# Patient Record
Sex: MALE | Race: WHITE | NOT HISPANIC OR LATINO | Employment: OTHER | URBAN - NONMETROPOLITAN AREA
[De-identification: names, ages, dates, MRNs, and addresses within clinical notes are randomized per-mention and may not be internally consistent; named-entity substitution may affect disease eponyms.]

---

## 2019-10-30 ENCOUNTER — LAB (OUTPATIENT)
Dept: LAB | Facility: HOSPITAL | Age: 79
End: 2019-10-30

## 2019-10-30 ENCOUNTER — HOSPITAL ENCOUNTER (OUTPATIENT)
Dept: GENERAL RADIOLOGY | Facility: HOSPITAL | Age: 79
Discharge: HOME | End: 2019-10-30
Admitting: PEDIATRICS

## 2019-10-30 ENCOUNTER — TRANSCRIBE ORDERS (OUTPATIENT)
Dept: ADMINISTRATIVE | Facility: HOSPITAL | Age: 79
End: 2019-10-30

## 2019-10-30 DIAGNOSIS — Z12.5 ENCOUNTER FOR SPECIAL SCREENING EXAMINATION FOR NEOPLASM OF PROSTATE: ICD-10-CM

## 2019-10-30 DIAGNOSIS — R05.9 COUGH: ICD-10-CM

## 2019-10-30 DIAGNOSIS — E66.9 OBESITY, UNSPECIFIED CLASSIFICATION, UNSPECIFIED OBESITY TYPE, UNSPECIFIED WHETHER SERIOUS COMORBIDITY PRESENT: Primary | ICD-10-CM

## 2019-10-30 DIAGNOSIS — M54.2 CERVICALGIA: ICD-10-CM

## 2019-10-30 DIAGNOSIS — E66.9 OBESITY, UNSPECIFIED CLASSIFICATION, UNSPECIFIED OBESITY TYPE, UNSPECIFIED WHETHER SERIOUS COMORBIDITY PRESENT: ICD-10-CM

## 2019-10-30 LAB
ALBUMIN SERPL-MCNC: 3.7 G/DL (ref 3.5–5)
ALBUMIN/GLOB SERPL: 1 G/DL (ref 1.1–2.5)
ALP SERPL-CCNC: 116 U/L (ref 24–120)
ALT SERPL W P-5'-P-CCNC: 17 U/L (ref 0–54)
ANION GAP SERPL CALCULATED.3IONS-SCNC: 7 MMOL/L (ref 4–13)
AST SERPL-CCNC: 22 U/L (ref 7–45)
AUTO MIXED CELLS #: 1.1 10*3/MM3 (ref 0.1–2.6)
AUTO MIXED CELLS %: 14.8 % (ref 0.1–24)
BILIRUB SERPL-MCNC: 0.6 MG/DL (ref 0.1–1)
BILIRUB UR QL STRIP: NEGATIVE
BUN BLD-MCNC: 15 MG/DL (ref 5–21)
BUN/CREAT SERPL: 12.3
CALCIUM SPEC-SCNC: 8.5 MG/DL (ref 8.4–10.4)
CHLORIDE SERPL-SCNC: 108 MMOL/L (ref 98–110)
CLARITY UR: CLEAR
CO2 SERPL-SCNC: 25 MMOL/L (ref 24–31)
COLOR UR: YELLOW
CREAT BLD-MCNC: 1.22 MG/DL (ref 0.5–1.4)
ERYTHROCYTE [DISTWIDTH] IN BLOOD BY AUTOMATED COUNT: 19 % (ref 12.3–15.4)
GFR SERPL CREATININE-BSD FRML MDRD: 58 ML/MIN/1.73
GLOBULIN UR ELPH-MCNC: 3.8 GM/DL
GLUCOSE BLD-MCNC: 103 MG/DL (ref 70–100)
GLUCOSE UR STRIP-MCNC: NEGATIVE MG/DL
HBA1C MFR BLD: 6.4 % (ref 4.8–5.9)
HCT VFR BLD AUTO: 31.8 % (ref 37.5–51)
HGB BLD-MCNC: 8.8 G/DL (ref 13–17.7)
HGB UR QL STRIP.AUTO: NEGATIVE
KETONES UR QL STRIP: NEGATIVE
LEUKOCYTE ESTERASE UR QL STRIP.AUTO: NEGATIVE
LYMPHOCYTES # BLD AUTO: 1.4 10*3/MM3 (ref 0.7–3.1)
LYMPHOCYTES NFR BLD AUTO: 18.8 % (ref 19.6–45.3)
MCH RBC QN AUTO: 20.6 PG (ref 26.6–33)
MCHC RBC AUTO-ENTMCNC: 27.7 G/DL (ref 31.5–35.7)
MCV RBC AUTO: 74.3 FL (ref 79–97)
NEUTROPHILS # BLD AUTO: 5 10*3/MM3 (ref 1.7–7)
NEUTROPHILS NFR BLD AUTO: 66.4 % (ref 42.7–76)
NITRITE UR QL STRIP: NEGATIVE
PH UR STRIP.AUTO: 6 [PH] (ref 5–8)
PLATELET # BLD AUTO: 395 10*3/MM3 (ref 140–450)
PMV BLD AUTO: 8.8 FL (ref 6–12)
POTASSIUM BLD-SCNC: 4.7 MMOL/L (ref 3.5–5.3)
PROT SERPL-MCNC: 7.5 G/DL (ref 6.3–8.7)
PROT UR QL STRIP: NEGATIVE
RBC # BLD AUTO: 4.28 10*6/MM3 (ref 4.14–5.8)
SODIUM BLD-SCNC: 140 MMOL/L (ref 135–145)
SP GR UR STRIP: 1.01 (ref 1–1.03)
UROBILINOGEN UR QL STRIP: NORMAL
WBC NRBC COR # BLD: 7.5 10*3/MM3 (ref 3.4–10.8)

## 2019-10-30 PROCEDURE — 71046 X-RAY EXAM CHEST 2 VIEWS: CPT

## 2019-10-30 PROCEDURE — 72050 X-RAY EXAM NECK SPINE 4/5VWS: CPT

## 2019-10-30 PROCEDURE — 83036 HEMOGLOBIN GLYCOSYLATED A1C: CPT

## 2019-10-30 PROCEDURE — G0103 PSA SCREENING: HCPCS | Performed by: PEDIATRICS

## 2019-10-30 PROCEDURE — 36415 COLL VENOUS BLD VENIPUNCTURE: CPT

## 2019-10-30 PROCEDURE — 81003 URINALYSIS AUTO W/O SCOPE: CPT

## 2019-10-30 PROCEDURE — 85025 COMPLETE CBC W/AUTO DIFF WBC: CPT

## 2019-10-30 PROCEDURE — 80053 COMPREHEN METABOLIC PANEL: CPT

## 2019-10-31 LAB — PSA SERPL-MCNC: 3.94 NG/ML (ref 0–4)

## 2019-11-05 RX ORDER — MULTIVIT-MIN/IRON/FOLIC ACID/K 18-600-40
CAPSULE ORAL
Status: ON HOLD | COMMUNITY
End: 2019-11-19

## 2019-11-05 RX ORDER — ERGOCALCIFEROL (VITAMIN D2) 10 MCG
400 TABLET ORAL DAILY
Status: ON HOLD | COMMUNITY
End: 2019-11-19

## 2019-11-15 ENCOUNTER — APPOINTMENT (OUTPATIENT)
Dept: GENERAL RADIOLOGY | Facility: HOSPITAL | Age: 79
End: 2019-11-15

## 2019-11-15 ENCOUNTER — HOSPITAL ENCOUNTER (INPATIENT)
Facility: HOSPITAL | Age: 79
LOS: 7 days | Discharge: NURSING, REHAB OR LONG-TERM CARE | End: 2019-11-22
Attending: EMERGENCY MEDICINE | Admitting: INTERNAL MEDICINE

## 2019-11-15 DIAGNOSIS — J96.02 ACUTE RESPIRATORY FAILURE WITH HYPOXIA AND HYPERCAPNIA (HCC): Primary | ICD-10-CM

## 2019-11-15 DIAGNOSIS — J44.1 COPD WITH ACUTE EXACERBATION (HCC): ICD-10-CM

## 2019-11-15 DIAGNOSIS — R26.9 GAIT ABNORMALITY: ICD-10-CM

## 2019-11-15 DIAGNOSIS — J96.01 ACUTE RESPIRATORY FAILURE WITH HYPOXIA AND HYPERCAPNIA (HCC): Primary | ICD-10-CM

## 2019-11-15 PROBLEM — D64.9 ANEMIA: Status: ACTIVE | Noted: 2019-11-15

## 2019-11-15 PROBLEM — I10 HYPERTENSION: Status: ACTIVE | Noted: 2019-11-15

## 2019-11-15 LAB
ALBUMIN SERPL-MCNC: 3.7 G/DL (ref 3.5–5.2)
ALBUMIN/GLOB SERPL: 0.9 G/DL
ALP SERPL-CCNC: 145 U/L (ref 39–117)
ALT SERPL W P-5'-P-CCNC: 17 U/L (ref 1–41)
ANION GAP SERPL CALCULATED.3IONS-SCNC: 8 MMOL/L (ref 5–15)
ANISOCYTOSIS BLD QL: ABNORMAL
ARTERIAL PATENCY WRIST A: ABNORMAL
ARTERIAL PATENCY WRIST A: POSITIVE
AST SERPL-CCNC: 27 U/L (ref 1–40)
ATMOSPHERIC PRESS: 759 MMHG
BASE EXCESS BLDA CALC-SCNC: 4.7 MMOL/L (ref 0–2)
BASE EXCESS BLDA CALC-SCNC: 5.7 MMOL/L (ref 0–2)
BASE EXCESS BLDA CALC-SCNC: 5.9 MMOL/L (ref 0–2)
BASE EXCESS BLDA CALC-SCNC: 6.2 MMOL/L (ref 0–2)
BASOPHILS # BLD MANUAL: 0.13 10*3/MM3 (ref 0–0.2)
BASOPHILS NFR BLD AUTO: 2 % (ref 0–1.5)
BDY SITE: ABNORMAL
BILIRUB SERPL-MCNC: 0.6 MG/DL (ref 0.2–1.2)
BODY TEMPERATURE: 37 C
BUN BLD-MCNC: 14 MG/DL (ref 8–23)
BUN/CREAT SERPL: 13.2 (ref 7–25)
CALCIUM SPEC-SCNC: 9 MG/DL (ref 8.6–10.5)
CHLORIDE SERPL-SCNC: 103 MMOL/L (ref 98–107)
CO2 SERPL-SCNC: 33 MMOL/L (ref 22–29)
CREAT BLD-MCNC: 1.06 MG/DL (ref 0.76–1.27)
D DIMER PPP FEU-MCNC: 1.81 MG/L (FEU) (ref 0–0.5)
D-LACTATE SERPL-SCNC: 1.1 MMOL/L (ref 0.5–2)
DEPRECATED RDW RBC AUTO: 54.5 FL (ref 37–54)
EOSINOPHIL # BLD MANUAL: 0.79 10*3/MM3 (ref 0–0.4)
EOSINOPHIL NFR BLD MANUAL: 12.1 % (ref 0.3–6.2)
EPAP: 6
ERYTHROCYTE [DISTWIDTH] IN BLOOD BY AUTOMATED COUNT: 20.5 % (ref 12.3–15.4)
GAS FLOW AIRWAY: 40 LPM
GFR SERPL CREATININE-BSD FRML MDRD: 68 ML/MIN/1.73
GIANT PLATELETS: ABNORMAL
GLOBULIN UR ELPH-MCNC: 3.9 GM/DL
GLUCOSE BLD-MCNC: 128 MG/DL (ref 65–99)
HCO3 BLDA-SCNC: 32.1 MMOL/L (ref 20–26)
HCO3 BLDA-SCNC: 32.2 MMOL/L (ref 20–26)
HCO3 BLDA-SCNC: 33.2 MMOL/L (ref 20–26)
HCO3 BLDA-SCNC: 33.3 MMOL/L (ref 20–26)
HCT VFR BLD AUTO: 32.4 % (ref 37.5–51)
HGB BLD-MCNC: 8.5 G/DL (ref 13–17.7)
HOLD SPECIMEN: NORMAL
HOROWITZ INDEX BLD+IHG-RTO: 30 %
HOROWITZ INDEX BLD+IHG-RTO: 35 %
HOROWITZ INDEX BLD+IHG-RTO: 60 %
HYPOCHROMIA BLD QL: ABNORMAL
IPAP: 20
LYMPHOCYTES # BLD MANUAL: 0.53 10*3/MM3 (ref 0.7–3.1)
LYMPHOCYTES NFR BLD MANUAL: 4 % (ref 5–12)
LYMPHOCYTES NFR BLD MANUAL: 8.1 % (ref 19.6–45.3)
Lab: ABNORMAL
MAGNESIUM SERPL-MCNC: 2 MG/DL (ref 1.6–2.4)
MCH RBC QN AUTO: 19.6 PG (ref 26.6–33)
MCHC RBC AUTO-ENTMCNC: 26.2 G/DL (ref 31.5–35.7)
MCV RBC AUTO: 74.7 FL (ref 79–97)
MICROCYTES BLD QL: ABNORMAL
MODALITY: ABNORMAL
MONOCYTES # BLD AUTO: 0.26 10*3/MM3 (ref 0.1–0.9)
NEUTROPHILS # BLD AUTO: 4.78 10*3/MM3 (ref 1.7–7)
NEUTROPHILS NFR BLD MANUAL: 72.7 % (ref 42.7–76)
NOTIFIED BY: ABNORMAL
NOTIFIED WHO: ABNORMAL
NRBC SPEC MANUAL: 4 /100 WBC (ref 0–0.2)
NT-PROBNP SERPL-MCNC: 1550 PG/ML (ref 5–1800)
PCO2 BLDA: 57.5 MM HG (ref 35–45)
PCO2 BLDA: 64.5 MM HG (ref 35–45)
PCO2 BLDA: 66.1 MM HG (ref 35–45)
PCO2 BLDA: 66.8 MM HG (ref 35–45)
PEEP RESPIRATORY: 5 CM[H2O]
PH BLDA: 7.29 PH UNITS (ref 7.35–7.45)
PH BLDA: 7.31 PH UNITS (ref 7.35–7.45)
PH BLDA: 7.32 PH UNITS (ref 7.35–7.45)
PH BLDA: 7.36 PH UNITS (ref 7.35–7.45)
PLATELET # BLD AUTO: 372 10*3/MM3 (ref 140–450)
PMV BLD AUTO: 9.5 FL (ref 6–12)
PO2 BLDA: 121 MM HG (ref 83–108)
PO2 BLDA: 45.9 MM HG (ref 83–108)
PO2 BLDA: 74 MM HG (ref 83–108)
PO2 BLDA: 76.8 MM HG (ref 83–108)
POIKILOCYTOSIS BLD QL SMEAR: ABNORMAL
POLYCHROMASIA BLD QL SMEAR: ABNORMAL
POTASSIUM BLD-SCNC: 3.8 MMOL/L (ref 3.5–5.2)
PROT SERPL-MCNC: 7.6 G/DL (ref 6–8.5)
RBC # BLD AUTO: 4.34 10*6/MM3 (ref 4.14–5.8)
SAO2 % BLDCOA: 80 % (ref 94–99)
SAO2 % BLDCOA: 93.9 % (ref 94–99)
SAO2 % BLDCOA: 94.4 % (ref 94–99)
SAO2 % BLDCOA: 98.5 % (ref 94–99)
SET MECH RESP RATE: 14
SET MECH RESP RATE: 16
SODIUM BLD-SCNC: 144 MMOL/L (ref 136–145)
TROPONIN T SERPL-MCNC: 0.01 NG/ML (ref 0–0.03)
VARIANT LYMPHS NFR BLD MANUAL: 1 % (ref 0–5)
VENTILATOR MODE: ABNORMAL
VENTILATOR MODE: AC
VT ON VENT VENT: 600 ML
WBC MORPH BLD: NORMAL
WBC NRBC COR # BLD: 6.57 10*3/MM3 (ref 3.4–10.8)

## 2019-11-15 PROCEDURE — 83735 ASSAY OF MAGNESIUM: CPT | Performed by: EMERGENCY MEDICINE

## 2019-11-15 PROCEDURE — 80053 COMPREHEN METABOLIC PANEL: CPT | Performed by: EMERGENCY MEDICINE

## 2019-11-15 PROCEDURE — 93005 ELECTROCARDIOGRAM TRACING: CPT | Performed by: EMERGENCY MEDICINE

## 2019-11-15 PROCEDURE — 94799 UNLISTED PULMONARY SVC/PX: CPT

## 2019-11-15 PROCEDURE — 87040 BLOOD CULTURE FOR BACTERIA: CPT | Performed by: EMERGENCY MEDICINE

## 2019-11-15 PROCEDURE — 85007 BL SMEAR W/DIFF WBC COUNT: CPT | Performed by: EMERGENCY MEDICINE

## 2019-11-15 PROCEDURE — 25010000002 METHYLPREDNISOLONE PER 125 MG: Performed by: EMERGENCY MEDICINE

## 2019-11-15 PROCEDURE — 5A1945Z RESPIRATORY VENTILATION, 24-96 CONSECUTIVE HOURS: ICD-10-PCS | Performed by: INTERNAL MEDICINE

## 2019-11-15 PROCEDURE — 93010 ELECTROCARDIOGRAM REPORT: CPT | Performed by: INTERNAL MEDICINE

## 2019-11-15 PROCEDURE — 25010000002 METHYLPREDNISOLONE PER 125 MG: Performed by: NURSE PRACTITIONER

## 2019-11-15 PROCEDURE — 83880 ASSAY OF NATRIURETIC PEPTIDE: CPT | Performed by: EMERGENCY MEDICINE

## 2019-11-15 PROCEDURE — 31500 INSERT EMERGENCY AIRWAY: CPT

## 2019-11-15 PROCEDURE — 0BH17EZ INSERTION OF ENDOTRACHEAL AIRWAY INTO TRACHEA, VIA NATURAL OR ARTIFICIAL OPENING: ICD-10-PCS | Performed by: FAMILY MEDICINE

## 2019-11-15 PROCEDURE — 71045 X-RAY EXAM CHEST 1 VIEW: CPT

## 2019-11-15 PROCEDURE — 25010000002 ENOXAPARIN PER 10 MG: Performed by: NURSE PRACTITIONER

## 2019-11-15 PROCEDURE — 94002 VENT MGMT INPAT INIT DAY: CPT

## 2019-11-15 PROCEDURE — 82803 BLOOD GASES ANY COMBINATION: CPT

## 2019-11-15 PROCEDURE — 85379 FIBRIN DEGRADATION QUANT: CPT | Performed by: EMERGENCY MEDICINE

## 2019-11-15 PROCEDURE — 94660 CPAP INITIATION&MGMT: CPT

## 2019-11-15 PROCEDURE — 94770: CPT

## 2019-11-15 PROCEDURE — 36600 WITHDRAWAL OF ARTERIAL BLOOD: CPT

## 2019-11-15 PROCEDURE — 94760 N-INVAS EAR/PLS OXIMETRY 1: CPT

## 2019-11-15 PROCEDURE — 93005 ELECTROCARDIOGRAM TRACING: CPT

## 2019-11-15 PROCEDURE — 85025 COMPLETE CBC W/AUTO DIFF WBC: CPT | Performed by: EMERGENCY MEDICINE

## 2019-11-15 PROCEDURE — 25010000002 CEFTRIAXONE PER 250 MG: Performed by: NURSE PRACTITIONER

## 2019-11-15 PROCEDURE — 25010000002 PROPOFOL 1000 MG/ML EMULSION

## 2019-11-15 PROCEDURE — 94640 AIRWAY INHALATION TREATMENT: CPT

## 2019-11-15 PROCEDURE — 83605 ASSAY OF LACTIC ACID: CPT | Performed by: EMERGENCY MEDICINE

## 2019-11-15 PROCEDURE — 99285 EMERGENCY DEPT VISIT HI MDM: CPT

## 2019-11-15 PROCEDURE — 84484 ASSAY OF TROPONIN QUANT: CPT | Performed by: EMERGENCY MEDICINE

## 2019-11-15 PROCEDURE — 25010000002 SUCCINYLCHOLINE PER 20 MG: Performed by: EMERGENCY MEDICINE

## 2019-11-15 PROCEDURE — 51702 INSERT TEMP BLADDER CATH: CPT

## 2019-11-15 RX ORDER — ETOMIDATE 2 MG/ML
10 INJECTION INTRAVENOUS ONCE
Status: COMPLETED | OUTPATIENT
Start: 2019-11-15 | End: 2019-11-15

## 2019-11-15 RX ORDER — METHYLPREDNISOLONE SODIUM SUCCINATE 125 MG/2ML
60 INJECTION, POWDER, LYOPHILIZED, FOR SOLUTION INTRAMUSCULAR; INTRAVENOUS EVERY 6 HOURS
Status: DISCONTINUED | OUTPATIENT
Start: 2019-11-15 | End: 2019-11-17

## 2019-11-15 RX ORDER — SODIUM CHLORIDE 0.9 % (FLUSH) 0.9 %
10 SYRINGE (ML) INJECTION EVERY 12 HOURS SCHEDULED
Status: DISCONTINUED | OUTPATIENT
Start: 2019-11-15 | End: 2019-11-22 | Stop reason: HOSPADM

## 2019-11-15 RX ORDER — SODIUM CHLORIDE 0.9 % (FLUSH) 0.9 %
10 SYRINGE (ML) INJECTION AS NEEDED
Status: DISCONTINUED | OUTPATIENT
Start: 2019-11-15 | End: 2019-11-22 | Stop reason: HOSPADM

## 2019-11-15 RX ORDER — LORAZEPAM 2 MG/ML
1 INJECTION INTRAMUSCULAR ONCE
Status: DISCONTINUED | OUTPATIENT
Start: 2019-11-15 | End: 2019-11-18

## 2019-11-15 RX ORDER — ONDANSETRON 2 MG/ML
4 INJECTION INTRAMUSCULAR; INTRAVENOUS EVERY 6 HOURS PRN
Status: DISCONTINUED | OUTPATIENT
Start: 2019-11-15 | End: 2019-11-22 | Stop reason: HOSPADM

## 2019-11-15 RX ORDER — IPRATROPIUM BROMIDE AND ALBUTEROL SULFATE 2.5; .5 MG/3ML; MG/3ML
3 SOLUTION RESPIRATORY (INHALATION)
Status: DISCONTINUED | OUTPATIENT
Start: 2019-11-15 | End: 2019-11-22 | Stop reason: HOSPADM

## 2019-11-15 RX ORDER — METHYLPREDNISOLONE SODIUM SUCCINATE 125 MG/2ML
125 INJECTION, POWDER, LYOPHILIZED, FOR SOLUTION INTRAMUSCULAR; INTRAVENOUS ONCE
Status: COMPLETED | OUTPATIENT
Start: 2019-11-15 | End: 2019-11-15

## 2019-11-15 RX ORDER — ROCURONIUM BROMIDE 10 MG/ML
50 INJECTION, SOLUTION INTRAVENOUS ONCE
Status: COMPLETED | OUTPATIENT
Start: 2019-11-15 | End: 2019-11-15

## 2019-11-15 RX ORDER — IPRATROPIUM BROMIDE AND ALBUTEROL SULFATE 2.5; .5 MG/3ML; MG/3ML
3 SOLUTION RESPIRATORY (INHALATION) ONCE
Status: COMPLETED | OUTPATIENT
Start: 2019-11-15 | End: 2019-11-15

## 2019-11-15 RX ORDER — ONDANSETRON 4 MG/1
4 TABLET, FILM COATED ORAL EVERY 6 HOURS PRN
Status: DISCONTINUED | OUTPATIENT
Start: 2019-11-15 | End: 2019-11-22 | Stop reason: HOSPADM

## 2019-11-15 RX ORDER — SUCCINYLCHOLINE CHLORIDE 20 MG/ML
100 INJECTION INTRAMUSCULAR; INTRAVENOUS ONCE
Status: COMPLETED | OUTPATIENT
Start: 2019-11-15 | End: 2019-11-15

## 2019-11-15 RX ADMIN — SUCCINYLCHOLINE CHLORIDE 100 MG: 20 INJECTION, SOLUTION INTRAMUSCULAR; INTRAVENOUS at 18:02

## 2019-11-15 RX ADMIN — ROCURONIUM BROMIDE 50 MG: 10 SOLUTION INTRAVENOUS at 18:26

## 2019-11-15 RX ADMIN — SODIUM CHLORIDE, PRESERVATIVE FREE 10 ML: 5 INJECTION INTRAVENOUS at 23:22

## 2019-11-15 RX ADMIN — ETOMIDATE 10 MG: 2 INJECTION INTRAVENOUS at 18:01

## 2019-11-15 RX ADMIN — IPRATROPIUM BROMIDE AND ALBUTEROL SULFATE 3 ML: 2.5; .5 SOLUTION RESPIRATORY (INHALATION) at 15:48

## 2019-11-15 RX ADMIN — METHYLPREDNISOLONE SODIUM SUCCINATE 60 MG: 125 INJECTION, POWDER, FOR SOLUTION INTRAMUSCULAR; INTRAVENOUS at 23:21

## 2019-11-15 RX ADMIN — CEFTRIAXONE SODIUM 1 G: 1 INJECTION, POWDER, FOR SOLUTION INTRAMUSCULAR; INTRAVENOUS at 18:16

## 2019-11-15 RX ADMIN — ENOXAPARIN SODIUM 40 MG: 40 INJECTION SUBCUTANEOUS at 18:34

## 2019-11-15 RX ADMIN — METHYLPREDNISOLONE SODIUM SUCCINATE 125 MG: 125 INJECTION, POWDER, FOR SOLUTION INTRAMUSCULAR; INTRAVENOUS at 15:40

## 2019-11-15 RX ADMIN — PROPOFOL 5 MCG/KG/MIN: 10 INJECTION, EMULSION INTRAVENOUS at 18:05

## 2019-11-15 RX ADMIN — IPRATROPIUM BROMIDE AND ALBUTEROL SULFATE 3 ML: 2.5; .5 SOLUTION RESPIRATORY (INHALATION) at 20:46

## 2019-11-15 NOTE — H&P
"    [facility] Medicine Services  HISTORY AND PHYSICAL    Date of Admission: 11/15/2019  Primary Care Physician: Delvin Caldera MD    Subjective     Chief Complaint: Shortness of breathing with declining condition    History of Present Illness  HPI obtained from medical record/emergency department note and discussion with Dr. Barr as patient currently is obtunded.  Apparently patient was seen at \"clinic\" 2 days ago after a fall, x-rays were completed and he was stated there was no fractures.  He complained of jock itch and they did provide medication.  He has subsequently developed scrotal and abdominal swelling that he feels is secondary to medication used to treat issue.  He returned to clinic today and was informed he needed to come to the emergency department to \"get fluid drained off\".  It is surmised they felt he was having a heart failure exacerbation however laboratory studies and x-rays do not indicate this.  Patient stated his legs are no larger from edema than normal.  Patient was placed on Vapotherm, repeat ABGs worse than initial, subsequently changed to BiPAP and again they continue to decline.  There is discussion regarding possible need to intubate however we will continue with conservative measures for now.  Patient will nod or shake head to questions however I am unable to determine answers from that behavior.  Lung sounds are extremely coarse throughout patient is noted to have anemia with hemoglobin of 8.5, elevated d-dimer 1.81 will determine if we will proceed with CTA when respiratory status more stable.  Currently patient is unable to answer any questions.  He is going to be housed in the emergency department for now until critical bed opens.  Condition is critical.    Review of Systems   Unable to determine due to patient's altered mental status    Past Medical History:   Past Medical History:   Diagnosis Date   • Cataract    • Hypertension        Past " "Surgical History:   Past Surgical History:   Procedure Laterality Date   • COLONOSCOPY     • HEMORRHOIDECTOMY         Family History: family history is not on file.  Unable to determine due to patient's altered mental status    Social History:  reports that he has been smoking.  He has a 82.50 pack-year smoking history. He has never used smokeless tobacco. He reports that he drinks about 8.4 oz of alcohol per week. He reports that he does not use drugs.    Code Status: Full      Allergies:  Allergies   Allergen Reactions   • Tetracycline Other (See Comments)       Medications:  Prior to Admission medications    Medication Sig Start Date End Date Taking? Authorizing Provider   Ascorbic Acid (VITAMIN C) 500 MG capsule     Amira Huizar MD   ipratropium-albuterol (COMBIVENT RESPIMAT)  MCG/ACT inhaler Inhale 1 puff 4 times a day by inhalation route. 10/30/19   Amira Huizar MD   vitamin A 54643 UNIT capsule Take 10,000 Units by mouth Daily.    Amira Huizar MD   Vitamin D, Cholecalciferol, (CHOLECALCIFEROL) 10 MCG (400 UNIT) tablet Take 400 Units by mouth Daily.    Amira Huizar MD       Objective     /78   Pulse 77   Temp 98.1 °F (36.7 °C) (Oral)   Resp 18   Ht 165.1 cm (65\")   Wt 88.9 kg (196 lb)   SpO2 96%   BMI 32.62 kg/m²   Physical Exam   Constitutional: He appears well-developed and well-nourished.   HENT:   Head: Normocephalic and atraumatic.   Eyes: EOM are normal. Pupils are equal, round, and reactive to light.   Neck: Neck supple. No tracheal deviation present.   Cardiovascular: Normal rate, regular rhythm and normal heart sounds.   No murmur heard.  Pulmonary/Chest: He is in respiratory distress.   Coarseness throughout   Abdominal: Soft. Bowel sounds are normal. He exhibits distension.   Musculoskeletal: He exhibits edema (trace bilateral lower extremities).   Neurological:   Obtunded   Skin: Skin is warm and dry. There is pallor.   Psychiatric:   Flat "       Pertinent Data:   Lab Results (last 72 hours)     Procedure Component Value Units Date/Time    Blood Gas, Arterial [[id number]]  (Abnormal) Collected:  11/15/19 1630    Specimen:  Arterial Blood Updated:  11/15/19 1640     Site Right Radial     Dov's Test Positive     pH, Arterial 7.309 pH units      Comment: 84 Value below reference range        pCO2, Arterial 66.1 mm Hg      Comment: 83 Value above reference range        pO2, Arterial 76.8 mm Hg      Comment: 84 Value below reference range        HCO3, Arterial 33.2 mmol/L      Comment: 83 Value above reference range        Base Excess, Arterial 5.9 mmol/L      Comment: 83 Value above reference range        O2 Saturation, Arterial 94.4 %      Temperature 37.0 C      Barometric Pressure for Blood Gas 759 mmHg      Modality BiPap     FIO2 30 %      Ventilator Mode NA     Set Mech Resp Rate 16.0     IPAP 20     Comment: Meter: [id number]     :  [id number]        EPAP 6     Collected by [id number]    Blood Gas, Arterial [[id number]]  (Abnormal) Collected:  11/15/19 1514    Specimen:  Arterial Blood Updated:  11/15/19 1518     Site Left Radial     Dov's Test Positive     pH, Arterial 7.321 pH units      Comment: 84 Value below reference range        pCO2, Arterial 64.5 mm Hg      Comment: 83 Value above reference range        pO2, Arterial 74.0 mm Hg      Comment: 84 Value below reference range        HCO3, Arterial 33.3 mmol/L      Comment: 83 Value above reference range        Base Excess, Arterial 6.2 mmol/L      Comment: 83 Value above reference range        O2 Saturation, Arterial 93.9 %      Comment: 84 Value below reference range        Temperature 37.0 C      Barometric Pressure for Blood Gas 759 mmHg      Modality Heated HFNC     FIO2 35 %      Flow Rate 40.0 lpm      Ventilator Mode NA     Collected by [id number]     Comment: Meter: [id number]     :  [id number]       D-dimer, Quantitative [[id number]]  (Abnormal) Collected:  11/15/19  1355    Specimen:  Blood Updated:  11/15/19 1501     D-Dimer, Quantitative 1.81 mg/L (FEU)     Narrative:       Reference Range is 0-0.50 mg/L FEU. However, results <0.50 mg/L FEU tends to rule out DVT or PE. Results >0.50 mg/L FEU are not useful in predicting absence or presence of DVT or PE.    Manual Differential [[id number]]  (Abnormal) Collected:  11/15/19 1355    Specimen:  Blood Updated:  11/15/19 1443     Neutrophil % 72.7 %      Lymphocyte % 8.1 %      Monocyte % 4.0 %      Eosinophil % 12.1 %      Basophil % 2.0 %      Atypical Lymphocyte % 1.0 %      Neutrophils Absolute 4.78 10*3/mm3      Lymphocytes Absolute 0.53 10*3/mm3      Monocytes Absolute 0.26 10*3/mm3      Eosinophils Absolute 0.79 10*3/mm3      Basophils Absolute 0.13 10*3/mm3      nRBC 4.0 /100 WBC      Anisocytosis Slight/1+     Hypochromia Mod/2+     Microcytes Slight/1+     Poikilocytes Slight/1+     Polychromasia Slight/1+     WBC Morphology Normal     Giant Platelets Large/3+    CBC Auto Differential [[id number]]  (Abnormal) Collected:  11/15/19 1355    Specimen:  Blood Updated:  11/15/19 1443     WBC 6.57 10*3/mm3      RBC 4.34 10*6/mm3      Hemoglobin 8.5 g/dL      Hematocrit 32.4 %      MCV 74.7 fL      MCH 19.6 pg      MCHC 26.2 g/dL      RDW 20.5 %      RDW-SD 54.5 fl      MPV 9.5 fL      Platelets 372 10*3/mm3     Comprehensive Metabolic Panel [[id number]]  (Abnormal) Collected:  11/15/19 1355    Specimen:  Blood Updated:  11/15/19 1439     Glucose 128 mg/dL      BUN 14 mg/dL      Creatinine 1.06 mg/dL      Sodium 144 mmol/L      Potassium 3.8 mmol/L      Chloride 103 mmol/L      CO2 33.0 mmol/L      Calcium 9.0 mg/dL      Total Protein 7.6 g/dL      Albumin 3.70 g/dL      ALT (SGPT) 17 U/L      AST (SGOT) 27 U/L      Alkaline Phosphatase 145 U/L      Total Bilirubin 0.6 mg/dL      eGFR Non African Amer 68 mL/min/1.73      Globulin 3.9 gm/dL      A/G Ratio 0.9 g/dL      BUN/Creatinine Ratio 13.2     Anion Gap 8.0 mmol/L     Narrative:        GFR Normal >60  Chronic Kidney Disease <60  Kidney Failure <15    BNP [[id number]]  (Normal) Collected:  11/15/19 1355    Specimen:  Blood Updated:  11/15/19 1435     proBNP 1,550.0 pg/mL     Troponin [[id number]]  (Normal) Collected:  11/15/19 1355    Specimen:  Blood Updated:  11/15/19 1435     Troponin T 0.012 ng/mL     Magnesium [[id number]]  (Normal) Collected:  11/15/19 1355    Specimen:  Blood Updated:  11/15/19 1435     Magnesium 2.0 mg/dL     Lactic Acid, Plasma [[id number]]  (Normal) Collected:  11/15/19 1355    Specimen:  Blood Updated:  11/15/19 1431     Lactate 1.1 mmol/L     Blood Culture - Blood, Arm, Left [[id number]] Collected:  11/15/19 1355    Specimen:  Blood from Arm, Left Updated:  11/15/19 1424    Blood Culture - Blood, Hand, Right [[id number]] Collected:  11/15/19 1359    Specimen:  Blood from Hand, Right Updated:  11/15/19 1424    Blood Gas, Arterial [[id number]]  (Abnormal) Collected:  11/15/19 1356    Specimen:  Arterial Blood Updated:  11/15/19 1401     Site Left Radial     Dov's Test Positive     pH, Arterial 7.355 pH units      pCO2, Arterial 57.5 mm Hg      Comment: 83 Value above reference range        pO2, Arterial 45.9 mm Hg      Comment: 85 Value below critical limit        HCO3, Arterial 32.1 mmol/L      Comment: 83 Value above reference range        Base Excess, Arterial 5.7 mmol/L      Comment: 83 Value above reference range        O2 Saturation, Arterial 80.0 %      Comment: 84 Value below reference range        Temperature 37.0 C      Barometric Pressure for Blood Gas 759 mmHg      Modality Room Air     Ventilator Mode NA     Notified Who DR YOU     Notified By [id number]     Notified Time 11/15/2019 14:01     Collected by [id number]     Comment: Meter: [id number]     :  [id number]           Imaging Results (Last 24 Hours)     Procedure Component Value Units Date/Time    XR Chest 1 View [[id number]] Collected:  11/15/19 1413     Updated:  11/15/19 1417    Narrative:        Frontal upright radiograph of the chest 11/15/2019 2:09 PM CST     COMPARISON: 10/30/2019.     HISTORY: Short of breath.     FINDINGS:   Hyperinflation flattening diaphragms noted consistent with COPD.. The  cardiac silhouettes mildly enlarged but unchanged..      The osseous structures and surrounding soft tissues demonstrate no acute  abnormality.       Impression:       1. COPD no acute cardiopulmonary process.        This report was finalized on 11/15/2019 14:14 by Dr. Kailash Crouch MD.            I have personally reviewed and interpreted the radiology studies and ECG obtained at time of admission.     Assessment / Plan     Assessment:   Active Hospital Problems    Diagnosis   • **Acute respiratory failure with hypoxia and hypercapnia (CMS/HCC)   • Hypertension   • Anemia       Plan:   1.  Admit as inpatient critical care-we will have to house overnight in the emergency department due to bed availability  2.  Per discussion Dr. Lamb, will proceed with intubation, consult pulmonary  3.  Duo merlyn, RT to initiate breathing treatment protocol,  4.  N.p.o. for now  5.  Labs in a.m.  6.  DVT prophylaxis with Lovenox    I discussed the patient's findings and my recommendations with: Maximino Lamb DO  Time spent: 45 minutes      Charmaine Yao, APRN  11/15/19   5:22 PM

## 2019-11-15 NOTE — ED PROVIDER NOTES
"Subjective   Patient c/o increasing SOB for past 2 days with feeling of swelling in abdomen and was told at clinic to \"come get fluid drained off\".  Was seen there 2 days ago after a fall where he had x-rays from the fall and they were okay but also given medicine for jock itch.  He thinks he is having a reaction to the jock itch medicine.  He says he looked on the Internet and it told him that swelling could be a result of that.  He was seen again at the clinic today because of increasing shortness of breath and swelling and they told him he did not think it was the medication but sent him here to get the fluid drained off.  He has never had anything like this before.  He does smoke but has no other significant health problems.        History provided by:  Patient   used: No    Shortness of Breath   Severity:  Moderate  Onset quality:  Gradual  Duration:  2 days  Timing:  Constant  Progression:  Worsening  Chronicity:  New  Context: not activity, not animal exposure, not emotional upset, not fumes, not known allergens, not occupational exposure, not pollens, not smoke exposure, not strong odors, not URI and not weather changes    Relieved by:  Nothing  Worsened by:  Nothing  Ineffective treatments:  None tried  Associated symptoms: cough    Associated symptoms: no abdominal pain, no chest pain, no claudication, no diaphoresis, no ear pain, no fever, no headaches, no hemoptysis, no neck pain, no PND, no rash, no sore throat, no sputum production, no syncope, no swollen glands, no vomiting and no wheezing    Risk factors: no recent alcohol use, no family hx of DVT, no hx of cancer, no hx of PE/DVT, no obesity, no oral contraceptive use, no prolonged immobilization, no recent surgery and no tobacco use        Review of Systems   Constitutional: Negative.  Negative for diaphoresis and fever.   HENT: Negative.  Negative for ear pain and sore throat.    Respiratory: Positive for cough and shortness of " breath. Negative for hemoptysis, sputum production and wheezing.    Cardiovascular: Negative.  Negative for chest pain, claudication, syncope and PND.   Gastrointestinal: Negative.  Negative for abdominal pain and vomiting.   Genitourinary: Negative.    Musculoskeletal: Negative.  Negative for neck pain.   Skin: Negative for rash.   Neurological: Negative.  Negative for headaches.   Psychiatric/Behavioral: Negative.    All other systems reviewed and are negative.      Past Medical History:   Diagnosis Date   • Cataract    • Hypertension        Allergies   Allergen Reactions   • Tetracycline Other (See Comments)       Past Surgical History:   Procedure Laterality Date   • COLONOSCOPY     • HEMORRHOIDECTOMY         History reviewed. No pertinent family history.    Social History     Socioeconomic History   • Marital status: Single     Spouse name: Not on file   • Number of children: Not on file   • Years of education: Not on file   • Highest education level: Not on file   Tobacco Use   • Smoking status: Current Every Day Smoker     Packs/day: 1.50     Years: 55.00     Pack years: 82.50   • Smokeless tobacco: Never Used   Substance and Sexual Activity   • Alcohol use: Yes     Alcohol/week: 8.4 oz     Types: 14 Cans of beer per week     Frequency: Never   • Drug use: No   • Sexual activity: No       Prior to Admission medications    Medication Sig Start Date End Date Taking? Authorizing Provider   Ascorbic Acid (VITAMIN C) 500 MG capsule     Amira Huizar MD   ipratropium-albuterol (COMBIVENT RESPIMAT)  MCG/ACT inhaler Inhale 1 puff 4 times a day by inhalation route. 10/30/19   Amira Huizar MD   vitamin A 65148 UNIT capsule Take 10,000 Units by mouth Daily.    Amira Huizar MD   Vitamin D, Cholecalciferol, (CHOLECALCIFEROL) 10 MCG (400 UNIT) tablet Take 400 Units by mouth Daily.    Amira Huizar MD       Medications   sodium chloride 0.9 % flush 10 mL (not administered)   sodium  chloride 0.9 % flush 10 mL (not administered)   sodium chloride 0.9 % flush 10 mL (not administered)   enoxaparin (LOVENOX) syringe 40 mg (not administered)   cefTRIAXone (ROCEPHIN) 1 g/100 mL 0.9% NS (MBP) (not administered)   methylPREDNISolone sodium succinate (SOLU-Medrol) injection 60 mg (not administered)   ipratropium-albuterol (DUO-NEB) nebulizer solution 3 mL (not administered)   ondansetron (ZOFRAN) tablet 4 mg (not administered)     Or   ondansetron (ZOFRAN) injection 4 mg (not administered)   propofol (DIPRIVAN) infusion 10 mg/mL 100 mL (5 mcg/kg/min × 88.9 kg Intravenous New Bag 11/15/19 1805)   methylPREDNISolone sodium succinate (SOLU-Medrol) injection 125 mg (125 mg Intravenous Given 11/15/19 1540)   ipratropium-albuterol (DUO-NEB) nebulizer solution 3 mL (3 mL Nebulization Given 11/15/19 1548)   etomidate (AMIDATE) injection 10 mg (10 mg Intravenous Given 11/15/19 1801)   succinylcholine (ANECTINE) injection 100 mg (100 mg Intravenous Given 11/15/19 1802)       Vitals:    11/15/19 1745   BP: 151/75   Pulse:    Resp:    Temp:    SpO2:          Objective   Physical Exam   Constitutional: He is oriented to person, place, and time. He appears well-developed and well-nourished.   HENT:   Head: Normocephalic and atraumatic.   Neck: Normal range of motion. Neck supple.   Cardiovascular: Normal rate and regular rhythm.   Pulmonary/Chest: Tachypnea noted. He has rales in the right middle field and the left middle field.   Abdominal: Soft. Bowel sounds are normal.   Musculoskeletal: Normal range of motion.        Right lower leg: He exhibits edema.        Left lower leg: He exhibits edema.   Neurological: He is alert and oriented to person, place, and time.   Skin: Skin is warm and dry. Capillary refill takes less than 2 seconds.   Psychiatric: He has a normal mood and affect. His behavior is normal.   Nursing note and vitals reviewed.      Critical Care  Performed by: Ike Barr Jr., MD  Authorized  by: Ike Barr Jr., MD     Critical care provider statement:     Critical care time (minutes):  30    Critical care start time:  11/15/2019 5:00 PM    Critical care end time:  11/15/2019 5:30 PM    Critical care time was exclusive of:  Separately billable procedures and treating other patients    Critical care was necessary to treat or prevent imminent or life-threatening deterioration of the following conditions:  Respiratory failure    Critical care was time spent personally by me on the following activities:  Blood draw for specimens, development of treatment plan with patient or surrogate, discussions with primary provider, evaluation of patient's response to treatment, examination of patient, interpretation of cardiac output measurements, obtaining history from patient or surrogate, ordering and performing treatments and interventions, ordering and review of laboratory studies, ordering and review of radiographic studies, pulse oximetry and re-evaluation of patient's condition    I assumed direction of critical care for this patient from another provider in my specialty: no               Lab Results (last 24 hours)     Procedure Component Value Units Date/Time    CBC & Differential [[id number]] Collected:  11/15/19 1355    Specimen:  Blood Updated:  11/15/19 1443    Narrative:       The following orders were created for panel order CBC & Differential.  Procedure                               Abnormality         Status                     ---------                               -----------         ------                     CBC Auto Differential[[id number]]        Abnormal            Final result                 Please view results for these tests on the individual orders.    Comprehensive Metabolic Panel [[id number]]  (Abnormal) Collected:  11/15/19 1355    Specimen:  Blood Updated:  11/15/19 1439     Glucose 128 mg/dL      BUN 14 mg/dL      Creatinine 1.06 mg/dL      Sodium 144 mmol/L      Potassium 3.8  mmol/L      Chloride 103 mmol/L      CO2 33.0 mmol/L      Calcium 9.0 mg/dL      Total Protein 7.6 g/dL      Albumin 3.70 g/dL      ALT (SGPT) 17 U/L      AST (SGOT) 27 U/L      Alkaline Phosphatase 145 U/L      Total Bilirubin 0.6 mg/dL      eGFR Non African Amer 68 mL/min/1.73      Globulin 3.9 gm/dL      A/G Ratio 0.9 g/dL      BUN/Creatinine Ratio 13.2     Anion Gap 8.0 mmol/L     Narrative:       GFR Normal >60  Chronic Kidney Disease <60  Kidney Failure <15    BNP [[id number]]  (Normal) Collected:  11/15/19 1355    Specimen:  Blood Updated:  11/15/19 1435     proBNP 1,550.0 pg/mL     Narrative:       Among patients with dyspnea, NT-proBNP is highly sensitive for the detection of acute congestive heart failure. In addition NT-proBNP of <300 pg/ml effectively rules out acute congestive heart failure with 99% negative predictive value.    D-dimer, Quantitative [[id number]]  (Abnormal) Collected:  11/15/19 1355    Specimen:  Blood Updated:  11/15/19 1501     D-Dimer, Quantitative 1.81 mg/L (FEU)     Narrative:       Reference Range is 0-0.50 mg/L FEU. However, results <0.50 mg/L FEU tends to rule out DVT or PE. Results >0.50 mg/L FEU are not useful in predicting absence or presence of DVT or PE.    Troponin [[id number]]  (Normal) Collected:  11/15/19 1355    Specimen:  Blood Updated:  11/15/19 1435     Troponin T 0.012 ng/mL     Narrative:       Troponin T Reference Range:  <= 0.03 ng/mL-   Negative for AMI  >0.03 ng/mL-     Abnormal for myocardial necrosis.  Clinicians would have to utilize clinical acumen, EKG, Troponin and serial changes to determine if it is an Acute Myocardial Infarction or myocardial injury due to an underlying chronic condition.     Blood Culture - Blood, Arm, Left [[id number]] Collected:  11/15/19 1355    Specimen:  Blood from Arm, Left Updated:  11/15/19 1424    Lactic Acid, Plasma [[id number]]  (Normal) Collected:  11/15/19 1355    Specimen:  Blood Updated:  11/15/19 1431     Lactate 1.1  mmol/L     Magnesium [[id number]]  (Normal) Collected:  11/15/19 1355    Specimen:  Blood Updated:  11/15/19 1435     Magnesium 2.0 mg/dL     CBC Auto Differential [[id number]]  (Abnormal) Collected:  11/15/19 1355    Specimen:  Blood Updated:  11/15/19 1443     WBC 6.57 10*3/mm3      RBC 4.34 10*6/mm3      Hemoglobin 8.5 g/dL      Hematocrit 32.4 %      MCV 74.7 fL      MCH 19.6 pg      MCHC 26.2 g/dL      RDW 20.5 %      RDW-SD 54.5 fl      MPV 9.5 fL      Platelets 372 10*3/mm3     Manual Differential [[id number]]  (Abnormal) Collected:  11/15/19 1355    Specimen:  Blood Updated:  11/15/19 1443     Neutrophil % 72.7 %      Lymphocyte % 8.1 %      Monocyte % 4.0 %      Eosinophil % 12.1 %      Basophil % 2.0 %      Atypical Lymphocyte % 1.0 %      Neutrophils Absolute 4.78 10*3/mm3      Lymphocytes Absolute 0.53 10*3/mm3      Monocytes Absolute 0.26 10*3/mm3      Eosinophils Absolute 0.79 10*3/mm3      Basophils Absolute 0.13 10*3/mm3      nRBC 4.0 /100 WBC      Anisocytosis Slight/1+     Hypochromia Mod/2+     Microcytes Slight/1+     Poikilocytes Slight/1+     Polychromasia Slight/1+     WBC Morphology Normal     Giant Platelets Large/3+    Blood Gas, Arterial [[id number]]  (Abnormal) Collected:  11/15/19 1356    Specimen:  Arterial Blood Updated:  11/15/19 1401     Site Left Radial     Dov's Test Positive     pH, Arterial 7.355 pH units      pCO2, Arterial 57.5 mm Hg      Comment: 83 Value above reference range        pO2, Arterial 45.9 mm Hg      Comment: 85 Value below critical limit        HCO3, Arterial 32.1 mmol/L      Comment: 83 Value above reference range        Base Excess, Arterial 5.7 mmol/L      Comment: 83 Value above reference range        O2 Saturation, Arterial 80.0 %      Comment: 84 Value below reference range        Temperature 37.0 C      Barometric Pressure for Blood Gas 759 mmHg      Modality Room Air     Ventilator Mode NA     Notified Who DR YOU     Notified By [id number]     Notified Time  11/15/2019 14:01     Collected by [id number]     Comment: Meter: [id number]     :  [id number]       Blood Culture - Blood, Hand, Right [[id number]] Collected:  11/15/19 1359    Specimen:  Blood from Hand, Right Updated:  11/15/19 1424    Blood Gas, Arterial [[id number]]  (Abnormal) Collected:  11/15/19 1514    Specimen:  Arterial Blood Updated:  11/15/19 1518     Site Left Radial     Dov's Test Positive     pH, Arterial 7.321 pH units      Comment: 84 Value below reference range        pCO2, Arterial 64.5 mm Hg      Comment: 83 Value above reference range        pO2, Arterial 74.0 mm Hg      Comment: 84 Value below reference range        HCO3, Arterial 33.3 mmol/L      Comment: 83 Value above reference range        Base Excess, Arterial 6.2 mmol/L      Comment: 83 Value above reference range        O2 Saturation, Arterial 93.9 %      Comment: 84 Value below reference range        Temperature 37.0 C      Barometric Pressure for Blood Gas 759 mmHg      Modality Heated HFNC     FIO2 35 %      Flow Rate 40.0 lpm      Ventilator Mode NA     Collected by [id number]     Comment: Meter: [id number]     :  [id number]       Blood Gas, Arterial [[id number]]  (Abnormal) Collected:  11/15/19 1630    Specimen:  Arterial Blood Updated:  11/15/19 1640     Site Right Radial     Dov's Test Positive     pH, Arterial 7.309 pH units      Comment: 84 Value below reference range        pCO2, Arterial 66.1 mm Hg      Comment: 83 Value above reference range        pO2, Arterial 76.8 mm Hg      Comment: 84 Value below reference range        HCO3, Arterial 33.2 mmol/L      Comment: 83 Value above reference range        Base Excess, Arterial 5.9 mmol/L      Comment: 83 Value above reference range        O2 Saturation, Arterial 94.4 %      Temperature 37.0 C      Barometric Pressure for Blood Gas 759 mmHg      Modality BiPap     FIO2 30 %      Ventilator Mode NA     Set Mech Resp Rate 16.0     IPAP 20     Comment: Meter:  [id number]     :  [id number]        EPAP 6     Collected by [id number]          XR Chest 1 View   Final Result   1. COPD no acute cardiopulmonary process.           This report was finalized on 11/15/2019 14:14 by Dr. Kailash Crouch MD.      XR Chest 1 View    (Results Pending)       ED Course  ED Course as of Nov 15 1811   Fri Nov 15, 2019   1720 Patient has continued to worsen in terms of his mental status here in the emergency room.  Repeat gases have shown an increasing CO2 level despite switching to BiPAP.  I spoke with Dr. Nelson and we will admit for further care.  We will watch the patient very closely to make sure he does not indeed be intubated.  We are adjusting BiPAP levels in order to try to get him better response to treatment.  [TR]   1809 Patient continues to fatigue and get worse with his mental status.  At the request of Dr. Lamb we did go ahead and intubate without any difficulty.  Respiratory did intubate the patient without any difficulty with my supervision and observation.  [TR]      ED Course User Index  [TR] Ike Barr Jr., MD          [facility]    Final diagnoses:   Acute respiratory failure with hypoxia and hypercapnia (CMS/HCC)          Ike Barr Jr., MD  11/15/19 1811

## 2019-11-16 ENCOUNTER — APPOINTMENT (OUTPATIENT)
Dept: CT IMAGING | Facility: HOSPITAL | Age: 79
End: 2019-11-16

## 2019-11-16 ENCOUNTER — APPOINTMENT (OUTPATIENT)
Dept: GENERAL RADIOLOGY | Facility: HOSPITAL | Age: 79
End: 2019-11-16

## 2019-11-16 ENCOUNTER — APPOINTMENT (OUTPATIENT)
Dept: CARDIOLOGY | Facility: HOSPITAL | Age: 79
End: 2019-11-16

## 2019-11-16 LAB
ALBUMIN SERPL-MCNC: 3.1 G/DL (ref 3.5–5.2)
ALBUMIN/GLOB SERPL: 0.8 G/DL
ALP SERPL-CCNC: 119 U/L (ref 39–117)
ALT SERPL W P-5'-P-CCNC: 13 U/L (ref 1–41)
AMPHET+METHAMPHET UR QL: NEGATIVE
AMPHETAMINES UR QL: NEGATIVE
ANION GAP SERPL CALCULATED.3IONS-SCNC: 8 MMOL/L (ref 5–15)
ARTERIAL PATENCY WRIST A: POSITIVE
AST SERPL-CCNC: 23 U/L (ref 1–40)
ATMOSPHERIC PRESS: 758 MMHG
BARBITURATES UR QL SCN: NEGATIVE
BASE EXCESS BLDA CALC-SCNC: 7.2 MMOL/L (ref 0–2)
BASOPHILS # BLD AUTO: 0.01 10*3/MM3 (ref 0–0.2)
BASOPHILS NFR BLD AUTO: 0.2 % (ref 0–1.5)
BDY SITE: ABNORMAL
BENZODIAZ UR QL SCN: NEGATIVE
BH CV ECHO MEAS - AO MAX PG (FULL): 8 MMHG
BH CV ECHO MEAS - AO MAX PG: 11.7 MMHG
BH CV ECHO MEAS - AO MEAN PG (FULL): 6 MMHG
BH CV ECHO MEAS - AO MEAN PG: 8 MMHG
BH CV ECHO MEAS - AO ROOT AREA (BSA CORRECTED): 1.8
BH CV ECHO MEAS - AO ROOT AREA: 10.2 CM^2
BH CV ECHO MEAS - AO ROOT DIAM: 3.6 CM
BH CV ECHO MEAS - AO V2 MAX: 171 CM/SEC
BH CV ECHO MEAS - AO V2 MEAN: 131 CM/SEC
BH CV ECHO MEAS - AO V2 VTI: 38.2 CM
BH CV ECHO MEAS - AVA(I,A): 1.7 CM^2
BH CV ECHO MEAS - AVA(I,D): 1.7 CM^2
BH CV ECHO MEAS - AVA(V,A): 1.8 CM^2
BH CV ECHO MEAS - AVA(V,D): 1.8 CM^2
BH CV ECHO MEAS - BSA(HAYCOCK): 2.1 M^2
BH CV ECHO MEAS - BSA: 2 M^2
BH CV ECHO MEAS - BZI_BMI: 34.2 KILOGRAMS/M^2
BH CV ECHO MEAS - BZI_METRIC_HEIGHT: 162.6 CM
BH CV ECHO MEAS - BZI_METRIC_WEIGHT: 90.3 KG
BH CV ECHO MEAS - EDV(CUBED): 120.6 ML
BH CV ECHO MEAS - EDV(MOD-SP4): 176 ML
BH CV ECHO MEAS - EDV(TEICH): 115 ML
BH CV ECHO MEAS - EF(CUBED): 68.5 %
BH CV ECHO MEAS - EF(MOD-SP4): 66.6 %
BH CV ECHO MEAS - EF(TEICH): 59.9 %
BH CV ECHO MEAS - ESV(CUBED): 37.9 ML
BH CV ECHO MEAS - ESV(MOD-SP4): 58.7 ML
BH CV ECHO MEAS - ESV(TEICH): 46.1 ML
BH CV ECHO MEAS - FS: 32 %
BH CV ECHO MEAS - IVS/LVPW: 1.1
BH CV ECHO MEAS - IVSD: 1.3 CM
BH CV ECHO MEAS - LA DIMENSION: 4.4 CM
BH CV ECHO MEAS - LA/AO: 1.2
BH CV ECHO MEAS - LAT PEAK E' VEL: 9.8 CM/SEC
BH CV ECHO MEAS - LV DIASTOLIC VOL/BSA (35-75): 90.2 ML/M^2
BH CV ECHO MEAS - LV MASS(C)D: 258.4 GRAMS
BH CV ECHO MEAS - LV MASS(C)DI: 132.4 GRAMS/M^2
BH CV ECHO MEAS - LV MAX PG: 3.7 MMHG
BH CV ECHO MEAS - LV MEAN PG: 2 MMHG
BH CV ECHO MEAS - LV SYSTOLIC VOL/BSA (12-30): 30.1 ML/M^2
BH CV ECHO MEAS - LV V1 MAX: 96.1 CM/SEC
BH CV ECHO MEAS - LV V1 MEAN: 64.7 CM/SEC
BH CV ECHO MEAS - LV V1 VTI: 21.1 CM
BH CV ECHO MEAS - LVIDD: 4.9 CM
BH CV ECHO MEAS - LVIDS: 3.4 CM
BH CV ECHO MEAS - LVLD AP4: 9.7 CM
BH CV ECHO MEAS - LVLS AP4: 7.4 CM
BH CV ECHO MEAS - LVOT AREA (M): 3.1 CM^2
BH CV ECHO MEAS - LVOT AREA: 3.1 CM^2
BH CV ECHO MEAS - LVOT DIAM: 2 CM
BH CV ECHO MEAS - LVPWD: 1.3 CM
BH CV ECHO MEAS - MED PEAK E' VEL: 6.74 CM/SEC
BH CV ECHO MEAS - MV A MAX VEL: 114 CM/SEC
BH CV ECHO MEAS - MV DEC SLOPE: 482 CM/SEC^2
BH CV ECHO MEAS - MV DEC TIME: 0.19 SEC
BH CV ECHO MEAS - MV E MAX VEL: 91.7 CM/SEC
BH CV ECHO MEAS - MV E/A: 0.8
BH CV ECHO MEAS - SI(AO): 199.2 ML/M^2
BH CV ECHO MEAS - SI(CUBED): 42.3 ML/M^2
BH CV ECHO MEAS - SI(LVOT): 34 ML/M^2
BH CV ECHO MEAS - SI(MOD-SP4): 60.1 ML/M^2
BH CV ECHO MEAS - SI(TEICH): 35.3 ML/M^2
BH CV ECHO MEAS - SV(AO): 388.8 ML
BH CV ECHO MEAS - SV(CUBED): 82.6 ML
BH CV ECHO MEAS - SV(LVOT): 66.3 ML
BH CV ECHO MEAS - SV(MOD-SP4): 117.3 ML
BH CV ECHO MEAS - SV(TEICH): 68.9 ML
BH CV ECHO MEASUREMENTS AVERAGE E/E' RATIO: 11.09
BILIRUB SERPL-MCNC: 0.6 MG/DL (ref 0.2–1.2)
BODY TEMPERATURE: 37 C
BUN BLD-MCNC: 16 MG/DL (ref 8–23)
BUN/CREAT SERPL: 15.5 (ref 7–25)
BUPRENORPHINE SERPL-MCNC: NEGATIVE NG/ML
CALCIUM SPEC-SCNC: 8.5 MG/DL (ref 8.6–10.5)
CANNABINOIDS SERPL QL: NEGATIVE
CHLORIDE SERPL-SCNC: 104 MMOL/L (ref 98–107)
CHOLEST SERPL-MCNC: 103 MG/DL (ref 0–200)
CO2 SERPL-SCNC: 30 MMOL/L (ref 22–29)
COCAINE UR QL: NEGATIVE
CREAT BLD-MCNC: 1.03 MG/DL (ref 0.76–1.27)
DEPRECATED RDW RBC AUTO: 51.7 FL (ref 37–54)
EOSINOPHIL # BLD AUTO: 0 10*3/MM3 (ref 0–0.4)
EOSINOPHIL NFR BLD AUTO: 0 % (ref 0.3–6.2)
ERYTHROCYTE [DISTWIDTH] IN BLOOD BY AUTOMATED COUNT: 20.2 % (ref 12.3–15.4)
GFR SERPL CREATININE-BSD FRML MDRD: 70 ML/MIN/1.73
GLOBULIN UR ELPH-MCNC: 3.7 GM/DL
GLUCOSE BLD-MCNC: 171 MG/DL (ref 65–99)
HBA1C MFR BLD: 6 % (ref 4.8–5.6)
HCO3 BLDA-SCNC: 31.2 MMOL/L (ref 20–26)
HCT VFR BLD AUTO: 29.9 % (ref 37.5–51)
HDLC SERPL-MCNC: 25 MG/DL (ref 40–60)
HGB BLD-MCNC: 8.1 G/DL (ref 13–17.7)
HOROWITZ INDEX BLD+IHG-RTO: 40 %
LDLC SERPL CALC-MCNC: 49 MG/DL (ref 0–100)
LDLC/HDLC SERPL: 1.96 {RATIO}
LYMPHOCYTES # BLD AUTO: 0.86 10*3/MM3 (ref 0.7–3.1)
LYMPHOCYTES NFR BLD AUTO: 17.3 % (ref 19.6–45.3)
Lab: ABNORMAL
MAXIMAL PREDICTED HEART RATE: 143 BPM
MCH RBC QN AUTO: 19.4 PG (ref 26.6–33)
MCHC RBC AUTO-ENTMCNC: 27.1 G/DL (ref 31.5–35.7)
MCV RBC AUTO: 71.7 FL (ref 79–97)
METHADONE UR QL SCN: NEGATIVE
MODALITY: ABNORMAL
MONOCYTES # BLD AUTO: 0.12 10*3/MM3 (ref 0.1–0.9)
MONOCYTES NFR BLD AUTO: 2.4 % (ref 5–12)
NEUTROPHILS # BLD AUTO: 3.94 10*3/MM3 (ref 1.7–7)
NEUTROPHILS NFR BLD AUTO: 79.3 % (ref 42.7–76)
OPIATES UR QL: NEGATIVE
OXYCODONE UR QL SCN: NEGATIVE
PCO2 BLDA: 41.1 MM HG (ref 35–45)
PCP UR QL SCN: NEGATIVE
PEEP RESPIRATORY: 5 CM[H2O]
PH BLDA: 7.49 PH UNITS (ref 7.35–7.45)
PLATELET # BLD AUTO: 376 10*3/MM3 (ref 140–450)
PMV BLD AUTO: 9.9 FL (ref 6–12)
PO2 BLDA: 56.8 MM HG (ref 83–108)
POTASSIUM BLD-SCNC: 4.3 MMOL/L (ref 3.5–5.2)
PROPOXYPH UR QL: NEGATIVE
PROT SERPL-MCNC: 6.8 G/DL (ref 6–8.5)
RBC # BLD AUTO: 4.17 10*6/MM3 (ref 4.14–5.8)
SAO2 % BLDCOA: 90.9 % (ref 94–99)
SET MECH RESP RATE: 18
SODIUM BLD-SCNC: 142 MMOL/L (ref 136–145)
STRESS TARGET HR: 122 BPM
TRICYCLICS UR QL SCN: NEGATIVE
TRIGL SERPL-MCNC: 145 MG/DL (ref 0–150)
TSH SERPL DL<=0.05 MIU/L-ACNC: 0.41 UIU/ML (ref 0.27–4.2)
VENTILATOR MODE: AC
VLDLC SERPL-MCNC: 29 MG/DL
VT ON VENT VENT: 600 ML
WBC NRBC COR # BLD: 4.97 10*3/MM3 (ref 3.4–10.8)

## 2019-11-16 PROCEDURE — 25010000002 METHYLPREDNISOLONE PER 125 MG: Performed by: NURSE PRACTITIONER

## 2019-11-16 PROCEDURE — 87186 SC STD MICRODIL/AGAR DIL: CPT | Performed by: NURSE PRACTITIONER

## 2019-11-16 PROCEDURE — 87205 SMEAR GRAM STAIN: CPT | Performed by: NURSE PRACTITIONER

## 2019-11-16 PROCEDURE — 25010000002 ENOXAPARIN PER 10 MG: Performed by: NURSE PRACTITIONER

## 2019-11-16 PROCEDURE — 36600 WITHDRAWAL OF ARTERIAL BLOOD: CPT

## 2019-11-16 PROCEDURE — 93306 TTE W/DOPPLER COMPLETE: CPT

## 2019-11-16 PROCEDURE — 80061 LIPID PANEL: CPT | Performed by: NURSE PRACTITIONER

## 2019-11-16 PROCEDURE — 94799 UNLISTED PULMONARY SVC/PX: CPT

## 2019-11-16 PROCEDURE — 25010000002 PROPOFOL 10 MG/ML EMULSION: Performed by: FAMILY MEDICINE

## 2019-11-16 PROCEDURE — 99223 1ST HOSP IP/OBS HIGH 75: CPT | Performed by: INTERNAL MEDICINE

## 2019-11-16 PROCEDURE — 71275 CT ANGIOGRAPHY CHEST: CPT

## 2019-11-16 PROCEDURE — 87070 CULTURE OTHR SPECIMN AEROBIC: CPT | Performed by: NURSE PRACTITIONER

## 2019-11-16 PROCEDURE — 80053 COMPREHEN METABOLIC PANEL: CPT | Performed by: NURSE PRACTITIONER

## 2019-11-16 PROCEDURE — 94770: CPT

## 2019-11-16 PROCEDURE — 83036 HEMOGLOBIN GLYCOSYLATED A1C: CPT | Performed by: NURSE PRACTITIONER

## 2019-11-16 PROCEDURE — 80307 DRUG TEST PRSMV CHEM ANLYZR: CPT | Performed by: FAMILY MEDICINE

## 2019-11-16 PROCEDURE — 25010000002 MORPHINE PER 10 MG: Performed by: FAMILY MEDICINE

## 2019-11-16 PROCEDURE — 82803 BLOOD GASES ANY COMBINATION: CPT

## 2019-11-16 PROCEDURE — 0 IOPAMIDOL PER 1 ML: Performed by: FAMILY MEDICINE

## 2019-11-16 PROCEDURE — 84443 ASSAY THYROID STIM HORMONE: CPT | Performed by: NURSE PRACTITIONER

## 2019-11-16 PROCEDURE — 93306 TTE W/DOPPLER COMPLETE: CPT | Performed by: INTERNAL MEDICINE

## 2019-11-16 PROCEDURE — 74018 RADEX ABDOMEN 1 VIEW: CPT

## 2019-11-16 PROCEDURE — 94003 VENT MGMT INPAT SUBQ DAY: CPT

## 2019-11-16 PROCEDURE — 25010000002 PERFLUTREN 6.52 MG/ML SUSPENSION: Performed by: FAMILY MEDICINE

## 2019-11-16 PROCEDURE — 85025 COMPLETE CBC W/AUTO DIFF WBC: CPT | Performed by: NURSE PRACTITIONER

## 2019-11-16 PROCEDURE — 25010000002 CEFTRIAXONE PER 250 MG: Performed by: NURSE PRACTITIONER

## 2019-11-16 RX ORDER — FAMOTIDINE 10 MG/ML
20 INJECTION, SOLUTION INTRAVENOUS EVERY 12 HOURS SCHEDULED
Status: DISCONTINUED | OUTPATIENT
Start: 2019-11-16 | End: 2019-11-18

## 2019-11-16 RX ORDER — SODIUM CHLORIDE 9 MG/ML
100 INJECTION, SOLUTION INTRAVENOUS CONTINUOUS
Status: DISCONTINUED | OUTPATIENT
Start: 2019-11-16 | End: 2019-11-18

## 2019-11-16 RX ORDER — MORPHINE SULFATE 2 MG/ML
1 INJECTION, SOLUTION INTRAMUSCULAR; INTRAVENOUS
Status: DISCONTINUED | OUTPATIENT
Start: 2019-11-16 | End: 2019-11-19

## 2019-11-16 RX ADMIN — PROPOFOL 30 MCG/KG/MIN: 10 INJECTION, EMULSION INTRAVENOUS at 16:55

## 2019-11-16 RX ADMIN — SODIUM CHLORIDE, PRESERVATIVE FREE 10 ML: 5 INJECTION INTRAVENOUS at 20:26

## 2019-11-16 RX ADMIN — IPRATROPIUM BROMIDE AND ALBUTEROL SULFATE 3 ML: 2.5; .5 SOLUTION RESPIRATORY (INHALATION) at 13:57

## 2019-11-16 RX ADMIN — METHYLPREDNISOLONE SODIUM SUCCINATE 60 MG: 125 INJECTION, POWDER, FOR SOLUTION INTRAMUSCULAR; INTRAVENOUS at 16:00

## 2019-11-16 RX ADMIN — PROPOFOL 35 MCG/KG/MIN: 10 INJECTION, EMULSION INTRAVENOUS at 22:33

## 2019-11-16 RX ADMIN — IPRATROPIUM BROMIDE AND ALBUTEROL SULFATE 3 ML: 2.5; .5 SOLUTION RESPIRATORY (INHALATION) at 19:27

## 2019-11-16 RX ADMIN — PROPOFOL 25 MCG/KG/MIN: 10 INJECTION, EMULSION INTRAVENOUS at 10:41

## 2019-11-16 RX ADMIN — METHYLPREDNISOLONE SODIUM SUCCINATE 60 MG: 125 INJECTION, POWDER, FOR SOLUTION INTRAMUSCULAR; INTRAVENOUS at 08:30

## 2019-11-16 RX ADMIN — IOPAMIDOL 100 ML: 755 INJECTION, SOLUTION INTRAVENOUS at 18:15

## 2019-11-16 RX ADMIN — PROPOFOL 50 MCG/KG/MIN: 10 INJECTION, EMULSION INTRAVENOUS at 05:25

## 2019-11-16 RX ADMIN — FAMOTIDINE 20 MG: 10 INJECTION, SOLUTION INTRAVENOUS at 20:25

## 2019-11-16 RX ADMIN — SODIUM CHLORIDE 100 ML/HR: 9 INJECTION, SOLUTION INTRAVENOUS at 07:50

## 2019-11-16 RX ADMIN — MORPHINE SULFATE 1 MG: 2 INJECTION, SOLUTION INTRAMUSCULAR; INTRAVENOUS at 16:55

## 2019-11-16 RX ADMIN — SODIUM CHLORIDE 100 ML/HR: 9 INJECTION, SOLUTION INTRAVENOUS at 18:19

## 2019-11-16 RX ADMIN — FAMOTIDINE 20 MG: 10 INJECTION, SOLUTION INTRAVENOUS at 10:59

## 2019-11-16 RX ADMIN — IPRATROPIUM BROMIDE AND ALBUTEROL SULFATE 3 ML: 2.5; .5 SOLUTION RESPIRATORY (INHALATION) at 08:09

## 2019-11-16 RX ADMIN — CEFTRIAXONE SODIUM 1 G: 1 INJECTION, POWDER, FOR SOLUTION INTRAMUSCULAR; INTRAVENOUS at 08:23

## 2019-11-16 RX ADMIN — ENOXAPARIN SODIUM 40 MG: 40 INJECTION SUBCUTANEOUS at 17:40

## 2019-11-16 RX ADMIN — METHYLPREDNISOLONE SODIUM SUCCINATE 60 MG: 125 INJECTION, POWDER, FOR SOLUTION INTRAMUSCULAR; INTRAVENOUS at 20:31

## 2019-11-16 RX ADMIN — METHYLPREDNISOLONE SODIUM SUCCINATE 60 MG: 125 INJECTION, POWDER, FOR SOLUTION INTRAMUSCULAR; INTRAVENOUS at 05:05

## 2019-11-16 RX ADMIN — PROPOFOL 50 MCG/KG/MIN: 10 INJECTION, EMULSION INTRAVENOUS at 01:45

## 2019-11-16 RX ADMIN — PERFLUTREN 8.48 MG: 6.52 INJECTION, SUSPENSION INTRAVENOUS at 16:52

## 2019-11-16 NOTE — PLAN OF CARE
Problem: Patient Care Overview  Goal: Plan of Care Review  Outcome: Ongoing (interventions implemented as appropriate)   11/16/19 1152   Coping/Psychosocial   Plan of Care Reviewed With patient   Plan of Care Review   Progress no change   OTHER   Outcome Summary Received consult to initiate enteral nutrition via OG tube. Pt is mechanically ventilated, sedated with propofol, and restrained at present. Propofol is currently providing 352 kcal. Recommend to initiate Impact Peptide 1.5 at 20mL/hour x 8 hours, then advance rate by 10mL every 4 hours to goal rate of 43mL/hour. With propofol kcal, TF will provide 1771 kcal and 89g protein. This will meet 98% of est'd kcal needs and 101% of protein needs. If propofol is adjusted, TF may require adjustment. Will follow for nutrition needs.       Problem: Nutrition, Enteral (Adult)  Goal: Signs and Symptoms of Listed Potential Problems Will be Absent, Minimized or Managed (Nutrition, Enteral)  Outcome: Ongoing (interventions implemented as appropriate)

## 2019-11-16 NOTE — NURSING NOTE
Call placed to Jose Alejandro, [phone] pt daughter, to obtain consent to CT. No answer and no voicemail available.  Will continue to attempt to reach daughter.

## 2019-11-16 NOTE — CONSULTS
PULMONARY AND CRITICAL CARE CONSULT - [facility]    Rodrigo Javed   MR# [id number]  Acct# [id number]  11/16/2019   10:18 AM    Referring Provider: Maximino Lamb DO    Chief Complaint: Mechanically ventilated    HPI: We are consulted by Maximino Lamb DO to see this 79 y.o. male born on 1940.  Patient was admitted through the emergency room last night with respiratory failure.  He was initially placed on Vapotherm and then tried on BiPAP but continued to decline so the decision was made to intubate him.  He has a reported history of smoking but little else is known about the patient and there is no family here currently.  The patient is obviously unable to provide any information himself.  We have been consulted for ventilator management.  This case has been discussed with Dr. Lamb this morning.    Past Medical History   has a past medical history of Cataract and Hypertension.   has a past surgical history that includes Hemorrhoid surgery and Colonoscopy.  Allergies   Allergen Reactions   • Tetracycline Other (See Comments)     Medications    cefTRIAXone 1 g Intravenous Daily   enoxaparin 40 mg Subcutaneous Q24H   ipratropium-albuterol 3 mL Nebulization Q6H - RT   LORazepam 1 mg Intravenous Once   methylPREDNISolone sodium succinate 60 mg Intravenous Q6H   sodium chloride 10 mL Intravenous Q12H       propofol 5-50 mcg/kg/min Last Rate: 25 mcg/kg/min (11/16/19 0813)   sodium chloride 100 mL/hr Last Rate: 100 mL/hr (11/16/19 0750)     Social History   reports that he has been smoking.  He has a 82.50 pack-year smoking history. He has never used smokeless tobacco. He reports that he drinks about 8.4 oz of alcohol per week. He reports that he does not use drugs.    Family History  family history is not on file.    Review of Systems:  Cannot obtain due to mechanical ventilation.  The patient notably is critically ill and connected to a ventilator.  As such patient cannot communicate  and provide any history whatsoever, including any history of present illness or interval history since arrival or review of systems. The interested reviewer may note this fact, as an attempt has been made at collecting and documenting these portions of the patient history, but this information is unobtainable despite attempted review and therefore cannot be documented at this time.     Physical Exam:  Temp:  [97.9 °F (36.6 °C)-98.1 °F (36.7 °C)] 97.9 °F (36.6 °C)  Heart Rate:  [] 78  Resp:  [10-20] 10  BP: ()/() 129/76  FiO2 (%):  [30 %-60 %] 40 %    Intake/Output Summary (Last 24 hours) at 11/16/2019 1018  Last data filed at 11/16/2019 0759  Gross per 24 hour   Intake 442 ml   Output 825 ml   Net -383 ml         11/15/19  1347 11/16/19  0708   Weight: 88.9 kg (196 lb) 90.3 kg (199 lb 2 oz)     SpO2 Percentage    11/16/19 0809 11/16/19 0830 11/16/19 0900   SpO2: 99% 91% 91%     Physical Exam   Constitutional: He appears well-developed and well-nourished. He appears ill (Chronically ill-appearing). He is sedated, intubated and restrained.   He is obese   HENT:   Head: Normocephalic and atraumatic.   ET tube in place   Eyes: Pupils are equal, round, and reactive to light.   Cardiovascular: Normal rate and regular rhythm.   Pulmonary/Chest: He is intubated.   No vent dyssynchrony noted   Abdominal: Soft.   Genitourinary:   Genitourinary Comments: Walters catheter in place   Musculoskeletal: He exhibits no edema.   Neurological: He is unresponsive.   Sedated and intubated   Skin: Skin is warm and dry. sedated.   Nursing note and vitals reviewed.    Ventilator Settings:     Vt (Set, L): 0.6 L  Resp Rate (Set): 10     FiO2 (%): 40 %  PEEP/CPAP (cm H2O): 5 cm H20  Minute Ventilation (L/min) (Obs): 10.7 L/min  Resp Rate (Observed) Vent: 17  I:E Ratio (Set): 1:2.00  I:E Ratio (Obs): 1:2.3  PIP Observed (cm H2O): 24 cm H2O  Plateau Pressure (cm H2O): 18 cm H2O     Results from last 7 days   Lab Units  11/16/19  0704 11/15/19  1355   WBC 10*3/mm3 4.97 6.57   HEMOGLOBIN g/dL 8.1* 8.5*   PLATELETS 10*3/mm3 376 372     Results from last 7 days   Lab Units 11/16/19  0704 11/15/19  1355   SODIUM mmol/L 142 144   POTASSIUM mmol/L 4.3 3.8   CO2 mmol/L 30.0* 33.0*   BUN mg/dL 16 14   CREATININE mg/dL 1.03 1.06   MAGNESIUM mg/dL  --  2.0   GLUCOSE mg/dL 171* 128*     Results from last 7 days   Lab Units 11/16/19  0349 11/15/19  2043 11/15/19  1630   PH, ARTERIAL pH units 7.489* 7.291* 7.309*   PCO2, ARTERIAL mm Hg 41.1 66.8* 66.1*   PO2 ART mm Hg 56.8* 121.0* 76.8*   FIO2 % 40 60 30        Lab Results   Component Value Date    PROBNP 1,550.0 11/15/2019     Recent radiology:   Imaging Results (Last 72 Hours)     Procedure Component Value Units Date/Time    XR Chest 1 View [[id number]] Collected:  11/15/19 2005     Updated:  11/15/19 2008    Narrative:       Frontal upright radiograph of the chest 11/15/2019 6:15 PM CST     COMPARISON: 11/15/2019 at 1:58 PM     HISTORY postintubation.     FINDINGS:   The lungs are clear. Cardiac silhouettes mildly enlarged. Endotracheal  tube is present satisfactorily position.      The osseous structures and surrounding soft tissues demonstrate no acute  abnormality.       Impression:       1. Mild cardiomegaly no evidence of pulmonary vascular congestion.        This report was finalized on 11/15/2019 20:05 by Dr. Kailash Crouch MD.    XR Chest 1 View [[id number]] Collected:  11/15/19 1413     Updated:  11/15/19 1417    Narrative:       Frontal upright radiograph of the chest 11/15/2019 2:09 PM CST     COMPARISON: 10/30/2019.     HISTORY: Short of breath.     FINDINGS:   Hyperinflation flattening diaphragms noted consistent with COPD.. The  cardiac silhouettes mildly enlarged but unchanged..      The osseous structures and surrounding soft tissues demonstrate no acute  abnormality.       Impression:       1. COPD no acute cardiopulmonary process.        This report was finalized on  11/15/2019 14:14 by Dr. Kailash Crouch MD.        My radiograph interpretation/independent review of imaging: ET tube in place, no acute infiltrates  Other test results (not lab or imaging):   None  Independent review of ekg: Normal sinus rhythm 88, QTc 435    Problem List as identified by Epic (may contain historical, inactive problems)  Patient Active Problem List   Diagnosis   • Acute respiratory failure with hypoxia and hypercapnia (CMS/HCC)   • Hypertension   • Anemia     Pulmonary Assessment:  SEVERE ACUTE RESPIRATORY FAILURE REQUIRING MECHANICAL VENTILATION  This is a threat to life or pulmonary function, high risk, due to acute hypercapnic and hypoxemic respiratory failure that failed to improve with Vapotherm or BiPAP and ultimately required mechanical ventilation.    New problem (to me), with additional workup planned: Long smoking history, probable COPD.  No PFTs to confirm.    New problem (to me), no additional workup planned: Anemia    Other problems either stable, failing to improve or worsenin. Obesity    Recommend/plan:   · Vent rate decreased to 10 FiO2 decreased to 40%.  Continue on mechanical ventilation today.  · Will assess patient for spontaneous breathing trial readiness tomorrow.  · Bronchodilator treatments  · Obtain sputum culture  · Daily chest x-ray and ABG while on vent  · DVT and stress ulcer prophylaxis  · Echocardiogram is pending  · Further recommendations per Dr. Ramey.    Thank you for this consult.  We will follow along.  Electronically signed by ADAN Barrientos on 2019 at 10:18 AM     Physician substantive portion:  Patient has presented with hypoxic respiratory failure and has required intubation and mechanical ventilation.  We have made some ventilation setting changes as discussed above today.  He is sedated mechanically ventilated and still not stabilized enough from his presenting problem to begin spontaneous breathing trials.  Exam shows no  distress.  Breath sounds are diminished.  There are no wheezes.  There is no ventilator dyssynchrony.  Await echocardiogram.  Follow-up ABG.  Review of his series of blood gases would suggest that there is some degree of chronic respiratory acidosis perhaps underlying obstructive lung disease.  Review of old records shows no history of spirometry.  This could be performed once he is recovered from his current illness    I have seen and examined patient personally, performing a face-to-face diagnostic evaluation with plan of care reviewed and developed with APRN and nursing staff. I have addended and/or modified the above history of present illness, physical examination, and assessment and plan to reflect my findings and impressions. Essential elements of the care plan were discussed with APRN above.  Agree with findings and assessment/plan as documented above.    Electronically signed by Waylon Ramey MD, on 11/16/2019, 1:07 PM

## 2019-11-16 NOTE — PROGRESS NOTES
Continued Stay Note  [facility]     Patient Name: Rodrigo Javed  MRN: [id number]  Today's Date: 11/16/2019    Admit Date: 11/15/2019    Discharge Plan     Row Name 11/16/19 1232       Plan    Plan Comments  SW received phone call in regards to finding a next of kin for pt to consent with testing. RN has called emergency contacts this AM and they stated that they had no information on family. SW spoke with emergency contacts again this afternoon explaining the importance of getting a phone number from family members and Luz Maria states that she didnt want to be in the middle of a family fight because pt did not want his children in his life due to bad decisions. SW explained that the only way to do proper testing was to get information on next of kin. Luz Maria finally then stated that she does know somewhat about his daughter. BURAK provided phone number to ICU for Luz Maria to walk nurses through finding pts daughter. Next step is to call the law to see if they will be able to find next of kin. BURAK will follow.       1240- BURAK spoke with dispatch of [facility] who states that they have no information on pt.         Discharge Codes    No documentation.             Tegan Henley

## 2019-11-16 NOTE — PROGRESS NOTES
[facility] Medicine Services  INPATIENT PROGRESS NOTE    Length of Stay: 1  Date of Admission: 11/15/2019  Primary Care Physician: Delvin Caldera MD    Subjective     Chief Complaint:     Hypoxic respiratory failure    HPI     The patient remains intubated, restrained with soft restraints and sedated.  Per the nursing staff, when sedation is lightened he attempts to pull out IV lines and endotracheal tube.  He is able to move all 4 extremities when off sedation.  The patient continues to require an FiO2 of 40 for proper oxygenation.  CT angiogram of the chest has been ordered in order to assess for possible PE.  Hopefully we can find a family member to authorize that study today.  Echocardiogram has been ordered and is pending.  BNP at the time of admission was within normal limits.  CMP this morning is unremarkable except for a glucose of 171 and albumin of 3.1.  CBC is unremarkable except for hemoglobin of 8.1.    Review of Systems     All pertinent negatives and positives are as above. All other systems have been reviewed and are negative unless otherwise stated.     Objective    Temp:  [97.9 °F (36.6 °C)-98.1 °F (36.7 °C)] 97.9 °F (36.6 °C)  Heart Rate:  [] 78  Resp:  [10-20] 10  BP: ()/() 129/76  FiO2 (%):  [30 %-60 %] 40 %    Lab Results (last 24 hours)     Procedure Component Value Units Date/Time    Comprehensive Metabolic Panel [[id number]]  (Abnormal) Collected:  11/16/19 0704    Specimen:  Blood Updated:  11/16/19 0737     Glucose 171 mg/dL      BUN 16 mg/dL      Creatinine 1.03 mg/dL      Sodium 142 mmol/L      Potassium 4.3 mmol/L      Chloride 104 mmol/L      CO2 30.0 mmol/L      Calcium 8.5 mg/dL      Total Protein 6.8 g/dL      Albumin 3.10 g/dL      ALT (SGPT) 13 U/L      AST (SGOT) 23 U/L      Alkaline Phosphatase 119 U/L      Total Bilirubin 0.6 mg/dL      eGFR Non African Amer 70 mL/min/1.73      Globulin 3.7 gm/dL      A/G Ratio 0.8 g/dL       BUN/Creatinine Ratio 15.5     Anion Gap 8.0 mmol/L     Narrative:       GFR Normal >60  Chronic Kidney Disease <60  Kidney Failure <15    Lipid Panel [[id number]]  (Abnormal) Collected:  11/16/19 0704    Specimen:  Blood Updated:  11/16/19 0737     Total Cholesterol 103 mg/dL      Triglycerides 145 mg/dL      HDL Cholesterol 25 mg/dL      LDL Cholesterol  49 mg/dL      VLDL Cholesterol 29 mg/dL      LDL/HDL Ratio 1.96    Narrative:       Cholesterol Reference Ranges  (U.S. Department of Health and Human Services ATP III Classifications)    Desirable          <200 mg/dL  Borderline High    200-239 mg/dL  High Risk          >240 mg/dL      Triglyceride Reference Ranges  (U.S. Department of Health and Human Services ATP III Classifications)    Normal           <150 mg/dL  Borderline High  150-199 mg/dL  High             200-499 mg/dL  Very High        >500 mg/dL    HDL Reference Ranges  (U.S. Department of Health and Human Services ATP III Classifcations)    Low     <40 mg/dl (major risk factor for CHD)  High    >60 mg/dl ('negative' risk factor for CHD)        LDL Reference Ranges  (U.S. Department of Health and Human Services ATP III Classifcations)    Optimal          <100 mg/dL  Near Optimal     100-129 mg/dL  Borderline High  130-159 mg/dL  High             160-189 mg/dL  Very High        >189 mg/dL    TSH [[id number]]  (Normal) Collected:  11/16/19 0704    Specimen:  Blood Updated:  11/16/19 0737     TSH 0.407 uIU/mL     CBC Auto Differential [[id number]]  (Abnormal) Collected:  11/16/19 0704    Specimen:  Blood Updated:  11/16/19 0729     WBC 4.97 10*3/mm3      RBC 4.17 10*6/mm3      Hemoglobin 8.1 g/dL      Hematocrit 29.9 %      MCV 71.7 fL      MCH 19.4 pg      MCHC 27.1 g/dL      RDW 20.2 %      RDW-SD 51.7 fl      MPV 9.9 fL      Platelets 376 10*3/mm3      Neutrophil % 79.3 %      Lymphocyte % 17.3 %      Monocyte % 2.4 %      Eosinophil % 0.0 %      Basophil % 0.2 %      Neutrophils, Absolute 3.94 10*3/mm3       Lymphocytes, Absolute 0.86 10*3/mm3      Monocytes, Absolute 0.12 10*3/mm3      Eosinophils, Absolute 0.00 10*3/mm3      Basophils, Absolute 0.01 10*3/mm3     Hemoglobin A1c [[id number]]  (Abnormal) Collected:  11/16/19 0704    Specimen:  Blood Updated:  11/16/19 0726     Hemoglobin A1C 6.00 %     Narrative:       Hemoglobin A1C Ranges:    Increased Risk for Diabetes  5.7% to 6.4%  Diabetes                     >= 6.5%  Diabetic Goal                < 7.0%    Blood Gas, Arterial [[id number]]  (Abnormal) Collected:  11/16/19 0349    Specimen:  Arterial Blood Updated:  11/16/19 0358     Site Right Radial     Dov's Test Positive     pH, Arterial 7.489 pH units      Comment: 83 Value above reference range        pCO2, Arterial 41.1 mm Hg      pO2, Arterial 56.8 mm Hg      Comment: 84 Value below reference range        HCO3, Arterial 31.2 mmol/L      Comment: 83 Value above reference range        Base Excess, Arterial 7.2 mmol/L      Comment: 83 Value above reference range        O2 Saturation, Arterial 90.9 %      Comment: 84 Value below reference range        Temperature 37.0 C      Barometric Pressure for Blood Gas 758 mmHg      Modality Ventilator     FIO2 40 %      Ventilator Mode AC     Set Tidal Volume 600     Set Mech Resp Rate 18.0     PEEP 5.0     Collected by [id number]     Comment: Meter: [id number]     :  [id number]       Blood Gas, Arterial [[id number]]  (Abnormal) Collected:  11/15/19 2043    Specimen:  Arterial Blood Updated:  11/15/19 2053     Site Right Brachial     Dov's Test N/A     pH, Arterial 7.291 pH units      Comment: 84 Value below reference range        pCO2, Arterial 66.8 mm Hg      Comment: 83 Value above reference range        pO2, Arterial 121.0 mm Hg      Comment: 83 Value above reference range        HCO3, Arterial 32.2 mmol/L      Comment: 83 Value above reference range        Base Excess, Arterial 4.7 mmol/L      Comment: 83 Value above reference range        O2 Saturation,  Arterial 98.5 %      Temperature 37.0 C      Barometric Pressure for Blood Gas 759 mmHg      Modality Ventilator     FIO2 60 %      Ventilator Mode AC     Set Tidal Volume 600     Set Mech Resp Rate 14.0     PEEP 5.0     Collected by [id number]     Comment: Meter: [id number]     :  [id number]       Blood Gas, Arterial [[id number]]  (Abnormal) Collected:  11/15/19 1630    Specimen:  Arterial Blood Updated:  11/15/19 1640     Site Right Radial     Dov's Test Positive     pH, Arterial 7.309 pH units      Comment: 84 Value below reference range        pCO2, Arterial 66.1 mm Hg      Comment: 83 Value above reference range        pO2, Arterial 76.8 mm Hg      Comment: 84 Value below reference range        HCO3, Arterial 33.2 mmol/L      Comment: 83 Value above reference range        Base Excess, Arterial 5.9 mmol/L      Comment: 83 Value above reference range        O2 Saturation, Arterial 94.4 %      Temperature 37.0 C      Barometric Pressure for Blood Gas 759 mmHg      Modality BiPap     FIO2 30 %      Ventilator Mode NA     Set [facility] Resp Rate 16.0     IPAP 20     Comment: Meter: [id number]     :  [id number]        EPAP 6     Collected by [id number]    Blood Gas, Arterial [[id number]]  (Abnormal) Collected:  11/15/19 1514    Specimen:  Arterial Blood Updated:  11/15/19 1518     Site Left Radial     Dov's Test Positive     pH, Arterial 7.321 pH units      Comment: 84 Value below reference range        pCO2, Arterial 64.5 mm Hg      Comment: 83 Value above reference range        pO2, Arterial 74.0 mm Hg      Comment: 84 Value below reference range        HCO3, Arterial 33.3 mmol/L      Comment: 83 Value above reference range        Base Excess, Arterial 6.2 mmol/L      Comment: 83 Value above reference range        O2 Saturation, Arterial 93.9 %      Comment: 84 Value below reference range        Temperature 37.0 C      Barometric Pressure for Blood Gas 759 mmHg      Modality Heated HFNC     FIO2 35 %       Flow Rate 40.0 lpm      Ventilator Mode NA     Collected by [id number]     Comment: Meter: [id number]     :  [id number]       [place] Draw [[id number]] Collected:  11/15/19 1355    Specimen:  Blood Updated:  11/15/19 1501    Narrative:       The following orders were created for panel order [place] Draw.  Procedure                               Abnormality         Status                     ---------                               -----------         ------                     Red Top[[id number]]                                          Final result                 Please view results for these tests on the individual orders.    Red Top [[id number]] Collected:  11/15/19 1355    Specimen:  Blood Updated:  11/15/19 1501     Extra Tube Hold for add-ons.     Comment: Auto resulted.       D-dimer, Quantitative [[id number]]  (Abnormal) Collected:  11/15/19 1355    Specimen:  Blood Updated:  11/15/19 1501     D-Dimer, Quantitative 1.81 mg/L (FEU)     Narrative:       Reference Range is 0-0.50 mg/L FEU. However, results <0.50 mg/L FEU tends to rule out DVT or PE. Results >0.50 mg/L FEU are not useful in predicting absence or presence of DVT or PE.    Manual Differential [[id number]]  (Abnormal) Collected:  11/15/19 1355    Specimen:  Blood Updated:  11/15/19 1443     Neutrophil % 72.7 %      Lymphocyte % 8.1 %      Monocyte % 4.0 %      Eosinophil % 12.1 %      Basophil % 2.0 %      Atypical Lymphocyte % 1.0 %      Neutrophils Absolute 4.78 10*3/mm3      Lymphocytes Absolute 0.53 10*3/mm3      Monocytes Absolute 0.26 10*3/mm3      Eosinophils Absolute 0.79 10*3/mm3      Basophils Absolute 0.13 10*3/mm3      nRBC 4.0 /100 WBC      Anisocytosis Slight/1+     Hypochromia Mod/2+     Microcytes Slight/1+     Poikilocytes Slight/1+     Polychromasia Slight/1+     WBC Morphology Normal     Giant Platelets Large/3+    CBC & Differential [[id number]] Collected:  11/15/19 1355    Specimen:  Blood Updated:  11/15/19 1443     Narrative:       The following orders were created for panel order CBC & Differential.  Procedure                               Abnormality         Status                     ---------                               -----------         ------                     CBC Auto Differential[[id number]]        Abnormal            Final result                 Please view results for these tests on the individual orders.    CBC Auto Differential [[id number]]  (Abnormal) Collected:  11/15/19 1355    Specimen:  Blood Updated:  11/15/19 1443     WBC 6.57 10*3/mm3      RBC 4.34 10*6/mm3      Hemoglobin 8.5 g/dL      Hematocrit 32.4 %      MCV 74.7 fL      MCH 19.6 pg      MCHC 26.2 g/dL      RDW 20.5 %      RDW-SD 54.5 fl      MPV 9.5 fL      Platelets 372 10*3/mm3     Comprehensive Metabolic Panel [[id number]]  (Abnormal) Collected:  11/15/19 1355    Specimen:  Blood Updated:  11/15/19 1439     Glucose 128 mg/dL      BUN 14 mg/dL      Creatinine 1.06 mg/dL      Sodium 144 mmol/L      Potassium 3.8 mmol/L      Chloride 103 mmol/L      CO2 33.0 mmol/L      Calcium 9.0 mg/dL      Total Protein 7.6 g/dL      Albumin 3.70 g/dL      ALT (SGPT) 17 U/L      AST (SGOT) 27 U/L      Alkaline Phosphatase 145 U/L      Total Bilirubin 0.6 mg/dL      eGFR Non African Amer 68 mL/min/1.73      Globulin 3.9 gm/dL      A/G Ratio 0.9 g/dL      BUN/Creatinine Ratio 13.2     Anion Gap 8.0 mmol/L     Narrative:       GFR Normal >60  Chronic Kidney Disease <60  Kidney Failure <15    BNP [[id number]]  (Normal) Collected:  11/15/19 1355    Specimen:  Blood Updated:  11/15/19 1435     proBNP 1,550.0 pg/mL     Narrative:       Among patients with dyspnea, NT-proBNP is highly sensitive for the detection of acute congestive heart failure. In addition NT-proBNP of <300 pg/ml effectively rules out acute congestive heart failure with 99% negative predictive value.    Troponin [[id number]]  (Normal) Collected:  11/15/19 1355    Specimen:  Blood Updated:  11/15/19 1435      Troponin T 0.012 ng/mL     Narrative:       Troponin T Reference Range:  <= 0.03 ng/mL-   Negative for AMI  >0.03 ng/mL-     Abnormal for myocardial necrosis.  Clinicians would have to utilize clinical acumen, EKG, Troponin and serial changes to determine if it is an Acute Myocardial Infarction or myocardial injury due to an underlying chronic condition.     Magnesium [[id number]]  (Normal) Collected:  11/15/19 1355    Specimen:  Blood Updated:  11/15/19 1435     Magnesium 2.0 mg/dL     Lactic Acid, Plasma [[id number]]  (Normal) Collected:  11/15/19 1355    Specimen:  Blood Updated:  11/15/19 1431     Lactate 1.1 mmol/L     Blood Culture - Blood, Arm, Left [[id number]] Collected:  11/15/19 1355    Specimen:  Blood from Arm, Left Updated:  11/15/19 1424    Blood Culture - Blood, Hand, Right [[id number]] Collected:  11/15/19 1359    Specimen:  Blood from Hand, Right Updated:  11/15/19 1424    Blood Gas, Arterial [[id number]]  (Abnormal) Collected:  11/15/19 1356    Specimen:  Arterial Blood Updated:  11/15/19 1401     Site Left Radial     Dov's Test Positive     pH, Arterial 7.355 pH units      pCO2, Arterial 57.5 mm Hg      Comment: 83 Value above reference range        pO2, Arterial 45.9 mm Hg      Comment: 85 Value below critical limit        HCO3, Arterial 32.1 mmol/L      Comment: 83 Value above reference range        Base Excess, Arterial 5.7 mmol/L      Comment: 83 Value above reference range        O2 Saturation, Arterial 80.0 %      Comment: 84 Value below reference range        Temperature 37.0 C      Barometric Pressure for Blood Gas 759 mmHg      Modality Room Air     Ventilator Mode NA     Notified Who DR YOU     Notified By [id number]     Notified Time 11/15/2019 14:01     Collected by [id number]     Comment: Meter: [id number]     :  [id number]             Imaging Results (Last 24 Hours)     Procedure Component Value Units Date/Time    XR Chest 1 View [[id number]] Collected:  11/15/19 2005      Updated:  11/15/19 2008    Narrative:       Frontal upright radiograph of the chest 11/15/2019 6:15 PM CST     COMPARISON: 11/15/2019 at 1:58 PM     HISTORY postintubation.     FINDINGS:   The lungs are clear. Cardiac silhouettes mildly enlarged. Endotracheal  tube is present satisfactorily position.      The osseous structures and surrounding soft tissues demonstrate no acute  abnormality.       Impression:       1. Mild cardiomegaly no evidence of pulmonary vascular congestion.        This report was finalized on 11/15/2019 20:05 by Dr. Kailash Crouch MD.    XR Chest 1 View [[id number]] Collected:  11/15/19 1413     Updated:  11/15/19 1417    Narrative:       Frontal upright radiograph of the chest 11/15/2019 2:09 PM CST     COMPARISON: 10/30/2019.     HISTORY: Short of breath.     FINDINGS:   Hyperinflation flattening diaphragms noted consistent with COPD.. The  cardiac silhouettes mildly enlarged but unchanged..      The osseous structures and surrounding soft tissues demonstrate no acute  abnormality.       Impression:       1. COPD no acute cardiopulmonary process.        This report was finalized on 11/15/2019 14:14 by Dr. Kailash Crouch MD.             Intake/Output Summary (Last 24 hours) at 11/16/2019 1006  Last data filed at 11/16/2019 0759  Gross per 24 hour   Intake 442 ml   Output 825 ml   Net -383 ml       Physical Exam   Constitutional: He appears well-developed and well-nourished. He has a sickly appearance. He is sedated and intubated.   HENT:   Head: Normocephalic and atraumatic.   Nose: Nose normal.   Mouth/Throat: Oropharynx is clear and moist.   Eyes: Conjunctivae are normal. No scleral icterus.   Neck: Neck supple. No JVD present.   Cardiovascular: Normal rate, regular rhythm, normal heart sounds and intact distal pulses.   Pulmonary/Chest: Breath sounds normal. He is intubated. He has no wheezes.   Abdominal: Soft. Bowel sounds are normal. He exhibits no mass.   Musculoskeletal: Normal range  of motion. He exhibits no edema or deformity.   Skin: Skin is warm and dry. No rash noted.       Results Review:  I have reviewed the labs, radiology results, and diagnostic studies since my last progress note and made treatment changes reflective of the results.   I have reviewed the current medications.    Assessment/Plan     Active Hospital Problems    Diagnosis   • **Acute respiratory failure with hypoxia and hypercapnia (CMS/HCC)   • Hypertension   • Anemia       PLAN:  CT angiogram of the chest today  Insert orogastric tube  Nutrition consultation for tube feeding recommendations  Daily chest x-ray starting tomorrow morning  Urine drug screen    Maximino Lamb DO   11/16/19   10:06 AM

## 2019-11-16 NOTE — ED NOTES
PT ASSISTED TO HOSPITAL BED VIA SHEET LIFT AND MULTIPLE STAFF MEMBERS.  PT PLACED IN LOW FOWLERS.  ORAL SECRETIONS SUCTIONED WITH YANKAUR AND INLINE ORAL SUCTIONING, CLEAR SECRETIONS ONLY.   THE BREATH SOUNDS CLEAR AND EQUAL BILATERAL WITH NO MOVEMENT IN ETT MEASUREMENT AT THE LIP.  PT REMAINS IN RESTRAINTS 2 POINT UPPER EXTREMITY WITH DISTAL PULSE MOTOR AND SENSORY PRESENT.  PT HAD NEW INCONTINENT PADS PLACED, AND PATIENTS PERINEUM WAS CLEANED.  PT COVERED WITH WARM BLANKET.      Atilio Rushing, RN  11/16/19 0571

## 2019-11-16 NOTE — ED NOTES
PT ATTEMPTING TO PULL ETT WITH BILAT UPPER EXTREMITIES.  PT DOES NOT FOLLOW COMMANDS AND DOES NOT MOVE LOWER EXTREMITIES.   PT LOW FOWLERS.  CLEAR SECRETIONS SUCTIONED WITH YANKAUER AND IN LINE ETT SUCTION     Atilio Rushing, RN  11/15/19 1937

## 2019-11-16 NOTE — NURSING NOTE
Called placed to Fadumo Price, friend, to gain information regarding next of kin for CT consent. Message left.

## 2019-11-17 ENCOUNTER — APPOINTMENT (OUTPATIENT)
Dept: GENERAL RADIOLOGY | Facility: HOSPITAL | Age: 79
End: 2019-11-17

## 2019-11-17 LAB
A-A DO2: 153.4 MMHG
ABO GROUP BLD: NORMAL
ANION GAP SERPL CALCULATED.3IONS-SCNC: 10 MMOL/L (ref 5–15)
ARTERIAL PATENCY WRIST A: ABNORMAL
ATMOSPHERIC PRESS: 754 MMHG
BASE EXCESS BLDA CALC-SCNC: 3.5 MMOL/L (ref 0–2)
BASOPHILS # BLD AUTO: 0 10*3/MM3 (ref 0–0.2)
BASOPHILS NFR BLD AUTO: 0 % (ref 0–1.5)
BDY SITE: ABNORMAL
BLD GP AB SCN SERPL QL: NEGATIVE
BODY TEMPERATURE: 37 C
BUN BLD-MCNC: 19 MG/DL (ref 8–23)
BUN/CREAT SERPL: 19.2 (ref 7–25)
CALCIUM SPEC-SCNC: 8.4 MG/DL (ref 8.6–10.5)
CHLORIDE SERPL-SCNC: 105 MMOL/L (ref 98–107)
CO2 SERPL-SCNC: 26 MMOL/L (ref 22–29)
COHGB MFR BLD: 1 % (ref 0–5)
CREAT BLD-MCNC: 0.99 MG/DL (ref 0.76–1.27)
DEPRECATED RDW RBC AUTO: 51.8 FL (ref 37–54)
EOSINOPHIL # BLD AUTO: 0 10*3/MM3 (ref 0–0.4)
EOSINOPHIL NFR BLD AUTO: 0 % (ref 0.3–6.2)
ERYTHROCYTE [DISTWIDTH] IN BLOOD BY AUTOMATED COUNT: 20.3 % (ref 12.3–15.4)
GFR SERPL CREATININE-BSD FRML MDRD: 73 ML/MIN/1.73
GLUCOSE BLD-MCNC: 199 MG/DL (ref 65–99)
HCO3 BLDA-SCNC: 29 MMOL/L (ref 20–26)
HCT VFR BLD AUTO: 27.6 % (ref 37.5–51)
HCT VFR BLD CALC: 23 % (ref 38–51)
HGB BLD-MCNC: 7.5 G/DL (ref 13–17.7)
HGB BLDA-MCNC: 7.5 G/DL (ref 14–18)
HOROWITZ INDEX BLD+IHG-RTO: 40 %
IMM GRANULOCYTES # BLD AUTO: 0.05 10*3/MM3 (ref 0–0.05)
IMM GRANULOCYTES NFR BLD AUTO: 1 % (ref 0–0.5)
LYMPHOCYTES # BLD AUTO: 0.45 10*3/MM3 (ref 0.7–3.1)
LYMPHOCYTES NFR BLD AUTO: 8.6 % (ref 19.6–45.3)
Lab: ABNORMAL
MCH RBC QN AUTO: 19.6 PG (ref 26.6–33)
MCHC RBC AUTO-ENTMCNC: 27.2 G/DL (ref 31.5–35.7)
MCV RBC AUTO: 72.1 FL (ref 79–97)
METHGB BLD QL: 1.3 % (ref 0–3)
MODALITY: ABNORMAL
MONOCYTES # BLD AUTO: 0.19 10*3/MM3 (ref 0.1–0.9)
MONOCYTES NFR BLD AUTO: 3.6 % (ref 5–12)
NEUTROPHILS # BLD AUTO: 4.53 10*3/MM3 (ref 1.7–7)
NEUTROPHILS NFR BLD AUTO: 86.8 % (ref 42.7–76)
NOTE: ABNORMAL
NRBC BLD AUTO-RTO: 2.7 /100 WBC (ref 0–0.2)
OXYHGB MFR BLDV: 91.7 % (ref 94–99)
PCO2 BLDA: 49 MM HG (ref 35–45)
PCO2 TEMP ADJ BLD: ABNORMAL MM[HG]
PEEP RESPIRATORY: 5 CM[H2O]
PH BLDA: 7.38 PH UNITS (ref 7.35–7.45)
PH, TEMP CORRECTED: ABNORMAL
PLATELET # BLD AUTO: 305 10*3/MM3 (ref 140–450)
PMV BLD AUTO: 10.1 FL (ref 6–12)
PO2 BLDA: 75.4 MM HG (ref 83–108)
PO2 TEMP ADJ BLD: ABNORMAL MM[HG]
POTASSIUM BLD-SCNC: 4.4 MMOL/L (ref 3.5–5.2)
POTASSIUM BLDA-SCNC: 4.2 MMOL/L (ref 3.5–5.2)
RBC # BLD AUTO: 3.83 10*6/MM3 (ref 4.14–5.8)
RH BLD: POSITIVE
SAO2 % BLDCOA: 93.9 % (ref 94–99)
SET MECH RESP RATE: 10
SODIUM BLD-SCNC: 141 MMOL/L (ref 136–145)
SODIUM BLDA-SCNC: 143 MMOL/L (ref 136–145)
T&S EXPIRATION DATE: NORMAL
VENTILATOR MODE: AC
VT ON VENT VENT: 600 ML
WBC NRBC COR # BLD: 5.22 10*3/MM3 (ref 3.4–10.8)

## 2019-11-17 PROCEDURE — 94770: CPT

## 2019-11-17 PROCEDURE — 94799 UNLISTED PULMONARY SVC/PX: CPT

## 2019-11-17 PROCEDURE — 25010000002 METHYLPREDNISOLONE PER 125 MG: Performed by: NURSE PRACTITIONER

## 2019-11-17 PROCEDURE — 86901 BLOOD TYPING SEROLOGIC RH(D): CPT | Performed by: FAMILY MEDICINE

## 2019-11-17 PROCEDURE — 25010000002 METHYLPREDNISOLONE PER 125 MG: Performed by: INTERNAL MEDICINE

## 2019-11-17 PROCEDURE — 82728 ASSAY OF FERRITIN: CPT | Performed by: INTERNAL MEDICINE

## 2019-11-17 PROCEDURE — 86850 RBC ANTIBODY SCREEN: CPT | Performed by: FAMILY MEDICINE

## 2019-11-17 PROCEDURE — 36600 WITHDRAWAL OF ARTERIAL BLOOD: CPT

## 2019-11-17 PROCEDURE — 25010000002 PROPOFOL 10 MG/ML EMULSION: Performed by: FAMILY MEDICINE

## 2019-11-17 PROCEDURE — 71045 X-RAY EXAM CHEST 1 VIEW: CPT

## 2019-11-17 PROCEDURE — 83050 HGB METHEMOGLOBIN QUAN: CPT

## 2019-11-17 PROCEDURE — 86900 BLOOD TYPING SEROLOGIC ABO: CPT | Performed by: FAMILY MEDICINE

## 2019-11-17 PROCEDURE — 84466 ASSAY OF TRANSFERRIN: CPT | Performed by: INTERNAL MEDICINE

## 2019-11-17 PROCEDURE — 36430 TRANSFUSION BLD/BLD COMPNT: CPT

## 2019-11-17 PROCEDURE — P9016 RBC LEUKOCYTES REDUCED: HCPCS

## 2019-11-17 PROCEDURE — 83540 ASSAY OF IRON: CPT | Performed by: INTERNAL MEDICINE

## 2019-11-17 PROCEDURE — 82805 BLOOD GASES W/O2 SATURATION: CPT

## 2019-11-17 PROCEDURE — 99233 SBSQ HOSP IP/OBS HIGH 50: CPT | Performed by: INTERNAL MEDICINE

## 2019-11-17 PROCEDURE — 85025 COMPLETE CBC W/AUTO DIFF WBC: CPT | Performed by: INTERNAL MEDICINE

## 2019-11-17 PROCEDURE — 94003 VENT MGMT INPAT SUBQ DAY: CPT

## 2019-11-17 PROCEDURE — 80048 BASIC METABOLIC PNL TOTAL CA: CPT | Performed by: INTERNAL MEDICINE

## 2019-11-17 PROCEDURE — 82375 ASSAY CARBOXYHB QUANT: CPT

## 2019-11-17 PROCEDURE — 86920 COMPATIBILITY TEST SPIN: CPT

## 2019-11-17 PROCEDURE — 86900 BLOOD TYPING SEROLOGIC ABO: CPT

## 2019-11-17 PROCEDURE — 25010000002 CEFTRIAXONE PER 250 MG: Performed by: NURSE PRACTITIONER

## 2019-11-17 PROCEDURE — 25010000002 ENOXAPARIN PER 10 MG: Performed by: NURSE PRACTITIONER

## 2019-11-17 PROCEDURE — 25010000002 MORPHINE PER 10 MG: Performed by: FAMILY MEDICINE

## 2019-11-17 RX ORDER — NICOTINE 21 MG/24HR
1 PATCH, TRANSDERMAL 24 HOURS TRANSDERMAL NIGHTLY
Status: DISCONTINUED | OUTPATIENT
Start: 2019-11-17 | End: 2019-11-22 | Stop reason: HOSPADM

## 2019-11-17 RX ORDER — NICOTINE 21 MG/24HR
1 PATCH, TRANSDERMAL 24 HOURS TRANSDERMAL
Status: DISCONTINUED | OUTPATIENT
Start: 2019-11-18 | End: 2019-11-17

## 2019-11-17 RX ORDER — METHYLPREDNISOLONE SODIUM SUCCINATE 40 MG/ML
40 INJECTION, POWDER, LYOPHILIZED, FOR SOLUTION INTRAMUSCULAR; INTRAVENOUS EVERY 8 HOURS
Status: DISCONTINUED | OUTPATIENT
Start: 2019-11-17 | End: 2019-11-18

## 2019-11-17 RX ADMIN — NICOTINE 1 PATCH: 21 PATCH, EXTENDED RELEASE TRANSDERMAL at 22:25

## 2019-11-17 RX ADMIN — FAMOTIDINE 20 MG: 10 INJECTION, SOLUTION INTRAVENOUS at 08:25

## 2019-11-17 RX ADMIN — METHYLPREDNISOLONE SODIUM SUCCINATE 60 MG: 125 INJECTION, POWDER, FOR SOLUTION INTRAMUSCULAR; INTRAVENOUS at 08:31

## 2019-11-17 RX ADMIN — SODIUM CHLORIDE 100 ML/HR: 9 INJECTION, SOLUTION INTRAVENOUS at 04:28

## 2019-11-17 RX ADMIN — IPRATROPIUM BROMIDE AND ALBUTEROL SULFATE 3 ML: 2.5; .5 SOLUTION RESPIRATORY (INHALATION) at 12:20

## 2019-11-17 RX ADMIN — IPRATROPIUM BROMIDE AND ALBUTEROL SULFATE 3 ML: 2.5; .5 SOLUTION RESPIRATORY (INHALATION) at 00:26

## 2019-11-17 RX ADMIN — ENOXAPARIN SODIUM 40 MG: 40 INJECTION SUBCUTANEOUS at 17:22

## 2019-11-17 RX ADMIN — IPRATROPIUM BROMIDE AND ALBUTEROL SULFATE 3 ML: 2.5; .5 SOLUTION RESPIRATORY (INHALATION) at 19:42

## 2019-11-17 RX ADMIN — FAMOTIDINE 20 MG: 10 INJECTION, SOLUTION INTRAVENOUS at 21:17

## 2019-11-17 RX ADMIN — IPRATROPIUM BROMIDE AND ALBUTEROL SULFATE 3 ML: 2.5; .5 SOLUTION RESPIRATORY (INHALATION) at 06:54

## 2019-11-17 RX ADMIN — SODIUM CHLORIDE 100 ML/HR: 9 INJECTION, SOLUTION INTRAVENOUS at 14:54

## 2019-11-17 RX ADMIN — METHYLPREDNISOLONE SODIUM SUCCINATE 60 MG: 125 INJECTION, POWDER, FOR SOLUTION INTRAMUSCULAR; INTRAVENOUS at 02:43

## 2019-11-17 RX ADMIN — PROPOFOL 35 MCG/KG/MIN: 10 INJECTION, EMULSION INTRAVENOUS at 07:59

## 2019-11-17 RX ADMIN — CEFTRIAXONE SODIUM 1 G: 1 INJECTION, POWDER, FOR SOLUTION INTRAMUSCULAR; INTRAVENOUS at 08:24

## 2019-11-17 RX ADMIN — PROPOFOL 40 MCG/KG/MIN: 10 INJECTION, EMULSION INTRAVENOUS at 03:51

## 2019-11-17 RX ADMIN — MORPHINE SULFATE 1 MG: 2 INJECTION, SOLUTION INTRAMUSCULAR; INTRAVENOUS at 00:31

## 2019-11-17 RX ADMIN — SODIUM CHLORIDE, PRESERVATIVE FREE 10 ML: 5 INJECTION INTRAVENOUS at 08:32

## 2019-11-17 RX ADMIN — METHYLPREDNISOLONE SODIUM SUCCINATE 40 MG: 125 INJECTION, POWDER, FOR SOLUTION INTRAMUSCULAR; INTRAVENOUS at 17:22

## 2019-11-17 RX ADMIN — SODIUM CHLORIDE, PRESERVATIVE FREE 10 ML: 5 INJECTION INTRAVENOUS at 21:18

## 2019-11-17 NOTE — PROGRESS NOTES
[facility] Medicine Services  INPATIENT PROGRESS NOTE    Length of Stay: 2  Date of Admission: 11/15/2019  Primary Care Physician: Delvin Caldera MD    Subjective     Chief Complaint:     Intubated and sedated on the ventilator    HPI     Patient remains intubated and sedated on ventilator.  The patient's sister and daughter are present are able to provide some history.  Apparently the patient has lived in estrangement from his family for many years.  His previous occupation was that of a  for 35 years.  He smoked heavily throughout his career as a  and his sister relates that he has smoked as much as 5 to 6 packs of cigarettes daily the patient remains a heavy smoker.  She reveals that he lives a hermit-like existence and is a hoarder.  The patient rarely has contact with family members and the patient's daughter states that it has been at least 2 years since they have had any contact.  ABGs this a.m. show a pH of 7.38, PCO2 49, PO2 75.4 on 40% FiO2.  Glucose was 199 but BMP was otherwise unremarkable.  CBC shows a normal white blood cell count, hemoglobin 7.5.  Spontaneous breathing trials are planned for today.     Review of Systems     All pertinent negatives and positives are as above. All other systems have been reviewed and are negative unless otherwise stated.     Objective    Temp:  [97.7 °F (36.5 °C)-98.3 °F (36.8 °C)] 97.8 °F (36.6 °C)  Heart Rate:  [66-88] 68  Resp:  [10-20] 10  BP: (102-141)/() 109/66  FiO2 (%):  [40 %] 40 %    Lab Results (last 24 hours)     Procedure Component Value Units Date/Time    Respiratory Culture - Sputum, Bronchus [[id number]] Collected:  11/16/19 1604    Specimen:  Sputum from Bronchus Updated:  11/17/19 0610     Gram Stain Greater than 25 WBCs per low power field      Moderate (3+) Epithelial cells per low power field      Moderate (3+) Mixed gram positive richardson      Few (2+) Gram negative bacilli    Basic  Metabolic Panel [[id number]]  (Abnormal) Collected:  11/17/19 0241    Specimen:  Blood Updated:  11/17/19 0307     Glucose 199 mg/dL      BUN 19 mg/dL      Creatinine 0.99 mg/dL      Sodium 141 mmol/L      Potassium 4.4 mmol/L      Chloride 105 mmol/L      CO2 26.0 mmol/L      Calcium 8.4 mg/dL      eGFR Non African Amer 73 mL/min/1.73      BUN/Creatinine Ratio 19.2     Anion Gap 10.0 mmol/L     Narrative:       GFR Normal >60  Chronic Kidney Disease <60  Kidney Failure <15    CBC & Differential [[id number]] Collected:  11/17/19 0241    Specimen:  Blood Updated:  11/17/19 0303    Narrative:       The following orders were created for panel order CBC & Differential.  Procedure                               Abnormality         Status                     ---------                               -----------         ------                     CBC Auto Differential[[id number]]        Abnormal            Final result                 Please view results for these tests on the individual orders.    CBC Auto Differential [[id number]]  (Abnormal) Collected:  11/17/19 0241    Specimen:  Blood Updated:  11/17/19 0303     WBC 5.22 10*3/mm3      RBC 3.83 10*6/mm3      Hemoglobin 7.5 g/dL      Hematocrit 27.6 %      MCV 72.1 fL      MCH 19.6 pg      MCHC 27.2 g/dL      RDW 20.3 %      RDW-SD 51.8 fl      MPV 10.1 fL      Platelets 305 10*3/mm3      Neutrophil % 86.8 %      Lymphocyte % 8.6 %      Monocyte % 3.6 %      Eosinophil % 0.0 %      Basophil % 0.0 %      Immature Grans % 1.0 %      Neutrophils, Absolute 4.53 10*3/mm3      Lymphocytes, Absolute 0.45 10*3/mm3      Monocytes, Absolute 0.19 10*3/mm3      Eosinophils, Absolute 0.00 10*3/mm3      Basophils, Absolute 0.00 10*3/mm3      Immature Grans, Absolute 0.05 10*3/mm3      nRBC 2.7 /100 WBC     Blood Gas, Arterial With Co-Ox [[id number]]  (Abnormal) Collected:  11/17/19 0257    Specimen:  Arterial Blood Updated:  11/17/19 0302     Site Right Brachial     Dov's Test N/A     pH,  Arterial 7.381 pH units      pCO2, Arterial 49.0 mm Hg      Comment: 83 Value above reference range        pO2, Arterial 75.4 mm Hg      Comment: 84 Value below reference range        HCO3, Arterial 29.0 mmol/L      Comment: 83 Value above reference range        Base Excess, Arterial 3.5 mmol/L      Comment: 83 Value above reference range        O2 Saturation, Arterial 93.9 %      Comment: 84 Value below reference range        Hemoglobin, Blood Gas 7.5 g/dL      Comment: 84 Value below reference range        Hematocrit, Blood Gas 23.0 %      Comment: 84 Value below reference range        Oxyhemoglobin 91.7 %      Comment: 84 Value below reference range        Methemoglobin 1.30 %      Carboxyhemoglobin 1.0 %      A-a Gradiant 153.4 mmHg      Temperature 37.0 C      Sodium, Arterial 143 mmol/L      Potassium, Arterial 4.2 mmol/L      Barometric Pressure for Blood Gas 754 mmHg      Modality Ventilator     FIO2 40 %      Ventilator Mode AC     Set Tidal Volume 600     Set Mech Resp Rate 10.0     PEEP 5.0     Note --     Collected by [id number]     Comment: Meter: [id number]     :  [id number]        pH, Temp Corrected --     pCO2, Temperature Corrected --     pO2, Temperature Corrected --    Blood Culture - Blood, Arm, Left [[id number]] Collected:  11/15/19 1355    Specimen:  Blood from Arm, Left Updated:  11/16/19 1431     Blood Culture No growth at 24 hours    Blood Culture - Blood, Hand, Right [[id number]] Collected:  11/15/19 1359    Specimen:  Blood from Hand, Right Updated:  11/16/19 1431     Blood Culture No growth at 24 hours          Imaging Results (Last 24 Hours)     Procedure Component Value Units Date/Time    XR Chest 1 View [[id number]] Collected:  11/17/19 0932     Updated:  11/17/19 0936    Narrative:       EXAMINATION:  XR CHEST 1 VW-  11/17/2019 3:20 AM CST     HISTORY: on ventilator; J96.01-Acute respiratory failure with hypoxia;  J96.02-Acute respiratory failure with hypercapnia     COMPARISON:  11/15/2019.     FINDINGS:  There is a new NG tube extending to the stomach. The  endotracheal tube tip is at the T4-5 level. There is no dense  infiltrate. There is mild bronchial wall thickening. Heart size is upper  limits of normal.       Impression:       1. No focal infiltrate.  2. Mild bronchial wall thickening, stable.        This report was finalized on 11/17/2019 09:33 by Dr. Elias Pennington MD.    CT Angiogram Chest With & Without Contrast [[id number]] Collected:  11/16/19 1735     Updated:  11/16/19 1742    Narrative:       EXAMINATION:   CT ANGIOGRAM CHEST W WO CONTRAST-  11/16/2019 5:35 PM CST     HISTORY: CT chest angiogram dated 11/16/2019 5:09 PM CST      HISTORY: PE suspected     COMPARISON: None.      DLP: 560 mGy cm     TECHNIQUE: Helical tomographic images of the chest were obtained after  the administration of intravenous contrast following angiogram protocol.  Additionally, 3D MIP reconstructions in the coronal and sagittal planes  were provided.        FINDINGS:    The imaged portion of the base of the neck appears unremarkable.      There is adequate enhancement of the pulmonary arteries to evaluate for  central and segmental pulmonary emboli. There are no filling defects  within the main, lobar, segmental or visualized subsegmental pulmonary  arteries. The pulmonary vessels are within normal limits.      Small bilateral posterior pleural effusions are present. A 7 mm nodule  is present right upper lung on image 44. Left basilar atelectasis is  noted.. The trachea and bronchial tree are patent.      The heart and great vessels appear unremarkable. There is no pericardial  effusion.      No enlarged axillary or mediastinal lymph nodes are present.      The osseous structures of the thorax and surrounding soft tissues  demonstrate no acute process.      Nasogastric tube is present as a feeding tube and is satisfactorily  positioned.        Impression:       1. No evidence of embolic disease.         2. Infiltrate or atelectasis left lower lobe.        3. Small bilateral pleural effusions are noted.  4. 7 mm nodule left upper lobe. 6 month follow-up is suggested.        This report was finalized on 11/16/2019 17:39 by Dr. Kailash Crouch MD.    XR Abdomen KUB [[id number]] Collected:  11/16/19 1137     Updated:  11/16/19 1142    Narrative:       EXAMINATION:  XR ABDOMEN KUB-  11/16/2019 11:06 AM CST     HISTORY: OG tube placement.     COMPARISON: No comparison study.     TECHNIQUE: Supine image of the chest and upper abdomen.      FINDINGS:   A gastric tube extends to the stomach. The side-port and tip  are in the stomach. The side-port is just below the gastroesophageal  junction. The tube could be advanced distally about 5 cm.       Impression:       OG tube placement, as described.        This report was finalized on 11/16/2019 11:39 by Dr. Elias Pennington MD.             Intake/Output Summary (Last 24 hours) at 11/17/2019 1126  Last data filed at 11/17/2019 0800  Gross per 24 hour   Intake 3279.66 ml   Output 1000 ml   Net 2279.66 ml       Physical Exam    Constitutional: He appears well-developed and well-nourished. He has a sickly appearance. He is sedated and intubated.   HENT:   Head: Normocephalic and atraumatic.   Nose: Nose normal.   Mouth/Throat: Oropharynx is clear and moist.   Eyes: Conjunctivae are normal. No scleral icterus.   Neck: Neck supple. No JVD present.   Cardiovascular: Normal rate, regular rhythm, normal heart sounds and intact distal pulses.   Pulmonary/Chest: Breath sounds normal. He is intubated. He has no wheezes.  Occasional rhonchi noted.  Abdominal: Soft. Bowel sounds are normal. He exhibits no mass.   Musculoskeletal: Normal range of motion. He exhibits no edema or deformity.   Skin: Skin is warm and dry. No rash noted.     Results Review:  I have reviewed the labs, radiology results, and diagnostic studies since my last progress note and made treatment changes reflective of the  results.   I have reviewed the current medications.    Assessment/Plan     Active Hospital Problems    Diagnosis   • **Acute respiratory failure with hypoxia and hypercapnia (CMS/HCC)   • Hypertension   • Anemia       PLAN:  Wean steroids  Agree with spontaneous breathing trials today  Continue empiric broad-spectrum antibiotics  Continue DuoNeb treatments    Maximino Lamb DO   11/17/19   11:26 AM

## 2019-11-17 NOTE — PLAN OF CARE
Problem: Restraint, Nonbehavioral (Nonviolent)  Goal: Nonbehavioral (Nonviolent) Restraint: Absence of Injury/Harm  Outcome: Ongoing (interventions implemented as appropriate)    Goal: Nonbehavioral (Nonviolent) Restraint: Achievement of Discontinuation Criteria  Outcome: Ongoing (interventions implemented as appropriate)    Goal: Nonbehavioral (Nonviolent) Restraint: Preservation of Dignity and Wellbeing  Outcome: Ongoing (interventions implemented as appropriate)      Problem: Skin Injury Risk (Adult)  Goal: Identify Related Risk Factors and Signs and Symptoms  Outcome: Ongoing (interventions implemented as appropriate)    Goal: Skin Health and Integrity  Outcome: Ongoing (interventions implemented as appropriate)      Problem: Fall Risk (Adult)  Goal: Absence of Fall  Outcome: Ongoing (interventions implemented as appropriate)

## 2019-11-17 NOTE — PROGRESS NOTES
PULMONARY AND CRITICAL CARE PROGRESS NOTE - [facility]    Patient: Rodrigo Javed    1940    MR# [id number]    Acct# [id number]  11/17/19   9:43 AM  Referring Provider: Maximino Lamb DO    Chief Complaint: Mechanically ventilated    Interval history: He remains on the ventilator.  We have gotten additional history that he has smoked up to 5 to perhaps 6 packs of cigarettes per day.  He is sedated and unable to provide any history today.  Nursing reports no additional problems overnight.  He remains on steroids at a high dose and sedation.  Saturations have trended in the 91-96 range.  Has a peripheral IV, endotracheal tube Waltres catheter in place.    Meds:    cefTRIAXone 1 g Intravenous Daily   enoxaparin 40 mg Subcutaneous Q24H   famotidine 20 mg Intravenous Q12H   ipratropium-albuterol 3 mL Nebulization Q6H - RT   LORazepam 1 mg Intravenous Once   methylPREDNISolone sodium succinate 60 mg Intravenous Q6H   sodium chloride 10 mL Intravenous Q12H       propofol 5-50 mcg/kg/min Last Rate: 35 mcg/kg/min (11/17/19 0759)   sodium chloride 100 mL/hr Last Rate: 100 mL/hr (11/17/19 0428)     Review of Systems:   Cannot obtain due to mechanical ventilation.  The patient notably is critically ill and connected to a ventilator.  As such patient cannot communicate and provide any history whatsoever, including any history of present illness or interval history since arrival or review of systems. The interested reviewer may note this fact, as an attempt has been made at collecting and documenting these portions of the patient history, but this information is unobtainable despite attempted review and therefore cannot be documented at this time.   Ventilator Settings:     Vt (Set, L): 0.6 L  Resp Rate (Set): 10     FiO2 (%): 40 %  PEEP/CPAP (cm H2O): 5 cm H20  Minute Ventilation (L/min) (Obs): 8.6 L/min  Resp Rate (Observed) Vent: 14  I:E Ratio (Set): 1:3.20  I:E Ratio (Obs): 1:4  PIP Observed (cm  H2O): 23 cm H2O     Physical Exam:  Temp:  [97.7 °F (36.5 °C)-98.3 °F (36.8 °C)] 97.8 °F (36.6 °C)  Heart Rate:  [66-89] 71  Resp:  [10-20] 10  BP: (103-156)/() 106/66  FiO2 (%):  [40 %] 40 %    Intake/Output Summary (Last 24 hours) at 11/17/2019 0943  Last data filed at 11/17/2019 0800  Gross per 24 hour   Intake 3279.66 ml   Output 1000 ml   Net 2279.66 ml     SpO2 Percentage    11/17/19 0700 11/17/19 0730 11/17/19 0800   SpO2: 100% 95% 95%      Physical Exam   Constitutional: He appears well-developed and well-nourished. He does not have a sickly appearance. He does not appear ill. No distress. He is sedated and intubated.   Eyes: No scleral icterus.   Cardiovascular: Normal rate and regular rhythm.   No murmur heard.  Pulmonary/Chest: Effort normal. No accessory muscle usage. He is intubated. No respiratory distress. He has decreased breath sounds. He has wheezes. He has no rhonchi.   Neurological: He is unresponsive.   Skin: He is not diaphoretic.   Psychiatric: His mood appears not anxious. He is not agitated.     Results from last 7 days   Lab Units 11/17/19  0241 11/16/19  0704 11/15/19  1355   WBC 10*3/mm3 5.22 4.97 6.57   HEMOGLOBIN g/dL 7.5* 8.1* 8.5*   PLATELETS 10*3/mm3 305 376 372     Results from last 7 days   Lab Units 11/17/19  0257 11/17/19  0241 11/16/19  0704 11/15/19  1355   SODIUM mmol/L  --  141 142 144   SODIUM, ARTERIAL mmol/L 143  --   --   --    POTASSIUM mmol/L  --  4.4 4.3 3.8   BUN mg/dL  --  19 16 14   CREATININE mg/dL  --  0.99 1.03 1.06     Results from last 7 days   Lab Units 11/17/19  0257 11/16/19  0349 11/15/19  2043   PH, ARTERIAL pH units 7.381 7.489* 7.291*   PCO2, ARTERIAL mm Hg 49.0* 41.1 66.8*   PO2 ART mm Hg 75.4* 56.8* 121.0*   FIO2 % 40 40 60     Blood Culture   Date Value Ref Range Status   11/15/2019 No growth at 24 hours  Preliminary   11/15/2019 No growth at 24 hours  Preliminary     Sputum Gram stain gram-positive and gram-negative organisms    Recent  films:  Ct Angiogram Chest With & Without Contrast    Result Date: 11/16/2019  1. No evidence of embolic disease.   2. Infiltrate or atelectasis left lower lobe.   3. Small bilateral pleural effusions are noted. 4. 7 mm nodule left upper lobe. 6 month follow-up is suggested.   This report was finalized on 11/16/2019 17:39 by Dr. Kailash Crouch MD.    Xr Chest 1 View    Result Date: 11/17/2019  1. No focal infiltrate. 2. Mild bronchial wall thickening, stable.   This report was finalized on 11/17/2019 09:33 by Dr. Elias Pennington MD.    Xr Chest 1 View    Result Date: 11/15/2019  1. Mild cardiomegaly no evidence of pulmonary vascular congestion.   This report was finalized on 11/15/2019 20:05 by Dr. Kailash Crouch MD.    Xr Chest 1 View    Result Date: 11/15/2019  1. COPD no acute cardiopulmonary process.   This report was finalized on 11/15/2019 14:14 by Dr. Kailash Crouch MD.    Xr Abdomen Kub    Result Date: 11/16/2019  OG tube placement, as described.   This report was finalized on 11/16/2019 11:39 by Dr. Elias Pennington MD.    Films reviewed personally by me.  My interpretation: No acute infiltrates endotracheal tube in good position.  Pulmonary Assessment:  1. Severe acute respiratory failure with hypercapnia, improved, hypoxia compensated  2. Personal history of tobacco abuse  3. Possible substantial COPD  4. Probable chronic respiratory acidosis    Recommend:   · Begin spontaneous breathing trials today  · Reduce IV steroids   · Continue bronchodilators and antibiotics, empiric    Electronically signed by Waylon Ramey MD on 11/17/2019 at 9:43 AM

## 2019-11-18 ENCOUNTER — APPOINTMENT (OUTPATIENT)
Dept: GENERAL RADIOLOGY | Facility: HOSPITAL | Age: 79
End: 2019-11-18

## 2019-11-18 PROBLEM — I51.89 DIASTOLIC DYSFUNCTION: Status: ACTIVE | Noted: 2019-11-18

## 2019-11-18 PROBLEM — D50.9 MICROCYTIC ANEMIA: Status: ACTIVE | Noted: 2019-11-15

## 2019-11-18 PROBLEM — K59.09 CHRONIC CONSTIPATION: Status: ACTIVE | Noted: 2019-11-18

## 2019-11-18 PROBLEM — J44.1 COPD WITH ACUTE EXACERBATION (HCC): Status: ACTIVE | Noted: 2019-11-18

## 2019-11-18 PROBLEM — IMO0001 ALCOHOLISM /ALCOHOL ABUSE: Status: ACTIVE | Noted: 2019-11-18

## 2019-11-18 PROBLEM — R91.1 PULMONARY NODULE: Status: ACTIVE | Noted: 2019-11-18

## 2019-11-18 PROBLEM — R73.9 HYPERGLYCEMIA: Status: ACTIVE | Noted: 2019-11-18

## 2019-11-18 LAB
ABO + RH BLD: NORMAL
BASOPHILS # BLD AUTO: 0.01 10*3/MM3 (ref 0–0.2)
BASOPHILS NFR BLD AUTO: 0.1 % (ref 0–1.5)
BH BB BLOOD EXPIRATION DATE: NORMAL
BH BB BLOOD TYPE BARCODE: 5100
BH BB DISPENSE STATUS: NORMAL
BH BB PRODUCT CODE: NORMAL
BH BB UNIT NUMBER: NORMAL
CROSSMATCH INTERPRETATION: NORMAL
DEPRECATED RDW RBC AUTO: 53.1 FL (ref 37–54)
EOSINOPHIL # BLD AUTO: 0 10*3/MM3 (ref 0–0.4)
EOSINOPHIL NFR BLD AUTO: 0 % (ref 0.3–6.2)
ERYTHROCYTE [DISTWIDTH] IN BLOOD BY AUTOMATED COUNT: 20 % (ref 12.3–15.4)
FERRITIN SERPL-MCNC: 5.87 NG/ML (ref 30–400)
HCT VFR BLD AUTO: 31 % (ref 37.5–51)
HGB BLD-MCNC: 8.4 G/DL (ref 13–17.7)
IRON 24H UR-MRATE: 11 MCG/DL (ref 59–158)
IRON SATN MFR SERPL: 3 % (ref 20–50)
LYMPHOCYTES # BLD AUTO: 0.33 10*3/MM3 (ref 0.7–3.1)
LYMPHOCYTES NFR BLD AUTO: 4 % (ref 19.6–45.3)
MCH RBC QN AUTO: 20 PG (ref 26.6–33)
MCHC RBC AUTO-ENTMCNC: 27.1 G/DL (ref 31.5–35.7)
MCV RBC AUTO: 74 FL (ref 79–97)
MONOCYTES # BLD AUTO: 0.25 10*3/MM3 (ref 0.1–0.9)
MONOCYTES NFR BLD AUTO: 3 % (ref 5–12)
NEUTROPHILS # BLD AUTO: 7.63 10*3/MM3 (ref 1.7–7)
NEUTROPHILS NFR BLD AUTO: 91.3 % (ref 42.7–76)
PLATELET # BLD AUTO: 333 10*3/MM3 (ref 140–450)
PMV BLD AUTO: 9.4 FL (ref 6–12)
RBC # BLD AUTO: 4.19 10*6/MM3 (ref 4.14–5.8)
TIBC SERPL-MCNC: 437 MCG/DL (ref 298–536)
TRANSFERRIN SERPL-MCNC: 293 MG/DL (ref 200–360)
UNIT  ABO: NORMAL
UNIT  RH: NORMAL
WBC NRBC COR # BLD: 8.35 10*3/MM3 (ref 3.4–10.8)

## 2019-11-18 PROCEDURE — 99232 SBSQ HOSP IP/OBS MODERATE 35: CPT | Performed by: INTERNAL MEDICINE

## 2019-11-18 PROCEDURE — 25010000002 METHYLPREDNISOLONE PER 40 MG: Performed by: INTERNAL MEDICINE

## 2019-11-18 PROCEDURE — 25010000002 MORPHINE PER 10 MG: Performed by: FAMILY MEDICINE

## 2019-11-18 PROCEDURE — 25010000002 ENOXAPARIN PER 10 MG: Performed by: NURSE PRACTITIONER

## 2019-11-18 PROCEDURE — 94799 UNLISTED PULMONARY SVC/PX: CPT

## 2019-11-18 PROCEDURE — 85025 COMPLETE CBC W/AUTO DIFF WBC: CPT | Performed by: INTERNAL MEDICINE

## 2019-11-18 PROCEDURE — 25010000002 CEFTRIAXONE PER 250 MG: Performed by: NURSE PRACTITIONER

## 2019-11-18 PROCEDURE — 71045 X-RAY EXAM CHEST 1 VIEW: CPT

## 2019-11-18 PROCEDURE — 25010000002 METHYLPREDNISOLONE PER 40 MG: Performed by: NURSE PRACTITIONER

## 2019-11-18 PROCEDURE — 99406 BEHAV CHNG SMOKING 3-10 MIN: CPT

## 2019-11-18 PROCEDURE — 25010000002 ONDANSETRON PER 1 MG: Performed by: NURSE PRACTITIONER

## 2019-11-18 RX ORDER — METHYLPREDNISOLONE SODIUM SUCCINATE 40 MG/ML
40 INJECTION, POWDER, LYOPHILIZED, FOR SOLUTION INTRAMUSCULAR; INTRAVENOUS EVERY 12 HOURS
Status: DISCONTINUED | OUTPATIENT
Start: 2019-11-18 | End: 2019-11-19

## 2019-11-18 RX ORDER — BUDESONIDE AND FORMOTEROL FUMARATE DIHYDRATE 160; 4.5 UG/1; UG/1
2 AEROSOL RESPIRATORY (INHALATION)
Status: DISCONTINUED | OUTPATIENT
Start: 2019-11-18 | End: 2019-11-22 | Stop reason: HOSPADM

## 2019-11-18 RX ORDER — FAMOTIDINE 20 MG/1
20 TABLET, FILM COATED ORAL
Status: DISCONTINUED | OUTPATIENT
Start: 2019-11-18 | End: 2019-11-18

## 2019-11-18 RX ORDER — BISACODYL 5 MG/1
10 TABLET, DELAYED RELEASE ORAL DAILY PRN
Status: DISCONTINUED | OUTPATIENT
Start: 2019-11-18 | End: 2019-11-22 | Stop reason: HOSPADM

## 2019-11-18 RX ORDER — LISINOPRIL 5 MG/1
5 TABLET ORAL
Status: DISCONTINUED | OUTPATIENT
Start: 2019-11-18 | End: 2019-11-19

## 2019-11-18 RX ORDER — ACETAMINOPHEN 325 MG/1
650 TABLET ORAL EVERY 6 HOURS PRN
Status: DISCONTINUED | OUTPATIENT
Start: 2019-11-18 | End: 2019-11-22 | Stop reason: HOSPADM

## 2019-11-18 RX ORDER — GUAIFENESIN 600 MG/1
1200 TABLET, EXTENDED RELEASE ORAL EVERY 12 HOURS SCHEDULED
Status: DISCONTINUED | OUTPATIENT
Start: 2019-11-18 | End: 2019-11-22 | Stop reason: HOSPADM

## 2019-11-18 RX ADMIN — SODIUM CHLORIDE 100 ML/HR: 9 INJECTION, SOLUTION INTRAVENOUS at 00:06

## 2019-11-18 RX ADMIN — IPRATROPIUM BROMIDE AND ALBUTEROL SULFATE 3 ML: 2.5; .5 SOLUTION RESPIRATORY (INHALATION) at 06:24

## 2019-11-18 RX ADMIN — ENOXAPARIN SODIUM 40 MG: 40 INJECTION SUBCUTANEOUS at 20:48

## 2019-11-18 RX ADMIN — LISINOPRIL 5 MG: 5 TABLET ORAL at 09:39

## 2019-11-18 RX ADMIN — GUAIFENESIN 1200 MG: 600 TABLET, EXTENDED RELEASE ORAL at 09:09

## 2019-11-18 RX ADMIN — IPRATROPIUM BROMIDE AND ALBUTEROL SULFATE 3 ML: 2.5; .5 SOLUTION RESPIRATORY (INHALATION) at 00:43

## 2019-11-18 RX ADMIN — SODIUM CHLORIDE, PRESERVATIVE FREE 10 ML: 5 INJECTION INTRAVENOUS at 09:14

## 2019-11-18 RX ADMIN — MORPHINE SULFATE 1 MG: 2 INJECTION, SOLUTION INTRAMUSCULAR; INTRAVENOUS at 03:43

## 2019-11-18 RX ADMIN — IPRATROPIUM BROMIDE AND ALBUTEROL SULFATE 3 ML: 2.5; .5 SOLUTION RESPIRATORY (INHALATION) at 20:12

## 2019-11-18 RX ADMIN — POLYETHYLENE GLYCOL 3350 17 G: 17 POWDER, FOR SOLUTION ORAL at 09:39

## 2019-11-18 RX ADMIN — METHYLPREDNISOLONE SODIUM SUCCINATE 40 MG: 40 INJECTION, POWDER, FOR SOLUTION INTRAMUSCULAR; INTRAVENOUS at 20:48

## 2019-11-18 RX ADMIN — ONDANSETRON 4 MG: 2 INJECTION INTRAMUSCULAR; INTRAVENOUS at 03:44

## 2019-11-18 RX ADMIN — NICOTINE 1 PATCH: 21 PATCH, EXTENDED RELEASE TRANSDERMAL at 20:48

## 2019-11-18 RX ADMIN — FAMOTIDINE 20 MG: 20 TABLET, FILM COATED ORAL at 09:09

## 2019-11-18 RX ADMIN — IPRATROPIUM BROMIDE AND ALBUTEROL SULFATE 3 ML: 2.5; .5 SOLUTION RESPIRATORY (INHALATION) at 14:13

## 2019-11-18 RX ADMIN — GUAIFENESIN 1200 MG: 600 TABLET, EXTENDED RELEASE ORAL at 20:48

## 2019-11-18 RX ADMIN — METHYLPREDNISOLONE SODIUM SUCCINATE 40 MG: 125 INJECTION, POWDER, FOR SOLUTION INTRAMUSCULAR; INTRAVENOUS at 00:06

## 2019-11-18 RX ADMIN — SODIUM CHLORIDE, PRESERVATIVE FREE 10 ML: 5 INJECTION INTRAVENOUS at 20:49

## 2019-11-18 RX ADMIN — CEFTRIAXONE SODIUM 1 G: 1 INJECTION, POWDER, FOR SOLUTION INTRAMUSCULAR; INTRAVENOUS at 09:09

## 2019-11-18 NOTE — PROGRESS NOTES
PULMONARY AND CRITICAL CARE PROGRESS NOTE - [facility]    Patient: Rodrigo Javed    1940    MR# [id number]    Acct# [id number]  11/18/19   8:55 AM  Referring Provider: Delvin Lopez DO    Chief Complaint: Mechanically ventilated    Interval history: He has done well off of the vent. He is on 3L NC with a saturation of 93%. He states when he smokes more, he gets more short of breath. He states he feels weak.     Meds:    budesonide-formoterol 2 puff Inhalation BID - RT   cefTRIAXone 1 g Intravenous Daily   enoxaparin 40 mg Subcutaneous Q24H   famotidine 20 mg Oral BID AC   guaiFENesin 1,200 mg Oral Q12H   ipratropium-albuterol 3 mL Nebulization Q6H - RT   lisinopril 5 mg Oral Q24H   methylPREDNISolone sodium succinate 40 mg Intravenous Q8H   nicotine 1 patch Transdermal Nightly   polyethylene glycol 17 g Oral Daily   sodium chloride 10 mL Intravenous Q12H        Review of Systems:   Review of Systems - General ROS: negative for - chills or fever  Respiratory ROS: positive for - shortness of breath and wheezing  Cardiovascular ROS: negative for - chest pain or edema  Gastrointestinal ROS: no abdominal pain, change in bowel habits, or black or bloody stools  Musculoskeletal ROS: negative for - gait disturbance or joint pain  Dermatological ROS: negative for pruritus and rash  Neurological: ROS: positive for weakness  Ventilator Settings:     Vt (Set, L): 0.6 L  Resp Rate (Set): 10  Pressure Support (cm H2O): 5 cm H20  FiO2 (%): 35 %  PEEP/CPAP (cm H2O): 5 cm H20  Minute Ventilation (L/min) (Obs): 11.5 L/min  Resp Rate (Observed) Vent: 16  I:E Ratio (Set): 1:1.40  I:E Ratio (Obs): 1:1.8  PIP Observed (cm H2O): 11 cm H2O  RSBI: 42  Physical Exam:  Temp:  [97.2 °F (36.2 °C)-98.3 °F (36.8 °C)] 98.3 °F (36.8 °C)  Heart Rate:  [66-96] 88  Resp:  [14-20] 14  BP: (102-163)/() 135/74  FiO2 (%):  [35 %] 35 %    Intake/Output Summary (Last 24 hours) at 11/18/2019 0855  Last data filed at 11/18/2019  0353  Gross per 24 hour   Intake 3171.51 ml   Output 1750 ml   Net 1421.51 ml     SpO2 Percentage    11/18/19 0500 11/18/19 0600 11/18/19 0624   SpO2: 93% 92% 92%      Physical Exam   Constitutional: He appears well-developed and well-nourished. He does not have a sickly appearance. He does not appear ill. No distress.   Eyes: No scleral icterus.   Cardiovascular: Normal rate and regular rhythm.   No murmur heard.  Pulmonary/Chest: Effort normal. No accessory muscle usage. No respiratory distress. He has decreased breath sounds. He has wheezes. He has no rhonchi.   Skin: He is not diaphoretic.   Psychiatric: His mood appears not anxious. He is not agitated.     Results from last 7 days   Lab Units 11/18/19  0545 11/17/19  0241 11/16/19  0704   WBC 10*3/mm3 8.35 5.22 4.97   HEMOGLOBIN g/dL 8.4* 7.5* 8.1*   PLATELETS 10*3/mm3 333 305 376     Results from last 7 days   Lab Units 11/17/19  0257 11/17/19  0241 11/16/19  0704 11/15/19  1355   SODIUM mmol/L  --  141 142 144   SODIUM, ARTERIAL mmol/L 143  --   --   --    POTASSIUM mmol/L  --  4.4 4.3 3.8   BUN mg/dL  --  19 16 14   CREATININE mg/dL  --  0.99 1.03 1.06     Results from last 7 days   Lab Units 11/17/19  0257 11/16/19  0349 11/15/19  2043   PH, ARTERIAL pH units 7.381 7.489* 7.291*   PCO2, ARTERIAL mm Hg 49.0* 41.1 66.8*   PO2 ART mm Hg 75.4* 56.8* 121.0*   FIO2 % 40 40 60     Blood Culture   Date Value Ref Range Status   11/15/2019 No growth at 24 hours  Preliminary   11/15/2019 No growth at 24 hours  Preliminary     Sputum Gram stain gram-positive and gram-negative organisms    Recent films:  Ct Angiogram Chest With & Without Contrast    Result Date: 11/16/2019  1. No evidence of embolic disease.   2. Infiltrate or atelectasis left lower lobe.   3. Small bilateral pleural effusions are noted. 4. 7 mm nodule left upper lobe. 6 month follow-up is suggested.   This report was finalized on 11/16/2019 17:39 by Dr. Kailash Crouch MD.    Xr Chest 1 View    Result  Date: 11/17/2019  1. No focal infiltrate. 2. Mild bronchial wall thickening, stable.   This report was finalized on 11/17/2019 09:33 by Dr. Elias Pennington MD.    Xr Abdomen Kub    Result Date: 11/16/2019  OG tube placement, as described.   This report was finalized on 11/16/2019 11:39 by Dr. Elias Pennington MD.    Films reviewed personally by me.  My interpretation: new CXR this am however report not final and Epic not allowing me to view images.   Pulmonary Assessment:  1. Severe acute respiratory failure with hypercapnia, improved, hypoxia compensated  2. Personal history of tobacco abuse  3. Possible substantial COPD  4. Probable chronic respiratory acidosis    Recommend:   · Will have PT work with patient   · Oxygen walks   · Encourage IS   · Reduce Solu Medrol from 40 mg every 8 hours to every 12 hours  · Continue bronchodilators and antibiotics, empiric  · Lovenox for DVT prophylaxis   · Pepcid for GI prophylaxis     Electronically signed by ADAN Glasgow on 11/18/2019 at 8:55 AM    Physician substantive portion:  He is feeling much better.  He remains off the ventilator.  He has not gotten any shortness of breath at rest.  He is on IV steroids still at a fairly high dose.  He has not been out of the bed.  Exam shows no distress diminished breath sounds and some wheezing chronic ill appearance.  We will continue enoxaparin continue steroids taper as tolerated.  We can stop the Pepcid at this point now that he is off the ventilator.  Note made of a lung nodule on the CT scan.  Follow-up recommended although his pulmonary function may be poor enough that he very likely will be a non-candidate for any invasive testing.    I have seen and examined patient personally, performing a face-to-face diagnostic evaluation with plan of care reviewed and developed with APRN and nursing staff. I have addended and/or modified the above history of present illness, physical examination, and assessment and plan to  reflect my findings and impressions. Essential elements of the care plan were discussed with APRN above.  Agree with findings and assessment/plan as documented above.    Electronically signed by Waylon Ramey MD, on 11/18/2019, 3:49 PM

## 2019-11-18 NOTE — PLAN OF CARE
Problem: Skin Injury Risk (Adult)  Goal: Identify Related Risk Factors and Signs and Symptoms  Outcome: Ongoing (interventions implemented as appropriate)    Goal: Skin Health and Integrity  Outcome: Ongoing (interventions implemented as appropriate)      Problem: Fall Risk (Adult)  Goal: Absence of Fall  Outcome: Ongoing (interventions implemented as appropriate)      Problem: Patient Care Overview  Goal: Plan of Care Review  Outcome: Ongoing (interventions implemented as appropriate)

## 2019-11-18 NOTE — PROGRESS NOTES
[facility] Medicine Services  INPATIENT PROGRESS NOTE    Patient Name: Rodrigo Javed  Date of Admission: 11/15/2019  Today's Date: 11/18/19  Length of Stay: 3  Primary Care Physician: Delvin Caldera MD    Subjective   Chief Complaint: Shortness of breath.   HPI   He was extubated yesterday.  He feels much better today.  He feels less short of breath.  He has a nonproductive cough.  He is currently on 3 L nasal cannula.  He does not wear any oxygen at home.    Dr. Caldera set him up to have a colonoscopy as an outpatient secondary to his microcytic anemia.  That was scheduled for today at 1300.  This is been canceled given the fact that he is in the hospital.  He will reschedule this on an outpatient basis.  We will check anemia substrates on old blood in the meantime.    He does admit to drinking whiskey on a regular basis.  He does not display any withdrawal symptoms.  He does complain of numbness in his hands and feet.  He has never had a B12 assessed.    He complains of chronic constipation.    Review of Systems   All pertinent negatives and positives are as above. All other systems have been reviewed and are negative unless otherwise stated.     Objective    Temp:  [97.2 °F (36.2 °C)-98.3 °F (36.8 °C)] 98.3 °F (36.8 °C)  Heart Rate:  [66-96] 88  Resp:  [14-20] 14  BP: (102-163)/() 135/74  FiO2 (%):  [35 %] 35 %  Physical Exam   Constitutional: He is oriented to person, place, and time. He appears well-developed and well-nourished.   Up in bed.  No distress.  No family present.  Drinking coffee.  Seen and discussed with his nurse, Major.    HENT:   Head: Normocephalic and atraumatic.   Eyes: Conjunctivae and EOM are normal. Pupils are equal, round, and reactive to light.   Neck: Neck supple. No JVD present.   Cardiovascular: Normal rate, regular rhythm, normal heart sounds and intact distal pulses. Exam reveals no gallop and no friction rub.   No murmur  heard.  Pulmonary/Chest: Effort normal. No respiratory distress. He has wheezes. He has no rales. He exhibits no tenderness.   3L.   Abdominal: Soft. Bowel sounds are normal. He exhibits distension. There is no tenderness. There is no rebound and no guarding.   Musculoskeletal: Normal range of motion. He exhibits no edema, tenderness or deformity.   Neurological: He is alert and oriented to person, place, and time. He displays normal reflexes. No cranial nerve deficit. He exhibits normal muscle tone.   Skin: Skin is warm and dry. No rash noted.   Psychiatric: He has a normal mood and affect. His behavior is normal. Judgment and thought content normal.     Results Review:  I have reviewed the labs, radiology results, and diagnostic studies.    Laboratory Data:   Results from last 7 days   Lab Units 11/18/19  0545 11/17/19  0241 11/16/19  0704   WBC 10*3/mm3 8.35 5.22 4.97   HEMOGLOBIN g/dL 8.4* 7.5* 8.1*   HEMATOCRIT % 31.0* 27.6* 29.9*   PLATELETS 10*3/mm3 333 305 376      Results from last 7 days   Lab Units 11/17/19  0257 11/17/19  0241 11/16/19  0704 11/15/19  1355   SODIUM mmol/L  --  141 142 144   SODIUM, ARTERIAL mmol/L 143  --   --   --    POTASSIUM mmol/L  --  4.4 4.3 3.8   CHLORIDE mmol/L  --  105 104 103   CO2 mmol/L  --  26.0 30.0* 33.0*   BUN mg/dL  --  19 16 14   CREATININE mg/dL  --  0.99 1.03 1.06   CALCIUM mg/dL  --  8.4* 8.5* 9.0   BILIRUBIN mg/dL  --   --  0.6 0.6   ALK PHOS U/L  --   --  119* 145*   ALT (SGPT) U/L  --   --  13 17   AST (SGOT) U/L  --   --  23 27   GLUCOSE mg/dL  --  199* 171* 128*     Culture Data:   Blood Culture   Date Value Ref Range Status   11/15/2019 No growth at 2 days  Preliminary   11/15/2019 No growth at 2 days  Preliminary     Radiology Data:   Imaging Results (Last 24 Hours)     Procedure Component Value Units Date/Time    XR Chest 1 View [[id number]] Updated:  11/18/19 0445    XR Chest 1 View [[id number]] Collected:  11/17/19 0932     Updated:  11/17/19 0936     Narrative:       EXAMINATION:  XR CHEST 1 VW-  11/17/2019 3:20 AM CST     HISTORY: on ventilator; J96.01-Acute respiratory failure with hypoxia;  J96.02-Acute respiratory failure with hypercapnia     COMPARISON: 11/15/2019.     FINDINGS:  There is a new NG tube extending to the stomach. The  endotracheal tube tip is at the T4-5 level. There is no dense  infiltrate. There is mild bronchial wall thickening. Heart size is upper  limits of normal.       Impression:       1. No focal infiltrate.  2. Mild bronchial wall thickening, stable.        This report was finalized on 11/17/2019 09:33 by Dr. Elias Pennington MD.        Results for orders placed during the hospital encounter of 11/15/19   Adult Transthoracic Echo Complete With Contrast if Necessary Per Protocol    Narrative · Left ventricular systolic function is normal. Estimated EF appears to be   in the range of 61 - 65%.  · Left ventricular diastolic dysfunction (grade I) consistent with   impaired relaxation.  · Left ventricular wall thickness is consistent with mild concentric   hypertrophy.  · No hemodynamically significant valvular abnormalities identified on the   study.        I have reviewed the patient's current medications.     Assessment/Plan     Active Hospital Problems    Diagnosis   • **Acute respiratory failure with hypoxia and hypercapnia (CMS/HCC)   • COPD with acute exacerbation (CMS/HCC)   • Pulmonary nodule   • Diastolic dysfunction   • Microcytic anemia   • Hyperglycemia   • Alcoholism /alcohol abuse (CMS/HCC)   • Chronic constipation   • Essential hypertension     Plan:  The patient was originally admitted on 11/15 by Charmaine Yao and Dr. Lamb.  He presented to the emergency department with shortness of breath.  He was felt to be having an exacerbation of chronic obstructive pulmonary disease secondary to long-standing and ongoing tobacco abuse.  His carbon dioxide retention and pH continued to worsen.  Trials of BiPAP were unsuccessful  at controlling this.  He ultimately required intubation mechanical ventilation.  He was extubated on 11/17.    He is being followed by the pulmonary service.    Continue Rocephin.  Add Mucinex.  Incentive spirometry.  Continued home bronchodilators and start Symbicort.  Wean Solu-Medrol.    Counseled for tobacco cessation.  Nicotine patch offered.    He will need a repeat noncontrasted CT scan of the chest in 6 months to reevaluate his left upper lobe pulmonary nodule.    He has hypertension.  He is not currently on an agent for this.  Start low-dose lisinopril.  He has some diastolic dysfunction on echocardiogram.  His BNP was normal and he does not appear to have any signs of CHF on exam.    He is hyperglycemic secondary to steroids. Hemoglobin A1c 6.0.     The patient appears to have a chronic microcytic anemia.  He has been transfused 1 unit packed red blood cells on 11/17.  Anemia substrates were not assessed prior to the administration of blood products.  It appears that I can add iron products to blood obtained prior to transfusion yesterday.    Bowel regimen.     Lovenox for DVT prophylaxis.    Pepcid for peptic ulcer prophylaxis.    Consider transfer to  later today.     Delvin Lopez,    11/18/19   7:51 AM

## 2019-11-18 NOTE — PLAN OF CARE
Problem: Restraint, Nonbehavioral (Nonviolent)  Goal: Rationale and Justification  Outcome: Outcome(s) achieved Date Met: 11/17/19    Goal: Nonbehavioral (Nonviolent) Restraint: Absence of Injury/Harm  Outcome: Outcome(s) achieved Date Met: 11/17/19    Goal: Nonbehavioral (Nonviolent) Restraint: Achievement of Discontinuation Criteria  Outcome: Outcome(s) achieved Date Met: 11/17/19    Goal: Nonbehavioral (Nonviolent) Restraint: Preservation of Dignity and Wellbeing  Outcome: Outcome(s) achieved Date Met: 11/17/19      Problem: Skin Injury Risk (Adult)  Goal: Skin Health and Integrity  Outcome: Ongoing (interventions implemented as appropriate)      Problem: Fall Risk (Adult)  Goal: Absence of Fall  Outcome: Ongoing (interventions implemented as appropriate)      Problem: Nutrition, Enteral (Adult)  Goal: Signs and Symptoms of Listed Potential Problems Will be Absent, Minimized or Managed (Nutrition, Enteral)  Outcome: Ongoing (interventions implemented as appropriate)

## 2019-11-19 PROBLEM — I50.31 ACUTE DIASTOLIC HEART FAILURE (HCC): Status: ACTIVE | Noted: 2019-11-18

## 2019-11-19 LAB
BACTERIA SPEC RESP CULT: ABNORMAL
BACTERIA SPEC RESP CULT: ABNORMAL
GRAM STN SPEC: ABNORMAL
VIT B12 BLD-MCNC: 869 PG/ML (ref 211–946)

## 2019-11-19 PROCEDURE — 94799 UNLISTED PULMONARY SVC/PX: CPT

## 2019-11-19 PROCEDURE — 63710000001 PREDNISONE PER 1 MG: Performed by: NURSE PRACTITIONER

## 2019-11-19 PROCEDURE — 25010000002 IRON SUCROSE PER 1 MG: Performed by: INTERNAL MEDICINE

## 2019-11-19 PROCEDURE — 97116 GAIT TRAINING THERAPY: CPT

## 2019-11-19 PROCEDURE — 25010000002 FUROSEMIDE PER 20 MG: Performed by: INTERNAL MEDICINE

## 2019-11-19 PROCEDURE — 94640 AIRWAY INHALATION TREATMENT: CPT

## 2019-11-19 PROCEDURE — 82607 VITAMIN B-12: CPT | Performed by: INTERNAL MEDICINE

## 2019-11-19 PROCEDURE — 97162 PT EVAL MOD COMPLEX 30 MIN: CPT

## 2019-11-19 PROCEDURE — 25010000002 METHYLPREDNISOLONE PER 40 MG: Performed by: NURSE PRACTITIONER

## 2019-11-19 PROCEDURE — 25010000002 ENOXAPARIN PER 10 MG: Performed by: NURSE PRACTITIONER

## 2019-11-19 PROCEDURE — 25010000002 CEFTRIAXONE PER 250 MG: Performed by: NURSE PRACTITIONER

## 2019-11-19 PROCEDURE — 99232 SBSQ HOSP IP/OBS MODERATE 35: CPT | Performed by: INTERNAL MEDICINE

## 2019-11-19 RX ORDER — LACTULOSE 20 G/30ML
20 SOLUTION ORAL ONCE
Status: COMPLETED | OUTPATIENT
Start: 2019-11-19 | End: 2019-11-19

## 2019-11-19 RX ORDER — FUROSEMIDE 10 MG/ML
40 INJECTION INTRAMUSCULAR; INTRAVENOUS DAILY
Status: DISCONTINUED | OUTPATIENT
Start: 2019-11-19 | End: 2019-11-20

## 2019-11-19 RX ORDER — LISINOPRIL 5 MG/1
5 TABLET ORAL
Status: DISCONTINUED | OUTPATIENT
Start: 2019-11-19 | End: 2019-11-20

## 2019-11-19 RX ORDER — HYDROCODONE BITARTRATE AND ACETAMINOPHEN 5; 325 MG/1; MG/1
1 TABLET ORAL EVERY 6 HOURS PRN
Status: DISCONTINUED | OUTPATIENT
Start: 2019-11-19 | End: 2019-11-22 | Stop reason: HOSPADM

## 2019-11-19 RX ORDER — PREDNISONE 20 MG/1
20 TABLET ORAL DAILY
Status: DISCONTINUED | OUTPATIENT
Start: 2019-11-19 | End: 2019-11-22 | Stop reason: HOSPADM

## 2019-11-19 RX ORDER — HYDROXYZINE HYDROCHLORIDE 25 MG/1
50 TABLET, FILM COATED ORAL NIGHTLY PRN
Status: DISCONTINUED | OUTPATIENT
Start: 2019-11-19 | End: 2019-11-22 | Stop reason: HOSPADM

## 2019-11-19 RX ORDER — FUROSEMIDE 40 MG/1
40 TABLET ORAL DAILY
Status: DISCONTINUED | OUTPATIENT
Start: 2019-11-19 | End: 2019-11-19

## 2019-11-19 RX ADMIN — NICOTINE 1 PATCH: 21 PATCH, EXTENDED RELEASE TRANSDERMAL at 22:45

## 2019-11-19 RX ADMIN — POLYETHYLENE GLYCOL 3350 17 G: 17 POWDER, FOR SOLUTION ORAL at 08:40

## 2019-11-19 RX ADMIN — LISINOPRIL 5 MG: 5 TABLET ORAL at 08:40

## 2019-11-19 RX ADMIN — IRON SUCROSE 100 MG: 20 INJECTION, SOLUTION INTRAVENOUS at 10:37

## 2019-11-19 RX ADMIN — METHYLPREDNISOLONE SODIUM SUCCINATE 40 MG: 40 INJECTION, POWDER, FOR SOLUTION INTRAMUSCULAR; INTRAVENOUS at 08:40

## 2019-11-19 RX ADMIN — GUAIFENESIN 1200 MG: 600 TABLET, EXTENDED RELEASE ORAL at 08:39

## 2019-11-19 RX ADMIN — SODIUM CHLORIDE, PRESERVATIVE FREE 10 ML: 5 INJECTION INTRAVENOUS at 08:41

## 2019-11-19 RX ADMIN — CEFTRIAXONE SODIUM 1 G: 1 INJECTION, POWDER, FOR SOLUTION INTRAMUSCULAR; INTRAVENOUS at 08:39

## 2019-11-19 RX ADMIN — PREDNISONE 20 MG: 20 TABLET ORAL at 10:37

## 2019-11-19 RX ADMIN — IPRATROPIUM BROMIDE AND ALBUTEROL SULFATE 3 ML: 2.5; .5 SOLUTION RESPIRATORY (INHALATION) at 07:15

## 2019-11-19 RX ADMIN — ENOXAPARIN SODIUM 40 MG: 40 INJECTION SUBCUTANEOUS at 17:15

## 2019-11-19 RX ADMIN — SODIUM CHLORIDE, PRESERVATIVE FREE 10 ML: 5 INJECTION INTRAVENOUS at 22:46

## 2019-11-19 RX ADMIN — GUAIFENESIN 1200 MG: 600 TABLET, EXTENDED RELEASE ORAL at 22:45

## 2019-11-19 RX ADMIN — LACTULOSE 20 G: 20 SOLUTION ORAL at 10:41

## 2019-11-19 RX ADMIN — BUDESONIDE AND FORMOTEROL FUMARATE DIHYDRATE 2 PUFF: 160; 4.5 AEROSOL RESPIRATORY (INHALATION) at 07:15

## 2019-11-19 RX ADMIN — FUROSEMIDE 40 MG: 10 INJECTION, SOLUTION INTRAVENOUS at 10:36

## 2019-11-19 NOTE — PLAN OF CARE
Problem: Skin Injury Risk (Adult)  Goal: Skin Health and Integrity  Outcome: Ongoing (interventions implemented as appropriate)   11/18/19 0558   Skin Injury Risk (Adult)   Skin Health and Integrity making progress toward outcome       Problem: Fall Risk (Adult)  Goal: Absence of Fall  Outcome: Ongoing (interventions implemented as appropriate)   11/18/19 0558   Fall Risk (Adult)   Absence of Fall making progress toward outcome       Problem: Patient Care Overview  Goal: Plan of Care Review  Outcome: Ongoing (interventions implemented as appropriate)   11/19/19 0423   Coping/Psychosocial   Plan of Care Reviewed With patient   Plan of Care Review   Progress improving   OTHER   Outcome Summary Pt had no c/o pain; VSS; alert and oriented; sit on the side of the bed most of the shift; will continue to monitor and maintain pt safety       Problem: Nutrition, Enteral (Adult)  Goal: Signs and Symptoms of Listed Potential Problems Will be Absent, Minimized or Managed (Nutrition, Enteral)  Outcome: Ongoing (interventions implemented as appropriate)   11/16/19 1152   Goal/Outcome Evaluation   Problems Assessed (Enteral Nutrition) fluid/electrolyte imbalance;aspiration;gastrointestinal complications;malnutrition;skin/mucosal integrity impairment   Problems Present (Enteral Nutrition) none

## 2019-11-19 NOTE — PLAN OF CARE
Problem: Patient Care Overview  Goal: Plan of Care Review  Outcome: Ongoing (interventions implemented as appropriate)   11/19/19 6078   Coping/Psychosocial   Plan of Care Reviewed With patient   Plan of Care Review   Progress improving   OTHER   Outcome Summary PT eval completed. Pt O2 dependent w/ decrease activity tolerance. Pt performs tfers and ambulates w/ rwx w/ assist of 1, increase assist at times due to LOB requiring assist to recover. Pt tolerated ~ 100 ft this date. Noted occassional scissoring of his feet w/ increase fall risk. Pt will benefit from continued PT services to improve safety awareness, to improve time tolerance to out of bed activity, to assist w/ edema mgmt, and to improve balance reducing need for assist and fall risk. Recommend SNF for continued skilled care upon discharge based on todays evaluation. Pt hopeful his mobility will improve and he could possibly go home w/ assist.

## 2019-11-19 NOTE — PLAN OF CARE
Problem: Patient Care Overview  Goal: Plan of Care Review   11/18/19 2019   Coping/Psychosocial   Plan of Care Reviewed With patient   Plan of Care Review   Progress improving   OTHER   Outcome Summary RECIEVED FROM UNIT THIS SHIFT, VSS, ALERT AND ORIENTED, SLIGHTLY CONFUSED AT TIMES, UP X2 TO STANDING EDGE OF BED, WILL MONITOR

## 2019-11-19 NOTE — PROGRESS NOTES
Discharge Planning Assessment  [facility]     Patient Name: Rodrigo Javed  MRN: [id number]  Today's Date: 11/19/2019    Admit Date: 11/15/2019    Discharge Needs Assessment     Row Name 11/19/19 1024       Living Environment    Lives With  alone    Current Living Arrangements  home/apartment/condo    Primary Care Provided by  self    Provides Primary Care For  pet(s)    Family Caregiver if Needed  none    Quality of Family Relationships  supportive    Able to Return to Prior Arrangements  yes       Resource/Environmental Concerns    Resource/Environmental Concerns  none    Transportation Concerns  car, none       Transition Planning    Patient/Family Anticipates Transition to  home    Patient/Family Anticipated Services at Transition  home health care    Transportation Anticipated  family or friend will provide       Discharge Needs Assessment    Readmission Within the Last 30 Days  no previous admission in last 30 days    Concerns to be Addressed  basic needs;financial/insurance    Equipment Currently Used at Home  cane, straight;walker, rolling;shower chair    Anticipated Changes Related to Illness  none    Equipment Needed After Discharge  none    Outpatient/Agency/Support Group Needs  homecare agency    Discharge Facility/Level of Care Needs  home with home health    Current Discharge Risk  lives alone        Discharge Plan     Row Name 11/19/19 1027       Plan    Plan  Home with HH vs SNF    Patient/Family in Agreement with Plan  yes    Plan Comments  Spoke with pt to assess for home needs.  Pt lives at home alone with his 2 dogs he is worried about, says someone at doctors office took them and afraid he might not get them back.  Pt says he did not have HH care, has needed DME.  Pt does not have RX coverage, says he had appt. today to try to get supplemental ins. but unable since he is here now. Pt agreeable and will be placed on Meds to Beds program. He says he does not have PCP but goes to Dr. Caldera's office  when needed.  He says his friend/dtr will provide him at ride home at d/c.  He is unsure whether he will be able to return home with HH vs SNF at present. Will follow.         Destination      No service coordination in this encounter.      Durable Medical Equipment      No service coordination in this encounter.      Dialysis/Infusion      No service coordination in this encounter.      Home Medical Care      No service coordination in this encounter.      Therapy      No service coordination in this encounter.      Community Resources      No service coordination in this encounter.          Demographic Summary    No documentation.       Functional Status    No documentation.       Psychosocial    No documentation.       Abuse/Neglect    No documentation.       Legal    No documentation.       Substance Abuse    No documentation.       Patient Forms    No documentation.           MELODY Phelps

## 2019-11-19 NOTE — THERAPY EVALUATION
Patient Name: Rodrigo Javed  : 1940    MRN: [id number]                              Today's Date: 2019       Admit Date: 11/15/2019    Visit Dx:     ICD-10-CM ICD-9-CM   1. Acute respiratory failure with hypoxia and hypercapnia (CMS/HCC) J96.01 518.81    J96.02    2. Gait abnormality R26.9 781.2     Patient Active Problem List   Diagnosis   • Acute respiratory failure with hypoxia and hypercapnia (CMS/HCC)   • Essential hypertension   • Microcytic anemia   • COPD with acute exacerbation (CMS/HCC)   • Acute diastolic heart failure (CMS/HCC)   • Pulmonary nodule   • Hyperglycemia   • Alcoholism /alcohol abuse (CMS/HCC)   • Chronic constipation     Past Medical History:   Diagnosis Date   • Cataract    • Hypertension      Past Surgical History:   Procedure Laterality Date   • COLONOSCOPY     • HEMORRHOIDECTOMY       General Information     Row Name 19          PT Evaluation Time/Intention    Document Type  evaluation;other (see comments) see MAR; pt admitted due to acute respiratory failure w/ hypoxia and hypercapnia, HTN, and anemia  -     Mode of Treatment  physical therapy  -     Row Name 19          General Information    Patient Profile Reviewed?  yes  -     Prior Level of Function  independent:;all household mobility;community mobility;driving;shopping reports he has been afraid to drive since falling last week  -     Existing Precautions/Restrictions  fall;oxygen therapy device and L/min  -     Barriers to Rehab  none identified  -     Row Name 19          Relationship/Environment    Lives With  alone  -     Row Name 19          Resource/Environmental Concerns    Current Living Arrangements  home/apartment/condo  -     Row Name 19          Home Main Entrance    Number of Stairs, Main Entrance  five  -JE     Stair Railings, Main Entrance  railings on both sides of stairs  -     Row Name 19          Stairs Within  Home, Primary    Number of Stairs, Within Home, Primary  none  -     Row Name 11/19/19 0819          Cognitive Assessment/Intervention- PT/OT    Orientation Status (Cognition)  oriented x 4  -     Row Name 11/19/19 0819          Safety Issues, Functional Mobility    Safety Issues Affecting Function (Mobility)  safety precaution awareness  -     Impairments Affecting Function (Mobility)  balance;endurance/activity tolerance;shortness of breath;other (see comments) swelling  -       User Key  (r) = Recorded By, (t) = Taken By, (c) = Cosigned By    Initials Name Provider Type    Jayna Saavedra, PT Physical Therapist        Mobility     Row Name 11/19/19 0819          Bed Mobility Assessment/Treatment    Comment (Bed Mobility)  pt sitting up on side of bed upon PT arriving to room, and returned to sit up in a bed  -     Row Name 11/19/19 0819          Sit-Stand Transfer    Sit-Stand [place] (Transfers)  contact guard;verbal cues  -     Assistive Device (Sit-Stand Transfers)  walker, front-wheeled  -     Row Name 11/19/19 0819          Gait/Stairs Assessment/Training    Gait/Stairs Assessment/Training  gait/ambulation independence;gait/ambulation assistive device;distance ambulated;gait pattern;gait deviations  -     [place] Level (Gait)  contact guard;minimum assist (75% patient effort);verbal cues  -     Assistive Device (Gait)  walker, front-wheeled  -     Distance in Feet (Gait)  100 ft  -     Pattern (Gait)  step-through  -     Deviations/Abnormal Patterns (Gait)  base of support, narrow;gait speed decreased;stride length decreased scissors his feet at times  -     Bilateral Gait Deviations  forward flexed posture;heel strike decreased decreased foot clearance  -     Comment (Gait/Stairs)  LOB posteriorly at times requiring assist to recover  -       User Key  (r) = Recorded By, (t) = Taken By, (c) = Cosigned By    Initials Name Provider Type    Jayna Saavedra, PT  "Physical Therapist        Obj/Interventions     Row Name 11/19/19 0819          General ROM    GENERAL ROM COMMENTS  AROM all 4 extremities WFLs  -     Row Name 11/19/19 0819          MMT (Manual Muscle Testing)    General MMT Comments  grossly 4+ to 5/5 throughout assessed in seated positionedu  -     Row Name 11/19/19 0819          Therapeutic Exercise    Sets/Reps (Therapeutic Exercise)  education for frequent aps 15-20 reps every hour awake to assist w/ edema mgmt  -     Comment (Therapeutic Exercise)  education for proper use of IS w/ return demo advised to perform 5 times every hour awake  -     Row Name 11/19/19 0819          Static Sitting Balance    Level of [place] (Unsupported Sitting, Static Balance)  independent  -     Sitting Position (Unsupported Sitting, Static Balance)  sitting on edge of bed  -[facility] Name 11/19/19 0819          Static Standing Balance    Level of [place] (Supported Standing, Static Balance)  contact guard assist  -     Assistive Device Utilized (Supported Standing, Static Balance)  walker, rolling  -     Row Name 11/19/19 0819          Dynamic Standing Balance    Comment, Reaches Outside Midline (Standing, Dynamic Balance)  Noted LOB posteriorly more than once w/ gait requiring increase assist to recover  -     Row Name 11/19/19 0836 11/19/19 0819       Sensory Assessment/Intervention    Sensory General Assessment  --  -  -- intact to light touch, however reports his hand/feet \"feel like they are asleep\" reports tingling in digit #3 over laterally in B hands/feet up into the lower legs  -      User Key  (r) = Recorded By, (t) = Taken By, (c) = Cosigned By    Initials Name Provider Type    Jayna Saavedra, PT Physical Therapist        Goals/Plan     Row Name 11/19/19 0819          Bed Mobility Goal 1 (PT)    Activity/Assistive Device (Bed Mobility Goal 1, PT)  bed mobility activities, all  -     [place] Level/Cues Needed (Bed Mobility " Goal 1, PT)  independent  -JE     Time Frame (Bed Mobility Goal 1, PT)  long term goal (LTG);10 days  -JE     Progress/Outcomes (Bed Mobility Goal 1, PT)  goal ongoing  -     Row Name 11/19/19 0819          Transfer Goal 1 (PT)    Activity/Assistive Device (Transfer Goal 1, PT)  sit-to-stand/stand-to-sit;bed-to-chair/chair-to-bed;walker, rolling  -JE     [place] Level/Cues Needed (Transfer Goal 1, PT)  standby assist;conditional independence  -JE     Time Frame (Transfer Goal 1, PT)  long term goal (LTG);10 days  -JE     Progress/Outcome (Transfer Goal 1, PT)  goal ongoing  -     Row Name 11/19/19 0819          Gait Training Goal 1 (PT)    Activity/Assistive Device (Gait Training Goal 1, PT)  gait (walking locomotion);assistive device use;decrease fall risk;diminish gait deviation;improve balance and speed;increase endurance/gait distance;increase energy conservation;walker, rolling  -JE     [place] Level (Gait Training Goal 1, PT)  standby assist  -JE     Distance (Gait Goal 1, PT)  200 ft w/ less than or equal to 1 standing rest  -JE     Time Frame (Gait Training Goal 1, PT)  long term goal (LTG);10 days  -JE     Progress/Outcome (Gait Training Goal 1, PT)  goal ongoing  -     Row Name 11/19/19 0819          Stairs Goal 1 (PT)    Activity/Assistive Device (Stairs Goal 1, PT)  ascending stairs;descending stairs;using handrail, right;using handrail, left;step-to-step  -JE     [place] Level/Cues Needed (Stairs Goal 1, PT)  contact guard assist  -JE     Number of Stairs (Stairs Goal 1, PT)  5  -JE     Time Frame (Stairs Goal 1, PT)  long term goal (LTG);10 days  -JE     Progress/Outcome (Stairs Goal 1, PT)  goal ongoing  -     Row Name 11/19/19 0819          Patient Education Goal (PT)    Activity (Patient Education Goal, PT)  energy conservation techniques, edema mgmt techniques  -JE     [place]/Cues/Accuracy (Memory Goal 2, PT)  demonstrates adequately;independent;verbalizes understanding   -     Time Frame (Patient Education Goal, PT)  long term goal (LTG);10 days  -     Progress/Outcome (Patient Education Goal, PT)  goal ongoing  -       User Key  (r) = Recorded By, (t) = Taken By, (c) = Cosigned By    Initials Name Provider Type    Jayna Saavedra, PT Physical Therapist        Clinical Impression     Row Name 11/19/19 0819          Pain Assessment    Additional Documentation  Pain Scale: Numbers Pre/Post-Treatment (Group)  -     Row Name 11/19/19 0819          Pain Scale: Numbers Pre/Post-Treatment    Pain Scale: Numbers, Pretreatment  0/10 - no pain  -     Pain Scale: Numbers, Post-Treatment  0/10 - no pain  -     Row Name 11/19/19 0819          Plan of Care Review    Plan of Care Reviewed With  patient  -[facility] Name 11/19/19 0819          Physical Therapy Clinical Impression    Patient/Family Goals Statement (PT Clinical Impression)  improve strength and ability walk/get around  -     Criteria for Skilled Interventions Met (PT Clinical Impression)  yes;treatment indicated  -     Rehab Potential (PT Clinical Summary)  good, to achieve stated therapy goals  -     Predicted Duration of Therapy (PT)  until discharge or goals achieved  -     Row Name 11/19/19 0819          Vital Signs    Pretreatment Heart Rate (beats/min)  102  -JE     Posttreatment Heart Rate (beats/min)  90  -JE     Pre SpO2 (%)  94  -JE     O2 Delivery Pre Treatment  supplemental O2 2 L per NC  -     O2 Delivery Intra Treatment  supplemental O2  -JE     Post SpO2 (%)  92  -JE     Pre Patient Position  Sitting  -JE     Intra Patient Position  Standing  -     Post Patient Position  Sitting reclined  -     Row Name 11/19/19 0819          Positioning and Restraints    Post Treatment Position  chair  -JE     In Chair  notified nsg;reclined;call light within reach;encouraged to call for assist;legs elevated  -       User Key  (r) = Recorded By, (t) = Taken By, (c) = Cosigned By    Initials Name  Provider Type    Jayna Saavedra, PT Physical Therapist        Outcome Measures     Row Name 11/19/19 0819          How much help from another person do you currently need...    Turning from your back to your side while in flat bed without using bedrails?  3  -JE     Moving from lying on back to sitting on the side of a flat bed without bedrails?  3  -JE     Moving to and from a bed to a chair (including a wheelchair)?  3  -JE     Standing up from a chair using your arms (e.g., wheelchair, bedside chair)?  3  -JE     Climbing 3-5 steps with a railing?  3  -JE     To walk in hospital room?  3  -JE     AM-PAC 6 Clicks Score (PT)  18  -     Row Name 11/19/19 0819          Functional Assessment    Outcome Measure Options  AM-PAC 6 Clicks Basic Mobility (PT)  -       User Key  (r) = Recorded By, (t) = Taken By, (c) = Cosigned By    Initials Name Provider Type    Jayna Saavedra, PT Physical Therapist        Physical Therapy Education     Title: PT OT SLP Therapies (Done)     Topic: Physical Therapy (Done)     Point: Mobility training (Done)     Learning Progress Summary           Patient Acceptance, E,TB,NITHIN, ODILIA,KULDEEP,NR by IGNACIA at 11/19/2019  9:36 AM    Comment:  Education re: purpose of PT/importance of activity, proper use of IS, edema mgmt to include LE elevation and freq aps, and safety/falls prevention to include proper placement of hands w/ tfers w/ wx and proper placement of rwx and sequencing                   Point: Home exercise program (Done)     Learning Progress Summary           Patient Acceptance, E,TB,NITHIN, ODILIA,KULDEEP,NR by IGNACIA at 11/19/2019  9:36 AM    Comment:  Education re: purpose of PT/importance of activity, proper use of IS, edema mgmt to include LE elevation and freq aps, and safety/falls prevention to include proper placement of hands w/ tfers w/ wx and proper placement of rwx and sequencing                   Point: Precautions (Done)     Learning Progress Summary           Patient Acceptance,  E,BILLIE,D, ODILIA,DU,NR by IGNACIA at 11/19/2019  9:36 AM    Comment:  Education re: purpose of PT/importance of activity, proper use of IS, edema mgmt to include LE elevation and freq aps, and safety/falls prevention to include proper placement of hands w/ tfers w/ wx and proper placement of rwx and sequencing                               User Key     Initials Effective Dates Name Provider Type Discipline     08/02/18 -  Jayna Webber, PT Physical Therapist PT              PT Recommendation and Plan  Planned Therapy Interventions (PT Eval): balance training, bed mobility training, gait training, home exercise program, patient/family education, postural re-education, ROM (range of motion), stair training, strengthening, transfer training, other (see comments)(safety/falls prevention, edema mgmt, energy conservation techniques)  Outcome Summary/Treatment Plan (PT)  Anticipated Equipment Needs at Discharge (PT): (has rwx, straight cane, )  Anticipated Discharge Disposition (PT): skilled nursing facility, home with 24/7 care, home with home health  Plan of Care Reviewed With: patient  Progress: improving  Outcome Summary: PT eval completed.  Pt O2 dependent w/ decrease activity tolerance.  Pt performs tfers and ambulates w/ rwx w/ assist of 1, increase assist at times due to LOB requiring assist to recover.  Pt tolerated ~ 100 ft this date.  Noted occassional scissoring of his feet w/ increase fall risk.  Pt will benefit from continued PT services to improve safety awareness, to improve time tolerance to out of bed activity, to assist w/ edema mgmt, and to improve balance reducing need for assist and fall risk.  Recommend SNF for continued skilled care upon discharge based on todays evaluation.  Pt hopeful his mobility will improve and he could possibly go home w/ assist.       Time Calculation:   PT Charges     Row Name 11/19/19 1202             Time Calculation    Start Time  0819 -JE      Stop Time  0936 -JE      Time  Calculation (min)  77 min  -IGNACIA      PT Received On  11/19/19  -IGNACIA      PT Goal Re-Cert Due Date  11/29/19  -IGNACIA        User Key  (r) = Recorded By, (t) = Taken By, (c) = Cosigned By    Initials Name Provider Type    Jayna Saavedra, PT Physical Therapist        Therapy Charges for Today     Code Description Service Date Service Provider Modifiers Qty    29905104048 HC PT EVAL MOD COMPLEXITY 4 11/19/2019 Jayna Webber, PT GP 1    [id number]  GAIT TRAINING EA 15 MIN 11/19/2019 Jayna Webber, PT GP 1          PT G-Codes  Outcome Measure Options: AM-PAC 6 Clicks Basic Mobility (PT)  AM-PAC 6 Clicks Score (PT): 18    Jayna Webber, PT  11/19/2019

## 2019-11-19 NOTE — PROGRESS NOTES
PULMONARY AND CRITICAL CARE PROGRESS NOTE - [facility]    Patient: Rodrigo Javed    1940    MR# [id number]    Acct# [id number]  11/19/19   9:15 AM  Referring Provider: Delvin Lopez DO    Chief Complaint: Mechanically ventilated    Interval history: He has done well off of the vent. He is on 2 L with a saturation of 90%.  He is frustrated about not being able to sleep since he was extubated.  He is asking for something to help him get some rest tonight.  PT at the bedside to assess his ability to ambulate on his own.  No new complaints other than sleep.  No family at the bedside.       Meds:    budesonide-formoterol 2 puff Inhalation BID - RT   cefTRIAXone 1 g Intravenous Daily   enoxaparin 40 mg Subcutaneous Q24H   guaiFENesin 1,200 mg Oral Q12H   ipratropium-albuterol 3 mL Nebulization Q6H - RT   methylPREDNISolone sodium succinate 40 mg Intravenous Q12H   nicotine 1 patch Transdermal Nightly   polyethylene glycol 17 g Oral Daily   predniSONE 20 mg Oral Daily   sodium chloride 10 mL Intravenous Q12H        Review of Systems:     Review of Systems - General ROS: negative for - chills or fever  Respiratory ROS: positive for - shortness of breath and wheezing  Cardiovascular ROS: negative for - chest pain or edema  Gastrointestinal ROS: no abdominal pain, change in bowel habits, or black or bloody stools  Musculoskeletal ROS: negative for - gait disturbance or joint pain  Dermatological ROS: negative for pruritus and rash  Neurological: ROS: positive for weakness    Ventilator Settings:     Vt (Set, L): 0.6 L  Resp Rate (Set): 10  Pressure Support (cm H2O): 5 cm H20  FiO2 (%): 35 %  PEEP/CPAP (cm H2O): 5 cm H20  Minute Ventilation (L/min) (Obs): 11.5 L/min  Resp Rate (Observed) Vent: 16  I:E Ratio (Set): 1:1.40  I:E Ratio (Obs): 1:1.8  PIP Observed (cm H2O): 11 cm H2O  RSBI: 42  Physical Exam:  Temp:  [97.8 °F (36.6 °C)-98.6 °F (37 °C)] 97.9 °F (36.6 °C)  Heart Rate:  [69-89] 74  Resp:   [16-18] 18  BP: (126-158)/(60-88) 157/88    Intake/Output Summary (Last 24 hours) at 11/19/2019 0915  Last data filed at 11/19/2019 0603  Gross per 24 hour   Intake --   Output 2025 ml   Net -2025 ml     SpO2 Percentage    11/19/19 0336 11/19/19 0715 11/19/19 0728   SpO2: 96% 96% 94%      Physical Exam   Constitutional: He appears well-developed and well-nourished. He does not have a sickly appearance. He does not appear ill. No distress. Nasal cannula in place.   Eyes: No scleral icterus.   Cardiovascular: Normal rate and regular rhythm.   No murmur heard.  Pulmonary/Chest: Effort normal. No accessory muscle usage. No respiratory distress. He has decreased breath sounds. He has wheezes. He has no rhonchi.   Skin: He is not diaphoretic.   Psychiatric: His mood appears not anxious. He is not agitated.     Results from last 7 days   Lab Units 11/18/19  0545 11/17/19  0241 11/16/19  0704   WBC 10*3/mm3 8.35 5.22 4.97   HEMOGLOBIN g/dL 8.4* 7.5* 8.1*   PLATELETS 10*3/mm3 333 305 376     Results from last 7 days   Lab Units 11/17/19  0257 11/17/19  0241 11/16/19  0704 11/15/19  1355   SODIUM mmol/L  --  141 142 144   SODIUM, ARTERIAL mmol/L 143  --   --   --    POTASSIUM mmol/L  --  4.4 4.3 3.8   BUN mg/dL  --  19 16 14   CREATININE mg/dL  --  0.99 1.03 1.06     Results from last 7 days   Lab Units 11/17/19  0257 11/16/19  0349 11/15/19  2043   PH, ARTERIAL pH units 7.381 7.489* 7.291*   PCO2, ARTERIAL mm Hg 49.0* 41.1 66.8*   PO2 ART mm Hg 75.4* 56.8* 121.0*   FIO2 % 40 40 60     Blood Culture   Date Value Ref Range Status   11/15/2019 No growth at 24 hours  Preliminary   11/15/2019 No growth at 24 hours  Preliminary         Recent films:  No radiology results for the last day  Films reviewed personally by me.  My interpretation: None today      Pulmonary Assessment:    1. Severe acute respiratory failure with hypercapnia, improved, hypoxia compensated  2. Personal history of tobacco abuse  3. Possible substantial  COPD  4. Probable chronic respiratory acidosis    Recommend:     · Transition to oral steroids.  This should help with sleep   · Hydroxyzine at bedtime   · O2 walks with PT    · Encourage IS   · Continue bronchodilators and antibiotics, empiric  · Lovenox for DVT prophylaxis   · Pepcid for GI prophylaxis     Electronically signed by ADAN Unger on 11/19/2019 at 9:15 AM     Physician substantive portion:  Patient is out of the ICU to a regular room.  He has had some apparent agitation overnight and could not sleep.  Exam shows some wheezing, diminished breath sounds but good excursion no accessory muscle use and he is in no distress.  Abdomen is soft.  Plan to try to reduce steroids and add some hypnotics which should help reestablish nocturnal sleep.  Mobilize, physical therapy.  Case was discussed with physical therapy today.    I have seen and examined patient personally, performing a face-to-face diagnostic evaluation with plan of care reviewed and developed with APRN and nursing staff. I have addended and/or modified the above history of present illness, physical examination, and assessment and plan to reflect my findings and impressions. Essential elements of the care plan were discussed with APRN above.  Agree with findings and assessment/plan as documented above.    Electronically signed by Waylon Ramey MD, on 11/19/2019, 10:50 AM

## 2019-11-19 NOTE — PROGRESS NOTES
"    [facility] Medicine Services  INPATIENT PROGRESS NOTE    Patient Name: Rodrigo Javed  Date of Admission: 11/15/2019  Today's Date: 11/19/19  Length of Stay: 4  Primary Care Physician: Delvin Caldera MD    Subjective   Chief Complaint: Shortness of breath.   HPI   He states that yesterday was \"a very bad day.\"  I had a hard time getting him to elaborate on this.  He finally told me that he did not get along well with his nurse once transferred out of the ICU.  She is not on today.    He complains of fluid retention that he feels is worsening in his legs, scrotum.    He still has not had a bowel movement.    Another today is insomnia.  Pulmonary has addressed this.  They have transition him to oral steroids and ordered hydroxyzine for bedtime.    Review of Systems   All pertinent negatives and positives are as above. All other systems have been reviewed and are negative unless otherwise stated.     Objective    Temp:  [97.8 °F (36.6 °C)-98.6 °F (37 °C)] 97.9 °F (36.6 °C)  Heart Rate:  [69-89] 74  Resp:  [16-18] 18  BP: (126-158)/(60-88) 157/88  Physical Exam  Constitutional: He is oriented to person, place, and time. He appears well-developed and well-nourished. Up in the chair.  No distress.  No family present. Discussed with his nurse, Ayleen.    Head: Normocephalic and atraumatic.   Eyes: Conjunctivae and EOM are normal. Pupils are equal, round, and reactive to light.   Neck: Neck supple. No JVD present.   Cardiovascular: Normal rate, regular rhythm, normal heart sounds and intact distal pulses. Exam reveals no gallop and no friction rub.   No murmur heard.  Pulmonary/Chest: Effort normal. No respiratory distress. He has wheezes. He has no rales. He exhibits no tenderness. 2L.   Abdominal: Soft. Bowel sounds are normal. He exhibits distension. There is no tenderness. There is no rebound and no guarding.   Musculoskeletal: Normal range of motion. He exhibits no edema, tenderness " or deformity. 1+ LE edema and some scrotal edema.    Neurological: He is alert and oriented to person, place, and time. He displays normal reflexes. No cranial nerve deficit. He exhibits normal muscle tone.   Skin: Skin is warm and dry. No rash noted.   Psychiatric: He has a normal mood and affect. His behavior is normal. Judgment and thought content normal.     Results Review:  I have reviewed the labs, radiology results, and diagnostic studies.    Laboratory Data:   No new CBC or chemistries.     Culture Data:   Blood Culture   Date Value Ref Range Status   11/15/2019 No growth at 3 days  Preliminary   11/15/2019 No growth at 3 days  Preliminary     Respiratory Culture   Date Value Ref Range Status   11/16/2019 Light growth (2+) Gram Negative Bacilli (A)  Preliminary   11/16/2019 Rare Normal Respiratory Char  Preliminary     I have reviewed the patient's current medications.     Assessment/Plan     Active Hospital Problems    Diagnosis   • **Acute respiratory failure with hypoxia and hypercapnia (CMS/HCC)   • COPD with acute exacerbation (CMS/HCC)   • Pulmonary nodule   • Acute diastolic heart failure (CMS/HCC)   • Microcytic anemia   • Hyperglycemia   • Alcoholism /alcohol abuse (CMS/HCC)   • Chronic constipation   • Essential hypertension     Plan:  The patient was originally admitted on 11/15 by Charmaine Yao and Dr. Lamb.  He presented to the emergency department with shortness of breath.  He was felt to be having an exacerbation of chronic obstructive pulmonary disease secondary to long-standing and ongoing tobacco abuse.  His carbon dioxide retention and pH continued to worsen.  Trials of BiPAP were unsuccessful at controlling this.  He ultimately required intubation mechanical ventilation.  He was extubated on 11/17.     He is being followed by the pulmonary service.     Continue Rocephin. Mucinex and incentive spirometry.  Continue inhaled bronchodilators. Started Symbicort on 11/18.  Weaned  Solu-Medrol to oral prednisone today.     Counseled for tobacco cessation.  Nicotine patch offered.     He will need a repeat noncontrasted CT scan of the chest in 6 months to reevaluate his left upper lobe pulmonary nodule.     He has hypertension.  He was not currently on an agent for this. Start lisinopril.  He has some diastolic dysfunction on echocardiogram.  His BNP was normal at presentation.  He has had some complaints of scrotal edema and now has worsening lower extremity edema.  Start Lasix.       He is hyperglycemic secondary to steroids. Hemoglobin A1c 6.0.      The patient appears to have a chronic microcytic anemia.  He has been transfused 1 unit packed red blood cells on 11/17.  Anemia substrates were not assessed prior to the administration of blood products, but I was able to add them to old blood.  He is significantly iron deficient.  IV Venofer.  Dr. Caldera had him set up to have an outpatient colonoscopy on 11/18, however, he was obviously admitted.  This will need to be rescheduled as an outpatient for further evaluation of his chronic microcytic, iron deficient anemia.     B12 pending.    Bowel regimen.      Lovenox for DVT prophylaxis.     Pepcid for peptic ulcer prophylaxis.    Increase activity.    Discharge Planning: I expect the patient to be discharged to home in 1-2 days.    Delvin Lopez, DO   11/19/19   9:35 AM

## 2019-11-20 ENCOUNTER — APPOINTMENT (OUTPATIENT)
Dept: GENERAL RADIOLOGY | Facility: HOSPITAL | Age: 79
End: 2019-11-20

## 2019-11-20 LAB
ANION GAP SERPL CALCULATED.3IONS-SCNC: 4 MMOL/L (ref 5–15)
BACTERIA SPEC AEROBE CULT: NORMAL
BACTERIA SPEC AEROBE CULT: NORMAL
BUN BLD-MCNC: 21 MG/DL (ref 8–23)
BUN/CREAT SERPL: 22.8 (ref 7–25)
CALCIUM SPEC-SCNC: 9.3 MG/DL (ref 8.6–10.5)
CHLORIDE SERPL-SCNC: 100 MMOL/L (ref 98–107)
CO2 SERPL-SCNC: 36 MMOL/L (ref 22–29)
CREAT BLD-MCNC: 0.92 MG/DL (ref 0.76–1.27)
DEPRECATED RDW RBC AUTO: 52.7 FL (ref 37–54)
ERYTHROCYTE [DISTWIDTH] IN BLOOD BY AUTOMATED COUNT: 20.6 % (ref 12.3–15.4)
GFR SERPL CREATININE-BSD FRML MDRD: 80 ML/MIN/1.73
GLUCOSE BLD-MCNC: 88 MG/DL (ref 65–99)
HCT VFR BLD AUTO: 36.6 % (ref 37.5–51)
HGB BLD-MCNC: 9.8 G/DL (ref 13–17.7)
MCH RBC QN AUTO: 19.6 PG (ref 26.6–33)
MCHC RBC AUTO-ENTMCNC: 26.8 G/DL (ref 31.5–35.7)
MCV RBC AUTO: 73.3 FL (ref 79–97)
PLATELET # BLD AUTO: 399 10*3/MM3 (ref 140–450)
PMV BLD AUTO: 9.8 FL (ref 6–12)
POTASSIUM BLD-SCNC: 4 MMOL/L (ref 3.5–5.2)
RBC # BLD AUTO: 4.99 10*6/MM3 (ref 4.14–5.8)
SODIUM BLD-SCNC: 140 MMOL/L (ref 136–145)
WBC NRBC COR # BLD: 10.53 10*3/MM3 (ref 3.4–10.8)

## 2019-11-20 PROCEDURE — 97530 THERAPEUTIC ACTIVITIES: CPT

## 2019-11-20 PROCEDURE — 99232 SBSQ HOSP IP/OBS MODERATE 35: CPT | Performed by: INTERNAL MEDICINE

## 2019-11-20 PROCEDURE — 94799 UNLISTED PULMONARY SVC/PX: CPT

## 2019-11-20 PROCEDURE — 80048 BASIC METABOLIC PNL TOTAL CA: CPT | Performed by: INTERNAL MEDICINE

## 2019-11-20 PROCEDURE — 25010000002 FUROSEMIDE PER 20 MG: Performed by: INTERNAL MEDICINE

## 2019-11-20 PROCEDURE — 63710000001 PREDNISONE PER 1 MG: Performed by: NURSE PRACTITIONER

## 2019-11-20 PROCEDURE — 71046 X-RAY EXAM CHEST 2 VIEWS: CPT

## 2019-11-20 PROCEDURE — 25010000002 CEFTRIAXONE PER 250 MG: Performed by: NURSE PRACTITIONER

## 2019-11-20 PROCEDURE — 85027 COMPLETE CBC AUTOMATED: CPT | Performed by: INTERNAL MEDICINE

## 2019-11-20 PROCEDURE — 25010000002 IRON SUCROSE PER 1 MG: Performed by: INTERNAL MEDICINE

## 2019-11-20 PROCEDURE — 97116 GAIT TRAINING THERAPY: CPT

## 2019-11-20 PROCEDURE — 25010000002 ENOXAPARIN PER 10 MG: Performed by: NURSE PRACTITIONER

## 2019-11-20 RX ORDER — HYDROCORTISONE ACETATE 25 MG/1
25 SUPPOSITORY RECTAL 2 TIMES DAILY
Status: DISCONTINUED | OUTPATIENT
Start: 2019-11-20 | End: 2019-11-22 | Stop reason: HOSPADM

## 2019-11-20 RX ORDER — FAMOTIDINE 20 MG/1
20 TABLET, FILM COATED ORAL
Status: DISCONTINUED | OUTPATIENT
Start: 2019-11-20 | End: 2019-11-22 | Stop reason: HOSPADM

## 2019-11-20 RX ORDER — FUROSEMIDE 10 MG/ML
40 INJECTION INTRAMUSCULAR; INTRAVENOUS EVERY 12 HOURS
Status: DISCONTINUED | OUTPATIENT
Start: 2019-11-20 | End: 2019-11-22 | Stop reason: HOSPADM

## 2019-11-20 RX ORDER — LISINOPRIL 10 MG/1
10 TABLET ORAL
Status: DISCONTINUED | OUTPATIENT
Start: 2019-11-21 | End: 2019-11-22 | Stop reason: HOSPADM

## 2019-11-20 RX ORDER — ALUMINA, MAGNESIA, AND SIMETHICONE 2400; 2400; 240 MG/30ML; MG/30ML; MG/30ML
15 SUSPENSION ORAL EVERY 6 HOURS PRN
Status: DISCONTINUED | OUTPATIENT
Start: 2019-11-20 | End: 2019-11-22 | Stop reason: HOSPADM

## 2019-11-20 RX ORDER — TEMAZEPAM 15 MG/1
15 CAPSULE ORAL NIGHTLY PRN
Status: DISCONTINUED | OUTPATIENT
Start: 2019-11-20 | End: 2019-11-22 | Stop reason: HOSPADM

## 2019-11-20 RX ADMIN — IPRATROPIUM BROMIDE AND ALBUTEROL SULFATE 3 ML: 2.5; .5 SOLUTION RESPIRATORY (INHALATION) at 14:42

## 2019-11-20 RX ADMIN — NICOTINE 1 PATCH: 21 PATCH, EXTENDED RELEASE TRANSDERMAL at 22:58

## 2019-11-20 RX ADMIN — FUROSEMIDE 40 MG: 10 INJECTION, SOLUTION INTRAVENOUS at 07:58

## 2019-11-20 RX ADMIN — BUDESONIDE AND FORMOTEROL FUMARATE DIHYDRATE 2 PUFF: 160; 4.5 AEROSOL RESPIRATORY (INHALATION) at 18:55

## 2019-11-20 RX ADMIN — IPRATROPIUM BROMIDE AND ALBUTEROL SULFATE 3 ML: 2.5; .5 SOLUTION RESPIRATORY (INHALATION) at 06:45

## 2019-11-20 RX ADMIN — HYDROXYZINE HYDROCHLORIDE 50 MG: 25 TABLET ORAL at 01:33

## 2019-11-20 RX ADMIN — PREDNISONE 20 MG: 20 TABLET ORAL at 07:57

## 2019-11-20 RX ADMIN — GUAIFENESIN 1200 MG: 600 TABLET, EXTENDED RELEASE ORAL at 22:57

## 2019-11-20 RX ADMIN — IRON SUCROSE 100 MG: 20 INJECTION, SOLUTION INTRAVENOUS at 10:17

## 2019-11-20 RX ADMIN — FUROSEMIDE 40 MG: 10 INJECTION, SOLUTION INTRAVENOUS at 22:57

## 2019-11-20 RX ADMIN — TEMAZEPAM 15 MG: 15 CAPSULE ORAL at 22:57

## 2019-11-20 RX ADMIN — GUAIFENESIN 1200 MG: 600 TABLET, EXTENDED RELEASE ORAL at 07:57

## 2019-11-20 RX ADMIN — CEFTRIAXONE SODIUM 1 G: 1 INJECTION, POWDER, FOR SOLUTION INTRAMUSCULAR; INTRAVENOUS at 07:56

## 2019-11-20 RX ADMIN — METOPROLOL TARTRATE 12.5 MG: 25 TABLET, FILM COATED ORAL at 22:59

## 2019-11-20 RX ADMIN — IPRATROPIUM BROMIDE AND ALBUTEROL SULFATE 3 ML: 2.5; .5 SOLUTION RESPIRATORY (INHALATION) at 18:55

## 2019-11-20 RX ADMIN — HYDROCORTISONE ACETATE 25 MG: 25 SUPPOSITORY RECTAL at 22:58

## 2019-11-20 RX ADMIN — POLYETHYLENE GLYCOL 3350 17 G: 17 POWDER, FOR SOLUTION ORAL at 07:58

## 2019-11-20 RX ADMIN — BUDESONIDE AND FORMOTEROL FUMARATE DIHYDRATE 2 PUFF: 160; 4.5 AEROSOL RESPIRATORY (INHALATION) at 06:46

## 2019-11-20 RX ADMIN — ENOXAPARIN SODIUM 40 MG: 40 INJECTION SUBCUTANEOUS at 17:02

## 2019-11-20 RX ADMIN — FAMOTIDINE 20 MG: 20 TABLET, FILM COATED ORAL at 16:45

## 2019-11-20 RX ADMIN — SODIUM CHLORIDE, PRESERVATIVE FREE 10 ML: 5 INJECTION INTRAVENOUS at 22:58

## 2019-11-20 RX ADMIN — METOPROLOL TARTRATE 12.5 MG: 25 TABLET, FILM COATED ORAL at 11:49

## 2019-11-20 RX ADMIN — LISINOPRIL 5 MG: 5 TABLET ORAL at 07:58

## 2019-11-20 RX ADMIN — SODIUM CHLORIDE, PRESERVATIVE FREE 10 ML: 5 INJECTION INTRAVENOUS at 07:59

## 2019-11-20 RX ADMIN — ALUMINUM HYDROXIDE, MAGNESIUM HYDROXIDE, AND DIMETHICONE 15 ML: 400; 400; 40 SUSPENSION ORAL at 16:45

## 2019-11-20 NOTE — PLAN OF CARE
Problem: Skin Injury Risk (Adult)  Goal: Identify Related Risk Factors and Signs and Symptoms  Outcome: Outcome(s) achieved Date Met: 11/20/19    Goal: Skin Health and Integrity  Outcome: Ongoing (interventions implemented as appropriate)      Problem: Fall Risk (Adult)  Goal: Identify Related Risk Factors and Signs and Symptoms  Outcome: Outcome(s) achieved Date Met: 11/20/19    Goal: Absence of Fall  Outcome: Ongoing (interventions implemented as appropriate)      Problem: Patient Care Overview  Goal: Plan of Care Review  Outcome: Ongoing (interventions implemented as appropriate)   11/20/19 0651   Coping/Psychosocial   Plan of Care Reviewed With patient   Plan of Care Review   Progress no change   OTHER   Outcome Summary Pt up ad catarino in room with walker. Safety maintained. O2 increased to 3L while resting. PO intake continue to improve. Continue to monitor.       Problem: Nutrition, Enteral (Adult)  Goal: Signs and Symptoms of Listed Potential Problems Will be Absent, Minimized or Managed (Nutrition, Enteral)  Outcome: Ongoing (interventions implemented as appropriate)

## 2019-11-20 NOTE — PROGRESS NOTES
[facility] Medicine Services  INPATIENT PROGRESS NOTE    Patient Name: Rodrigo Javed  Date of Admission: 11/15/2019  Today's Date: 11/20/19  Length of Stay: 5  Primary Care Physician: Delvin Caldera MD    Subjective   Chief Complaint: Shortness of breath.   HPI   He continues to complain of shortness of breath.  His oxygen has been weaned to 2 L.  He feels that his chest congestion is breaking up.  He complains of swollen legs still.  He needs to have his diuretics increased.  He is also been hypertensive and needs to have his antihypertensives addressed.    He did have a bowel movement.    He would like something to help him sleep at night.    Review of Systems   All pertinent negatives and positives are as above. All other systems have been reviewed and are negative unless otherwise stated.     Objective    Temp:  [97.9 °F (36.6 °C)-98.5 °F (36.9 °C)] 98.1 °F (36.7 °C)  Heart Rate:  [75-94] 86  Resp:  [16-18] 16  BP: (129-175)/(67-90) 175/78  Physical Exam  Constitutional: He is oriented to person, place, and time. He appears well-developed and well-nourished. Up in the chair.  No distress.  No family present. Discussed with his nurse, Ayleen.    Head: Normocephalic and atraumatic.   Eyes: Conjunctivae and EOM are normal. Pupils are equal, round, and reactive to light.   Neck: Neck supple. No JVD present.   Cardiovascular: Normal rate, regular rhythm, normal heart sounds and intact distal pulses. Exam reveals no gallop and no friction rub.   No murmur heard.  Pulmonary/Chest: Effort normal. No respiratory distress. He has wheezes. He has no rales. He exhibits no tenderness. 2L.   Abdominal: Soft. Bowel sounds are normal. He exhibits distension. There is no tenderness. There is no rebound and no guarding.   Musculoskeletal: Normal range of motion. He exhibits no edema, tenderness or deformity. 1+ LE edema and some scrotal edema.    Neurological: He is alert and oriented to  person, place, and time. He displays normal reflexes. No cranial nerve deficit. He exhibits normal muscle tone.   Skin: Skin is warm and dry. No rash noted.   Psychiatric: He has a normal mood and affect. His behavior is normal. Judgment and thought content normal.     Intake/Output Summary (Last 24 hours) at 11/20/2019 1058  Last data filed at 11/20/2019 1017  Gross per 24 hour   Intake 820 ml   Output 4575 ml   Net -3755 ml     Results Review:  I have reviewed the labs, radiology results, and diagnostic studies.    Laboratory Data:   Results from last 7 days   Lab Units 11/20/19  0613 11/18/19  0545 11/17/19  0241   WBC 10*3/mm3 10.53 8.35 5.22   HEMOGLOBIN g/dL 9.8* 8.4* 7.5*   HEMATOCRIT % 36.6* 31.0* 27.6*   PLATELETS 10*3/mm3 399 333 305      Results from last 7 days   Lab Units 11/20/19  0613 11/17/19  0257 11/17/19  0241 11/16/19  0704 11/15/19  1355   SODIUM mmol/L 140  --  141 142 144   SODIUM, ARTERIAL mmol/L  --  143  --   --   --    POTASSIUM mmol/L 4.0  --  4.4 4.3 3.8   CHLORIDE mmol/L 100  --  105 104 103   CO2 mmol/L 36.0*  --  26.0 30.0* 33.0*   BUN mg/dL 21  --  19 16 14   CREATININE mg/dL 0.92  --  0.99 1.03 1.06   CALCIUM mg/dL 9.3  --  8.4* 8.5* 9.0   BILIRUBIN mg/dL  --   --   --  0.6 0.6   ALK PHOS U/L  --   --   --  119* 145*   ALT (SGPT) U/L  --   --   --  13 17   AST (SGOT) U/L  --   --   --  23 27   GLUCOSE mg/dL 88  --  199* 171* 128*     Culture Data:   Blood Culture   Date Value Ref Range Status   11/15/2019 No growth at 4 days  Preliminary   11/15/2019 No growth at 4 days  Preliminary     Respiratory Culture   Date Value Ref Range Status   11/16/2019 Light growth (2+) Acinetobacter baumannii (A)  Final   11/16/2019 Rare Normal Respiratory Char  Final     I have reviewed the patient's current medications.     Assessment/Plan     Active Hospital Problems    Diagnosis   • **Acute respiratory failure with hypoxia and hypercapnia (CMS/HCC)   • COPD with acute exacerbation (CMS/HCC)   •  Pulmonary nodule   • Acute diastolic heart failure (CMS/HCC)   • Microcytic anemia   • Hyperglycemia   • Alcoholism /alcohol abuse (CMS/HCC)   • Chronic constipation   • Essential hypertension     Plan:  The patient was originally admitted on 11/15 by Charmaine Yao and Dr. Lamb.  He presented to the emergency department with shortness of breath.  He was felt to be having an exacerbation of chronic obstructive pulmonary disease secondary to long-standing and ongoing tobacco abuse.  His carbon dioxide retention and pH continued to worsen.  Trials of BiPAP were unsuccessful at controlling this.  He ultimately required intubation mechanical ventilation.  He was extubated on 11/17.     He is being followed by the pulmonary service.     Continue Rocephin.  He has grown pan susceptible Acinetobacter on sputum culture.  He does not have a clear pneumonia.  This is likely colonization.  Mucinex and incentive spirometry. Continue inhaled bronchodilators. Started Symbicort on 11/18. Weaned Solu-Medrol to oral prednisone on 11/19.     Counseled for tobacco cessation.  Nicotine patch offered.     He will need a repeat noncontrasted CT scan of the chest in 6 months to reevaluate his left upper lobe pulmonary nodule.    Dr. Ramey is repeating a PA lateral chest x-ray today.     He has hypertension.  He was not currently on an agent for this. Start lisinopril on 11/19 and will uptitrate.   Add metoprolol tartrate.  He has some diastolic dysfunction on echocardiogram.  His BNP was normal at presentation.  He has had some complaints of scrotal edema and now has worsening lower extremity edema. Lasix IV BID.       He is hyperglycemic secondary to steroids. Hemoglobin A1c 6.0.      The patient appears to have a chronic microcytic anemia.  He has been transfused 1 unit packed red blood cells on 11/17.  Anemia substrates were not assessed prior to the administration of blood products, but I was able to add them to old blood.  He is  significantly iron deficient.  IV Venofer.  Dr. Caldera had him set up to have an outpatient colonoscopy on 11/18, however, he was obviously admitted.  This will need to be rescheduled as an outpatient for further evaluation of his chronic microcytic, iron deficient anemia.     B12 normal.     Bowel regimen.      Lovenox for DVT prophylaxis.     Pepcid for peptic ulcer prophylaxis.     Increase activity.     Discharge Planning: I expect the patient to be discharged to home in 1-3 days.    Delvin Lopez,    11/20/19   10:57 AM

## 2019-11-20 NOTE — PROGRESS NOTES
Continued Stay Note   [place]     Patient Name: Rodrigo Javed  MRN: [id number]  Today's Date: 11/20/2019    Admit Date: 11/15/2019    Discharge Plan     Row Name 11/20/19 1242       Plan    Plan  SNF    Patient/Family in Agreement with Plan  yes    Plan Comments  Spoke with pt/dtr in room to reassess home needs. They both are in agreement he will need some type of rehab before returning home. They prefer the [facility] area, will list and inform if bed offered/accepted.     Row Name 11/20/19 1143       Plan    Plan Comments  AGREES TO PURCHASE [facility] DEPARTMENT PROGRAM H/P SENT CONSENT SIGNED GIORGIO NOTIFIED        Discharge Codes    No documentation.             MELODY Phelps

## 2019-11-20 NOTE — PLAN OF CARE
Problem: Skin Injury Risk (Adult)  Goal: Skin Health and Integrity  Outcome: Ongoing (interventions implemented as appropriate)   11/19/19 1816   Skin Injury Risk (Adult)   Skin Health and Integrity making progress toward outcome       Problem: Fall Risk (Adult)  Goal: Absence of Fall  Outcome: Ongoing (interventions implemented as appropriate)   11/19/19 1816   Fall Risk (Adult)   Absence of Fall making progress toward outcome       Problem: Patient Care Overview  Goal: Plan of Care Review  Outcome: Ongoing (interventions implemented as appropriate)   11/19/19 1816   Coping/Psychosocial   Plan of Care Reviewed With patient   Plan of Care Review   Progress improving   OTHER   Outcome Summary Vital signs stable; pt has been on 2 L O2 nasal cannula throughout shift and remained in the low 90s; pt complains of constipation, was given lactulose and miralax this AM; no complaints of pain throughout shift; safety maintained, will continue to monitor.       Problem: Nutrition, Enteral (Adult)  Goal: Signs and Symptoms of Listed Potential Problems Will be Absent, Minimized or Managed (Nutrition, Enteral)  Outcome: Ongoing (interventions implemented as appropriate)   11/19/19 1816   Goal/Outcome Evaluation   Problems Assessed (Enteral Nutrition) all   Problems Present (Enteral Nutrition) none

## 2019-11-20 NOTE — PLAN OF CARE
Problem: Patient Care Overview  Goal: Plan of Care Review  Outcome: Ongoing (interventions implemented as appropriate)   11/20/19 1013   Coping/Psychosocial   Plan of Care Reviewed With patient   Plan of Care Review   Progress improving   OTHER   Outcome Summary Pt sitting up in chair. 3L o2 98%. sit-stand sba Pt amb 100'x2 rwx sba had unsteady gait at times but self correction of any LOB. discussed w/ pt home safety and POC. Pt able to go up/down 5 steps sba cues for safety. Monitor o2 amb on RA o2 dropped 87%. Pt would benfefit from HH upon d/c may need o2 for home as well

## 2019-11-20 NOTE — PROGRESS NOTES
Continued Stay Note   [place]     Patient Name: Rodrigo Javed  MRN: [id number]  Today's Date: 11/20/2019    Admit Date: 11/15/2019    Discharge Plan     Row Name 11/20/19 1143       Plan    Plan Comments  AGREES TO PURCHASE [facility] DEPARTMENT PROGRAM H/P SENT CONSENT SIGNED GIORGIO NOTIFIED        Discharge Codes    No documentation.             Karoline Nur RN

## 2019-11-20 NOTE — PLAN OF CARE
Problem: Skin Injury Risk (Adult)  Goal: Skin Health and Integrity  Outcome: Ongoing (interventions implemented as appropriate)      Problem: Fall Risk (Adult)  Goal: Absence of Fall  Outcome: Ongoing (interventions implemented as appropriate)      Problem: Patient Care Overview  Goal: Plan of Care Review  Outcome: Ongoing (interventions implemented as appropriate)   11/20/19 1622   Coping/Psychosocial   Plan of Care Reviewed With patient   Plan of Care Review   Progress improving       Problem: Nutrition, Enteral (Adult)  Goal: Signs and Symptoms of Listed Potential Problems Will be Absent, Minimized or Managed (Nutrition, Enteral)  Outcome: Ongoing (interventions implemented as appropriate)

## 2019-11-20 NOTE — PROGRESS NOTES
PULMONARY AND CRITICAL CARE PROGRESS NOTE - [facility]    Patient: Rodrigo Javed    1940    MR# [id number]    Acct# [id number]  11/20/19   12:02 PM  Referring Provider: Delvin Lopez DO    Chief Complaint: Mechanically ventilated    Interval history: He is sitting up in the chair this morning eating breakfast.  He appears to be breathing comfortably on nasal cannula at 2 L.  He reports waking up around 2 AM with a congested cough.  He is coughing up some mucus although he is having difficulty producing it.  He is afebrile.  No other new issues to report.  No family at the bedside.         Meds:    budesonide-formoterol 2 puff Inhalation BID - RT   cefTRIAXone 1 g Intravenous Daily   enoxaparin 40 mg Subcutaneous Q24H   furosemide 40 mg Intravenous Q12H   guaiFENesin 1,200 mg Oral Q12H   ipratropium-albuterol 3 mL Nebulization Q6H - RT   iron sucrose (VENOFER) IVPB 100 mg Intravenous Q24H   [START ON 11/21/2019] lisinopril 10 mg Oral Q24H   metoprolol tartrate 12.5 mg Oral Q12H   nicotine 1 patch Transdermal Nightly   polyethylene glycol 17 g Oral Daily   predniSONE 20 mg Oral Daily   sodium chloride 10 mL Intravenous Q12H        Review of Systems:     Review of Systems - General ROS: negative for - chills or fever  Respiratory ROS: positive for - shortness of breath and wheezing  Cardiovascular ROS: negative for - chest pain or edema  Gastrointestinal ROS: no abdominal pain, change in bowel habits, or black or bloody stools  Musculoskeletal ROS: negative for - gait disturbance or joint pain  Dermatological ROS: negative for pruritus and rash  Neurological: ROS: positive for weakness    Ventilator Settings:     Vt (Set, L): 0.6 L  Resp Rate (Set): 10  Pressure Support (cm H2O): 5 cm H20  FiO2 (%): 35 %  PEEP/CPAP (cm H2O): 5 cm H20  Minute Ventilation (L/min) (Obs): 11.5 L/min  Resp Rate (Observed) Vent: 16  I:E Ratio (Set): 1:1.40  I:E Ratio (Obs): 1:1.8  PIP Observed (cm H2O): 11 cm  H2O  RSBI: 42  Physical Exam:  Temp:  [97.9 °F (36.6 °C)-98.5 °F (36.9 °C)] 98.5 °F (36.9 °C)  Heart Rate:  [75-94] 77  Resp:  [16-18] 16  BP: (118-175)/(70-90) 118/81    Intake/Output Summary (Last 24 hours) at 11/20/2019 1202  Last data filed at 11/20/2019 1116  Gross per 24 hour   Intake 820 ml   Output 4075 ml   Net -3255 ml     SpO2 Percentage    11/20/19 0645 11/20/19 0740 11/20/19 1116   SpO2: 98% 93% 95%      Physical Exam   Constitutional: He appears well-developed and well-nourished. He does not have a sickly appearance. He does not appear ill. No distress. Nasal cannula in place.   Eyes: No scleral icterus.   Cardiovascular: Normal rate and regular rhythm.   No murmur heard.  Pulmonary/Chest: Effort normal. No accessory muscle usage. No respiratory distress. He has decreased breath sounds. He has wheezes. He has no rhonchi.   Musculoskeletal: He exhibits edema.   Skin: He is not diaphoretic.   Psychiatric: His mood appears not anxious. He is not agitated.     Results from last 7 days   Lab Units 11/20/19  0613 11/18/19  0545 11/17/19  0241   WBC 10*3/mm3 10.53 8.35 5.22   HEMOGLOBIN g/dL 9.8* 8.4* 7.5*   PLATELETS 10*3/mm3 399 333 305     Results from last 7 days   Lab Units 11/20/19  0613 11/17/19  0257 11/17/19  0241 11/16/19  0704   SODIUM mmol/L 140  --  141 142   SODIUM, ARTERIAL mmol/L  --  143  --   --    POTASSIUM mmol/L 4.0  --  4.4 4.3   BUN mg/dL 21  --  19 16   CREATININE mg/dL 0.92  --  0.99 1.03     Results from last 7 days   Lab Units 11/17/19  0257 11/16/19  0349 11/15/19  2043   PH, ARTERIAL pH units 7.381 7.489* 7.291*   PCO2, ARTERIAL mm Hg 49.0* 41.1 66.8*   PO2 ART mm Hg 75.4* 56.8* 121.0*   FIO2 % 40 40 60     Blood Culture   Date Value Ref Range Status   11/15/2019 No growth at 24 hours  Preliminary   11/15/2019 No growth at 24 hours  Preliminary   Recent films:  No radiology results for the last day  Films reviewed personally by me.  My interpretation: Pending       Pulmonary  Assessment:    1. Severe acute respiratory failure with hypercapnia, improved, hypoxia compensated  2. Personal history of tobacco abuse  3. Possible substantial COPD  4. Probable chronic respiratory acidosis    Recommend:     · Chest x-ray today  · Hydroxyzine at bedtime   · O2 walks with PT    · Encourage IS   · And flutter  · Continue Mucinex  · Continue bronchodilators and antibiotics, empiric  · Lovenox for DVT prophylaxis   · Pepcid for GI prophylaxis     Electronically signed by ADAN Unger on 11/20/2019 at 12:02 PM     Physician substantive portion:  Patient is less agitated today, with no additional new complaints.  Chest exam shows diminished breath sounds and coarse breath sounds and some mild wheeze.  We will continue all of the same therapies, mobilize.  Continue prednisone.    I have seen and examined patient personally, performing a face-to-face diagnostic evaluation with plan of care reviewed and developed with APRN and nursing staff. I have addended and/or modified the above history of present illness, physical examination, and assessment and plan to reflect my findings and impressions. Essential elements of the care plan were discussed with APRN above.  Agree with findings and assessment/plan as documented above.    Electronically signed by Waylon Ramey MD, on 11/20/2019, 2:17 PM

## 2019-11-20 NOTE — DISCHARGE PLACEMENT REQUEST
"[facility]  CHRISTIAN HANKINS RN CM [id number]      Yuli Christian (79 y.o. Male)     Date of Birth Social Security Number Address Home Phone MRN    1940  [id number] [place] [id number] [phone] [id number]    [employer] Marital Status          Yazidism Single       Admission Date Admission Type Admitting Provider Attending Provider Department, Room/Bed    11/15/19 Emergency Delvin Lopez DO Hancock, John C, DO [facility] 4C, 473/1    Discharge Date Discharge Disposition Discharge Destination                       Attending Provider:  Delvin Lopez DO    Allergies:  Tetracycline    Isolation:  None   Infection:  None   Code Status:  CPR    Ht:  162.6 cm (64.02\")   Wt:  96.5 kg (212 lb 11.2 oz)    Admission Cmt:  None   Principal Problem:  Acute respiratory failure with hypoxia and hypercapnia (CMS/HCC) [J96.01,J96.02]                 Active Insurance as of 11/15/2019     Primary Coverage     Payor Plan Insurance Group Employer/Plan Group    MEDICARE MEDICARE A & B      Payor Plan Address Payor Plan Phone Number Payor Plan Fax Number Effective Dates    [address] [phone]  11/1/2007 - None Entered    [place] [address]       Subscriber Name Subscriber Birth Date Member ID       YULI CHRISTIAN 1940 [id number]                 Emergency Contacts      (Rel.) Home Phone Work Phone Mobile Phone    Jose Alejandro Leonardo (Daughter) [phone] -- --    HARDY FRANCOIS (Friend) [phone] -- --    LUIS FRANCOIS (Friend) [phone] -- --               History & Physical      Charmaine Yao APRN at 11/15/19 1722     Attestation signed by Maximino Lamb DO at 11/15/19 5713    I personally evaluated and examined the patient in conjunction with ADAN Mcgraw and agree with the assessment, treatment plan, and disposition of the patient as recorded by her. My history, exam, and further recommendations are:     I discussed the " "patient's condition with Dr. Barr in the emergency department.  Patient is noted to have progressive worsening of his respiratory condition and progressive acidosis despite Vapotherm and then conversion to BiPAP.  We are unable to blow off CO2 and his pH continues to decline.  I asked Dr. Barr he could intubate the patient in the emergency department.  I understand there are no critical care beds available so the patient will stay in the emergency department.  Orders have been written for propofol sedation and pulmonary consultation for ventilator management.    Maximino FLANNERYKaron Lamb, DO  11/15/19  6:04 PM                        [facility]  HISTORY AND PHYSICAL    Date of Admission: 11/15/2019  Primary Care Physician: Delvin Caldera MD    Subjective     Chief Complaint: Shortness of breathing with declining condition    History of Present Illness  HPI obtained from medical record/emergency department note and discussion with Dr. Barr as patient currently is obtunded.  Apparently patient was seen at \"clinic\" 2 days ago after a fall, x-rays were completed and he was stated there was no fractures.  He complained of jock itch and they did provide medication.  He has subsequently developed scrotal and abdominal swelling that he feels is secondary to medication used to treat issue.  He returned to clinic today and was informed he needed to come to the emergency department to \"get fluid drained off\".  It is surmised they felt he was having a heart failure exacerbation however laboratory studies and x-rays do not indicate this.  Patient stated his legs are no larger from edema than normal.  Patient was placed on Vapotherm, repeat ABGs worse than initial, subsequently changed to BiPAP and again they continue to decline.  There is discussion regarding possible need to intubate however we will continue with conservative measures for now.  Patient will nod or shake head " "to questions however I am unable to determine answers from that behavior.  Lung sounds are extremely coarse throughout patient is noted to have anemia with hemoglobin of 8.5, elevated d-dimer 1.81 will determine if we will proceed with CTA when respiratory status more stable.  Currently patient is unable to answer any questions.  He is going to be housed in the emergency department for now until critical bed opens.  Condition is critical.    Review of Systems   Unable to determine due to patient's altered mental status    Past Medical History:   Past Medical History:   Diagnosis Date   • Cataract    • Hypertension        Past Surgical History:   Past Surgical History:   Procedure Laterality Date   • COLONOSCOPY     • HEMORRHOIDECTOMY         Family History: family history is not on file.  Unable to determine due to patient's altered mental status    Social History:  reports that he has been smoking.  He has a 82.50 pack-year smoking history. He has never used smokeless tobacco. He reports that he drinks about 8.4 oz of alcohol per week. He reports that he does not use drugs.    Code Status: Full      Allergies:  Allergies   Allergen Reactions   • Tetracycline Other (See Comments)       Medications:  Prior to Admission medications    Medication Sig Start Date End Date Taking? Authorizing Provider   Ascorbic Acid (VITAMIN C) 500 MG capsule     Amira Huizar MD   ipratropium-albuterol (COMBIVENT RESPIMAT)  MCG/ACT inhaler Inhale 1 puff 4 times a day by inhalation route. 10/30/19   Amira Huizar MD   vitamin A 95402 UNIT capsule Take 10,000 Units by mouth Daily.    Amira Huizar MD   Vitamin D, Cholecalciferol, (CHOLECALCIFEROL) 10 MCG (400 UNIT) tablet Take 400 Units by mouth Daily.    Amira Huizar MD       Objective     /78   Pulse 77   Temp 98.1 °F (36.7 °C) (Oral)   Resp 18   Ht 165.1 cm (65\")   Wt 88.9 kg (196 lb)   SpO2 96%   BMI 32.62 kg/m²    Physical Exam "   Constitutional: He appears well-developed and well-nourished.   HENT:   Head: Normocephalic and atraumatic.   Eyes: EOM are normal. Pupils are equal, round, and reactive to light.   Neck: Neck supple. No tracheal deviation present.   Cardiovascular: Normal rate, regular rhythm and normal heart sounds.   No murmur heard.  Pulmonary/Chest: He is in respiratory distress.   Coarseness throughout   Abdominal: Soft. Bowel sounds are normal. He exhibits distension.   Musculoskeletal: He exhibits edema (trace bilateral lower extremities).   Neurological:   Obtunded   Skin: Skin is warm and dry. There is pallor.   Psychiatric:   Flat       Pertinent Data:   Lab Results (last 72 hours)     Procedure Component Value Units Date/Time    Blood Gas, Arterial [[id number]]  (Abnormal) Collected:  11/15/19 1630    Specimen:  Arterial Blood Updated:  11/15/19 1640     Site Right Radial     Dov's Test Positive     pH, Arterial 7.309 pH units      Comment: 84 Value below reference range        pCO2, Arterial 66.1 mm Hg      Comment: 83 Value above reference range        pO2, Arterial 76.8 mm Hg      Comment: 84 Value below reference range        HCO3, Arterial 33.2 mmol/L      Comment: 83 Value above reference range        Base Excess, Arterial 5.9 mmol/L      Comment: 83 Value above reference range        O2 Saturation, Arterial 94.4 %      Temperature 37.0 C      Barometric Pressure for Blood Gas 759 mmHg      Modality BiPap     FIO2 30 %      Ventilator Mode NA     Set [facility] Resp Rate 16.0     IPAP 20     Comment: Meter: [id number]     :  [id number]        EPAP 6     Collected by [id number]    Blood Gas, Arterial [[id number]]  (Abnormal) Collected:  11/15/19 1514    Specimen:  Arterial Blood Updated:  11/15/19 1518     Site Left Radial     Dov's Test Positive     pH, Arterial 7.321 pH units      Comment: 84 Value below reference range        pCO2, Arterial 64.5 mm Hg      Comment: 83 Value above reference range        pO2,  Arterial 74.0 mm Hg      Comment: 84 Value below reference range        HCO3, Arterial 33.3 mmol/L      Comment: 83 Value above reference range        Base Excess, Arterial 6.2 mmol/L      Comment: 83 Value above reference range        O2 Saturation, Arterial 93.9 %      Comment: 84 Value below reference range        Temperature 37.0 C      Barometric Pressure for Blood Gas 759 mmHg      Modality Heated HFNC     FIO2 35 %      Flow Rate 40.0 lpm      Ventilator Mode NA     Collected by [id number]     Comment: Meter: [id number]     :  [id number]       D-dimer, Quantitative [[id number]]  (Abnormal) Collected:  11/15/19 1355    Specimen:  Blood Updated:  11/15/19 1501     D-Dimer, Quantitative 1.81 mg/L (FEU)     Narrative:       Reference Range is 0-0.50 mg/L FEU. However, results <0.50 mg/L FEU tends to rule out DVT or PE. Results >0.50 mg/L FEU are not useful in predicting absence or presence of DVT or PE.    Manual Differential [[id number]]  (Abnormal) Collected:  11/15/19 1355    Specimen:  Blood Updated:  11/15/19 1443     Neutrophil % 72.7 %      Lymphocyte % 8.1 %      Monocyte % 4.0 %      Eosinophil % 12.1 %      Basophil % 2.0 %      Atypical Lymphocyte % 1.0 %      Neutrophils Absolute 4.78 10*3/mm3      Lymphocytes Absolute 0.53 10*3/mm3      Monocytes Absolute 0.26 10*3/mm3      Eosinophils Absolute 0.79 10*3/mm3      Basophils Absolute 0.13 10*3/mm3      nRBC 4.0 /100 WBC      Anisocytosis Slight/1+     Hypochromia Mod/2+     Microcytes Slight/1+     Poikilocytes Slight/1+     Polychromasia Slight/1+     WBC Morphology Normal     Giant Platelets Large/3+    CBC Auto Differential [[id number]]  (Abnormal) Collected:  11/15/19 1355    Specimen:  Blood Updated:  11/15/19 1443     WBC 6.57 10*3/mm3      RBC 4.34 10*6/mm3      Hemoglobin 8.5 g/dL      Hematocrit 32.4 %      MCV 74.7 fL      MCH 19.6 pg      MCHC 26.2 g/dL      RDW 20.5 %      RDW-SD 54.5 fl      MPV 9.5 fL      Platelets 372 10*3/mm3      Comprehensive Metabolic Panel [[id number]]  (Abnormal) Collected:  11/15/19 1355    Specimen:  Blood Updated:  11/15/19 1439     Glucose 128 mg/dL      BUN 14 mg/dL      Creatinine 1.06 mg/dL      Sodium 144 mmol/L      Potassium 3.8 mmol/L      Chloride 103 mmol/L      CO2 33.0 mmol/L      Calcium 9.0 mg/dL      Total Protein 7.6 g/dL      Albumin 3.70 g/dL      ALT (SGPT) 17 U/L      AST (SGOT) 27 U/L      Alkaline Phosphatase 145 U/L      Total Bilirubin 0.6 mg/dL      eGFR Non African Amer 68 mL/min/1.73      Globulin 3.9 gm/dL      A/G Ratio 0.9 g/dL      BUN/Creatinine Ratio 13.2     Anion Gap 8.0 mmol/L     Narrative:       GFR Normal >60  Chronic Kidney Disease <60  Kidney Failure <15    BNP [[id number]]  (Normal) Collected:  11/15/19 1355    Specimen:  Blood Updated:  11/15/19 1435     proBNP 1,550.0 pg/mL     Troponin [[id number]]  (Normal) Collected:  11/15/19 1355    Specimen:  Blood Updated:  11/15/19 1435     Troponin T 0.012 ng/mL     Magnesium [[id number]]  (Normal) Collected:  11/15/19 1355    Specimen:  Blood Updated:  11/15/19 1435     Magnesium 2.0 mg/dL     Lactic Acid, Plasma [[id number]]  (Normal) Collected:  11/15/19 1355    Specimen:  Blood Updated:  11/15/19 1431     Lactate 1.1 mmol/L     Blood Culture - Blood, Arm, Left [[id number]] Collected:  11/15/19 1355    Specimen:  Blood from Arm, Left Updated:  11/15/19 1424    Blood Culture - Blood, Hand, Right [[id number]] Collected:  11/15/19 1359    Specimen:  Blood from Hand, Right Updated:  11/15/19 1424    Blood Gas, Arterial [[id number]]  (Abnormal) Collected:  11/15/19 1356    Specimen:  Arterial Blood Updated:  11/15/19 1401     Site Left Radial     Dov's Test Positive     pH, Arterial 7.355 pH units      pCO2, Arterial 57.5 mm Hg      Comment: 83 Value above reference range        pO2, Arterial 45.9 mm Hg      Comment: 85 Value below critical limit        HCO3, Arterial 32.1 mmol/L      Comment: 83 Value above reference range        Base  Excess, Arterial 5.7 mmol/L      Comment: 83 Value above reference range        O2 Saturation, Arterial 80.0 %      Comment: 84 Value below reference range        Temperature 37.0 C      Barometric Pressure for Blood Gas 759 mmHg      Modality Room Air     Ventilator Mode NA     Notified Who DR YOU     Notified By [id number]     Notified Time 11/15/2019 14:01     Collected by [id number]     Comment: Meter: [id number]     :  [id number]           Imaging Results (Last 24 Hours)     Procedure Component Value Units Date/Time    XR Chest 1 View [[id number]] Collected:  11/15/19 1413     Updated:  11/15/19 1417    Narrative:       Frontal upright radiograph of the chest 11/15/2019 2:09 PM CST     COMPARISON: 10/30/2019.     HISTORY: Short of breath.     FINDINGS:   Hyperinflation flattening diaphragms noted consistent with COPD.. The  cardiac silhouettes mildly enlarged but unchanged..      The osseous structures and surrounding soft tissues demonstrate no acute  abnormality.       Impression:       1. COPD no acute cardiopulmonary process.        This report was finalized on 11/15/2019 14:14 by Dr. Kailash Crouch MD.            I have personally reviewed and interpreted the radiology studies and ECG obtained at time of admission.     Assessment / Plan     Assessment:   Active Hospital Problems    Diagnosis   • **Acute respiratory failure with hypoxia and hypercapnia (CMS/HCC)   • Hypertension   • Anemia       Plan:   1.  Admit as inpatient critical care-we will have to house overnight in the emergency department due to bed availability  2.  Per discussion Dr. Lamb, will proceed with intubation, consult pulmonary  3.  Duo merlyn, RT to initiate breathing treatment protocol,  4.  N.p.o. for now  5.  Labs in a.m.  6.  DVT prophylaxis with Lovenox    I discussed the patient's findings and my recommendations with: Maximino Lamb DO  Time spent: 45 minutes      Charmaine Yao, APRN  11/15/19   5:22  PM    Electronically signed by Maximino Lamb DO at 11/15/19 8695

## 2019-11-21 ENCOUNTER — APPOINTMENT (OUTPATIENT)
Dept: ULTRASOUND IMAGING | Facility: HOSPITAL | Age: 79
End: 2019-11-21

## 2019-11-21 LAB
ANION GAP SERPL CALCULATED.3IONS-SCNC: 7 MMOL/L (ref 5–15)
BUN BLD-MCNC: 25 MG/DL (ref 8–23)
BUN/CREAT SERPL: 21.7 (ref 7–25)
CALCIUM SPEC-SCNC: 9.2 MG/DL (ref 8.6–10.5)
CHLORIDE SERPL-SCNC: 98 MMOL/L (ref 98–107)
CO2 SERPL-SCNC: 37 MMOL/L (ref 22–29)
CREAT BLD-MCNC: 1.15 MG/DL (ref 0.76–1.27)
GFR SERPL CREATININE-BSD FRML MDRD: 62 ML/MIN/1.73
GLUCOSE BLD-MCNC: 110 MG/DL (ref 65–99)
POTASSIUM BLD-SCNC: 4 MMOL/L (ref 3.5–5.2)
SODIUM BLD-SCNC: 142 MMOL/L (ref 136–145)

## 2019-11-21 PROCEDURE — 99232 SBSQ HOSP IP/OBS MODERATE 35: CPT | Performed by: INTERNAL MEDICINE

## 2019-11-21 PROCEDURE — 25010000002 ENOXAPARIN PER 10 MG: Performed by: NURSE PRACTITIONER

## 2019-11-21 PROCEDURE — 94760 N-INVAS EAR/PLS OXIMETRY 1: CPT

## 2019-11-21 PROCEDURE — 93970 EXTREMITY STUDY: CPT | Performed by: SURGERY

## 2019-11-21 PROCEDURE — 25010000002 CEFTRIAXONE PER 250 MG: Performed by: NURSE PRACTITIONER

## 2019-11-21 PROCEDURE — 94762 N-INVAS EAR/PLS OXIMTRY CONT: CPT

## 2019-11-21 PROCEDURE — 25010000002 IRON SUCROSE PER 1 MG: Performed by: INTERNAL MEDICINE

## 2019-11-21 PROCEDURE — 97116 GAIT TRAINING THERAPY: CPT

## 2019-11-21 PROCEDURE — 25010000002 FUROSEMIDE PER 20 MG: Performed by: INTERNAL MEDICINE

## 2019-11-21 PROCEDURE — 94799 UNLISTED PULMONARY SVC/PX: CPT

## 2019-11-21 PROCEDURE — 25010000002 METHYLPREDNISOLONE PER 125 MG: Performed by: NURSE PRACTITIONER

## 2019-11-21 PROCEDURE — 63710000001 PREDNISONE PER 1 MG: Performed by: NURSE PRACTITIONER

## 2019-11-21 PROCEDURE — 93970 EXTREMITY STUDY: CPT

## 2019-11-21 PROCEDURE — 80048 BASIC METABOLIC PNL TOTAL CA: CPT | Performed by: INTERNAL MEDICINE

## 2019-11-21 RX ORDER — METHYLPREDNISOLONE SODIUM SUCCINATE 125 MG/2ML
80 INJECTION, POWDER, LYOPHILIZED, FOR SOLUTION INTRAMUSCULAR; INTRAVENOUS ONCE
Status: COMPLETED | OUTPATIENT
Start: 2019-11-21 | End: 2019-11-21

## 2019-11-21 RX ADMIN — IPRATROPIUM BROMIDE AND ALBUTEROL SULFATE 3 ML: 2.5; .5 SOLUTION RESPIRATORY (INHALATION) at 06:27

## 2019-11-21 RX ADMIN — FUROSEMIDE 40 MG: 10 INJECTION, SOLUTION INTRAVENOUS at 20:48

## 2019-11-21 RX ADMIN — METHYLPREDNISOLONE SODIUM SUCCINATE 80 MG: 125 INJECTION, POWDER, FOR SOLUTION INTRAMUSCULAR; INTRAVENOUS at 12:27

## 2019-11-21 RX ADMIN — HYDROXYZINE HYDROCHLORIDE 50 MG: 25 TABLET ORAL at 22:20

## 2019-11-21 RX ADMIN — ENOXAPARIN SODIUM 40 MG: 40 INJECTION SUBCUTANEOUS at 17:36

## 2019-11-21 RX ADMIN — SODIUM CHLORIDE, PRESERVATIVE FREE 10 ML: 5 INJECTION INTRAVENOUS at 20:48

## 2019-11-21 RX ADMIN — POLYETHYLENE GLYCOL 3350 17 G: 17 POWDER, FOR SOLUTION ORAL at 09:14

## 2019-11-21 RX ADMIN — HYDROCORTISONE ACETATE 25 MG: 25 SUPPOSITORY RECTAL at 22:21

## 2019-11-21 RX ADMIN — BUDESONIDE AND FORMOTEROL FUMARATE DIHYDRATE 2 PUFF: 160; 4.5 AEROSOL RESPIRATORY (INHALATION) at 19:52

## 2019-11-21 RX ADMIN — LISINOPRIL 10 MG: 10 TABLET ORAL at 09:14

## 2019-11-21 RX ADMIN — BUDESONIDE AND FORMOTEROL FUMARATE DIHYDRATE 2 PUFF: 160; 4.5 AEROSOL RESPIRATORY (INHALATION) at 06:38

## 2019-11-21 RX ADMIN — IPRATROPIUM BROMIDE AND ALBUTEROL SULFATE 3 ML: 2.5; .5 SOLUTION RESPIRATORY (INHALATION) at 11:54

## 2019-11-21 RX ADMIN — GUAIFENESIN 1200 MG: 600 TABLET, EXTENDED RELEASE ORAL at 09:14

## 2019-11-21 RX ADMIN — SODIUM CHLORIDE, PRESERVATIVE FREE 10 ML: 5 INJECTION INTRAVENOUS at 09:14

## 2019-11-21 RX ADMIN — GUAIFENESIN 1200 MG: 600 TABLET, EXTENDED RELEASE ORAL at 20:49

## 2019-11-21 RX ADMIN — METOPROLOL TARTRATE 12.5 MG: 25 TABLET, FILM COATED ORAL at 10:44

## 2019-11-21 RX ADMIN — FAMOTIDINE 20 MG: 20 TABLET, FILM COATED ORAL at 17:36

## 2019-11-21 RX ADMIN — TEMAZEPAM 15 MG: 15 CAPSULE ORAL at 23:36

## 2019-11-21 RX ADMIN — IPRATROPIUM BROMIDE AND ALBUTEROL SULFATE 3 ML: 2.5; .5 SOLUTION RESPIRATORY (INHALATION) at 19:51

## 2019-11-21 RX ADMIN — HYDROCODONE BITARTRATE AND ACETAMINOPHEN 1 TABLET: 5; 325 TABLET ORAL at 20:48

## 2019-11-21 RX ADMIN — NICOTINE 1 PATCH: 21 PATCH, EXTENDED RELEASE TRANSDERMAL at 20:49

## 2019-11-21 RX ADMIN — FUROSEMIDE 40 MG: 10 INJECTION, SOLUTION INTRAVENOUS at 09:14

## 2019-11-21 RX ADMIN — FAMOTIDINE 20 MG: 20 TABLET, FILM COATED ORAL at 09:14

## 2019-11-21 RX ADMIN — CEFTRIAXONE SODIUM 1 G: 1 INJECTION, POWDER, FOR SOLUTION INTRAMUSCULAR; INTRAVENOUS at 09:14

## 2019-11-21 RX ADMIN — PREDNISONE 20 MG: 20 TABLET ORAL at 09:14

## 2019-11-21 RX ADMIN — HYDROCORTISONE ACETATE 25 MG: 25 SUPPOSITORY RECTAL at 09:14

## 2019-11-21 RX ADMIN — IRON SUCROSE 100 MG: 20 INJECTION, SOLUTION INTRAVENOUS at 10:44

## 2019-11-21 RX ADMIN — METOPROLOL TARTRATE 12.5 MG: 25 TABLET, FILM COATED ORAL at 22:21

## 2019-11-21 NOTE — PROGRESS NOTES
Continued Stay Note   [facility]     Patient Name: Rodrigo Javed  MRN: [id number]  Today's Date: 11/21/2019    Admit Date: 11/15/2019    Discharge Plan     Row Name 11/21/19 1543       Plan    Plan  [facility]     Patient/Family in Agreement with Plan  yes    Plan Comments  Shannan called back from [facility] and they are offering pt a bed there, pt can be discharged there when medically ready. [phone].        Discharge Codes    No documentation.             MELODY Phelps

## 2019-11-21 NOTE — DISCHARGE PLACEMENT REQUEST
"Antonieta Whitfield [phone]  Yuli Christian (79 y.o. Male)     Date of Birth Social Security Number Address Home Phone MRN    [id number]  [id number] [facility] [address] [phone] [id number]    [place] Marital Status          Confucianism Single       Admission Date Admission Type Admitting Provider Attending Provider Department, Room/Bed    11/15/19 Emergency Delvin Lopez DO Hancock, John C, DO [address], 473/1    Discharge Date Discharge Disposition Discharge Destination                       Attending Provider:  Delvin Lopez DO    Allergies:  Tetracycline    Isolation:  None   Infection:  None   Code Status:  CPR    Ht:  162.6 cm (64.02\")   Wt:  87.9 kg (193 lb 12.8 oz)    Admission Cmt:  None   Principal Problem:  Acute respiratory failure with hypoxia and hypercapnia (CMS/HCC) [J96.01,J96.02]                 Active Insurance as of 11/15/2019     Primary Coverage     Payor Plan Insurance Group Employer/Plan Group    MEDICARE MEDICARE A & B      Payor Plan Address Payor Plan Phone Number Payor Plan Fax Number Effective Dates    [address] [phone]  11/1/2007 - None Entered    [place] [address]       Subscriber Name Subscriber Birth Date Member ID       YULI CHRISTIAN 1940 [id number]                 Emergency Contacts      (Rel.) Home Phone Work Phone Mobile Phone    Jose Alejandro Leonardo (Daughter) [phone] -- --    HARDY FRANCOIS (Friend) [phone] -- --    LUIS FRANCOIS (Friend) [phone] -- --            Hospital Medications (active)       Dose Frequency Start End    acetaminophen (TYLENOL) tablet 650 mg 650 mg Every 6 Hours PRN 11/18/2019     Sig - Route: Take 2 tablets by mouth Every 6 (Six) Hours As Needed for Mild Pain . - Oral    aluminum-magnesium hydroxide-simethicone (MAALOX MAX) 400-400-40 MG/5ML suspension 15 mL 15 mL Every 6 Hours PRN 11/20/2019     Sig - Route: Take 15 mL by mouth Every 6 (Six) Hours As Needed for " Indigestion or Heartburn. - Oral    bisacodyl (DULCOLAX) EC tablet 10 mg 10 mg Daily PRN 11/18/2019     Sig - Route: Take 2 tablets by mouth Daily As Needed for Constipation. - Oral    budesonide-formoterol (SYMBICORT) 160-4.5 MCG/ACT inhaler 2 puff 2 puff 2 Times Daily - RT 11/18/2019     Sig - Route: Inhale 2 puffs 2 (Two) Times a Day. - Inhalation    cefTRIAXone (ROCEPHIN) 1 g/100 mL 0.9% NS (MBP) 1 g Daily 11/15/2019 11/22/2019    Sig - Route: Infuse 100 mL into a venous catheter Daily. - Intravenous    enoxaparin (LOVENOX) syringe 40 mg 40 mg Every 24 Hours 11/15/2019     Sig - Route: Inject 0.4 mL under the skin into the appropriate area as directed Daily. - Subcutaneous    famotidine (PEPCID) tablet 20 mg 20 mg 2 Times Daily Before Meals 11/20/2019     Sig - Route: Take 1 tablet by mouth 2 (Two) Times a Day Before Meals. - Oral    furosemide (LASIX) injection 40 mg 40 mg Every 12 Hours 11/20/2019     Sig - Route: Infuse 4 mL into a venous catheter Every 12 (Twelve) Hours. - Intravenous    guaiFENesin (MUCINEX) 12 hr tablet 1,200 mg 1,200 mg Every 12 Hours Scheduled 11/18/2019     Sig - Route: Take 2 tablets by mouth Every 12 (Twelve) Hours. - Oral    HYDROcodone-acetaminophen (NORCO) 5-325 MG per tablet 1 tablet 1 tablet Every 6 Hours PRN 11/19/2019 11/29/2019    Sig - Route: Take 1 tablet by mouth Every 6 (Six) Hours As Needed for Moderate Pain . - Oral    hydrocortisone (ANUSOL-HC) suppository 25 mg 25 mg 2 Times Daily 11/20/2019     Sig - Route: Insert 1 suppository into the rectum 2 (Two) Times a Day. - Rectal    hydrOXYzine (ATARAX) tablet 50 mg 50 mg Nightly PRN 11/19/2019     Sig - Route: Take 2 tablets by mouth At Night As Needed for Sleep. - Oral    ipratropium-albuterol (DUO-NEB) nebulizer solution 3 mL 3 mL Every 6 Hours - RT 11/15/2019     Sig - Route: Take 3 mL by nebulization Every 6 (Six) Hours. - Nebulization    iron sucrose (VENOFER) 100 mg in sodium chloride 0.9 % 100 mL IVPB 100 mg Every 24  "Hours 11/19/2019 11/22/2019    Sig - Route: Infuse 100 mg into a venous catheter Daily. - Intravenous    lisinopril (PRINIVIL,ZESTRIL) tablet 10 mg 10 mg Every 24 Hours Scheduled 11/21/2019     Sig - Route: Take 1 tablet by mouth Daily. - Oral    metoprolol tartrate (LOPRESSOR) tablet 12.5 mg 12.5 mg Every 12 Hours Scheduled 11/20/2019     Sig - Route: Take 0.5 tablets by mouth Every 12 (Twelve) Hours. - Oral    nicotine (NICODERM CQ) 21 MG/24HR patch 1 patch 1 patch Nightly 11/17/2019     Sig - Route: Place 1 patch on the skin as directed by provider Every Night. - Transdermal    ondansetron (ZOFRAN) injection 4 mg 4 mg Every 6 Hours PRN 11/15/2019     Sig - Route: Infuse 2 mL into a venous catheter Every 6 (Six) Hours As Needed for Nausea or Vomiting. - Intravenous    Linked Group 1:  \"Or\" Linked Group Details        ondansetron (ZOFRAN) tablet 4 mg 4 mg Every 6 Hours PRN 11/15/2019     Sig - Route: Take 1 tablet by mouth Every 6 (Six) Hours As Needed for Nausea or Vomiting. - Oral    Linked Group 1:  \"Or\" Linked Group Details        polyethylene glycol 3350 powder (packet) 17 g Daily 11/18/2019     Sig - Route: Take 17 g by mouth Daily. - Oral    predniSONE (DELTASONE) tablet 20 mg 20 mg Daily 11/19/2019     Sig - Route: Take 1 tablet by mouth Daily. - Oral    sodium chloride 0.9 % flush 10 mL 10 mL As Needed 11/15/2019     Sig - Route: Infuse 10 mL into a venous catheter As Needed for Line Care. - Intravenous    Linked Group 2:  \"And\" Linked Group Details        sodium chloride 0.9 % flush 10 mL 10 mL Every 12 Hours Scheduled 11/15/2019     Sig - Route: Infuse 10 mL into a venous catheter Every 12 (Twelve) Hours. - Intravenous    sodium chloride 0.9 % flush 10 mL 10 mL As Needed 11/15/2019     Sig - Route: Infuse 10 mL into a venous catheter As Needed for Line Care. - Intravenous    temazepam (RESTORIL) capsule 15 mg 15 mg Nightly PRN 11/20/2019 11/30/2019    Sig - Route: Take 1 capsule by mouth At Night As " Needed for Sleep. - Oral    furosemide (LASIX) injection 40 mg (Discontinued) 40 mg Daily 11/19/2019 11/20/2019    Sig - Route: Infuse 4 mL into a venous catheter Daily. - Intravenous    lisinopril (PRINIVIL,ZESTRIL) tablet 5 mg (Discontinued) 5 mg Every 24 Hours Scheduled 11/19/2019 11/20/2019    Sig - Route: Take 1 tablet by mouth Daily. - Oral             Physician Progress Notes (last 24 hours) (Notes from 11/20/19 0932 through 11/21/19 0932)      Delvin Lopez,  at 11/20/19 1057              [facility] Medicine Services  INPATIENT PROGRESS NOTE    Patient Name: Rodrigo Javed  Date of Admission: 11/15/2019  Today's Date: 11/20/19  Length of Stay: 5  Primary Care Physician: Delvin Caldera MD    Subjective   Chief Complaint: Shortness of breath.   HPI   He continues to complain of shortness of breath.  His oxygen has been weaned to 2 L.  He feels that his chest congestion is breaking up.  He complains of swollen legs still.  He needs to have his diuretics increased.  He is also been hypertensive and needs to have his antihypertensives addressed.    He did have a bowel movement.    He would like something to help him sleep at night.    Review of Systems   All pertinent negatives and positives are as above. All other systems have been reviewed and are negative unless otherwise stated.     Objective    Temp:  [97.9 °F (36.6 °C)-98.5 °F (36.9 °C)] 98.1 °F (36.7 °C)  Heart Rate:  [75-94] 86  Resp:  [16-18] 16  BP: (129-175)/(67-90) 175/78  Physical Exam  Constitutional: He is oriented to person, place, and time. He appears well-developed and well-nourished. Up in the chair.  No distress.  No family present. Discussed with his nurse, Ayleen.    Head: Normocephalic and atraumatic.   Eyes: Conjunctivae and EOM are normal. Pupils are equal, round, and reactive to light.   Neck: Neck supple. No JVD present.   Cardiovascular: Normal rate, regular rhythm, normal heart sounds and intact  distal pulses. Exam reveals no gallop and no friction rub.   No murmur heard.  Pulmonary/Chest: Effort normal. No respiratory distress. He has wheezes. He has no rales. He exhibits no tenderness. 2L.   Abdominal: Soft. Bowel sounds are normal. He exhibits distension. There is no tenderness. There is no rebound and no guarding.   Musculoskeletal: Normal range of motion. He exhibits no edema, tenderness or deformity. 1+ LE edema and some scrotal edema.    Neurological: He is alert and oriented to person, place, and time. He displays normal reflexes. No cranial nerve deficit. He exhibits normal muscle tone.   Skin: Skin is warm and dry. No rash noted.   Psychiatric: He has a normal mood and affect. His behavior is normal. Judgment and thought content normal.     Intake/Output Summary (Last 24 hours) at 11/20/2019 1058  Last data filed at 11/20/2019 1017  Gross per 24 hour   Intake 820 ml   Output 4575 ml   Net -3755 ml     Results Review:  I have reviewed the labs, radiology results, and diagnostic studies.    Laboratory Data:   Results from last 7 days   Lab Units 11/20/19  0613 11/18/19  0545 11/17/19  0241   WBC 10*3/mm3 10.53 8.35 5.22   HEMOGLOBIN g/dL 9.8* 8.4* 7.5*   HEMATOCRIT % 36.6* 31.0* 27.6*   PLATELETS 10*3/mm3 399 333 305      Results from last 7 days   Lab Units 11/20/19  0613 11/17/19  0257 11/17/19  0241 11/16/19  0704 11/15/19  1355   SODIUM mmol/L 140  --  141 142 144   SODIUM, ARTERIAL mmol/L  --  143  --   --   --    POTASSIUM mmol/L 4.0  --  4.4 4.3 3.8   CHLORIDE mmol/L 100  --  105 104 103   CO2 mmol/L 36.0*  --  26.0 30.0* 33.0*   BUN mg/dL 21  --  19 16 14   CREATININE mg/dL 0.92  --  0.99 1.03 1.06   CALCIUM mg/dL 9.3  --  8.4* 8.5* 9.0   BILIRUBIN mg/dL  --   --   --  0.6 0.6   ALK PHOS U/L  --   --   --  119* 145*   ALT (SGPT) U/L  --   --   --  13 17   AST (SGOT) U/L  --   --   --  23 27   GLUCOSE mg/dL 88  --  199* 171* 128*     Culture Data:   Blood Culture   Date Value Ref Range Status    11/15/2019 No growth at 4 days  Preliminary   11/15/2019 No growth at 4 days  Preliminary     Respiratory Culture   Date Value Ref Range Status   11/16/2019 Light growth (2+) Acinetobacter baumannii (A)  Final   11/16/2019 Rare Normal Respiratory Char  Final     I have reviewed the patient's current medications.     Assessment/Plan     Active Hospital Problems    Diagnosis   • **Acute respiratory failure with hypoxia and hypercapnia (CMS/HCC)   • COPD with acute exacerbation (CMS/HCC)   • Pulmonary nodule   • Acute diastolic heart failure (CMS/HCC)   • Microcytic anemia   • Hyperglycemia   • Alcoholism /alcohol abuse (CMS/HCC)   • Chronic constipation   • Essential hypertension     Plan:  The patient was originally admitted on 11/15 by Charmaine Yao and Dr. Lamb.  He presented to the emergency department with shortness of breath.  He was felt to be having an exacerbation of chronic obstructive pulmonary disease secondary to long-standing and ongoing tobacco abuse.  His carbon dioxide retention and pH continued to worsen.  Trials of BiPAP were unsuccessful at controlling this.  He ultimately required intubation mechanical ventilation.  He was extubated on 11/17.     He is being followed by the pulmonary service.     Continue Rocephin.  He has grown pan susceptible Acinetobacter on sputum culture.  He does not have a clear pneumonia.  This is likely colonization.  Mucinex and incentive spirometry. Continue inhaled bronchodilators. Started Symbicort on 11/18. Weaned Solu-Medrol to oral prednisone on 11/19.     Counseled for tobacco cessation.  Nicotine patch offered.     He will need a repeat noncontrasted CT scan of the chest in 6 months to reevaluate his left upper lobe pulmonary nodule.    Dr. Ramey is repeating a PA lateral chest x-ray today.     He has hypertension.  He was not currently on an agent for this. Start lisinopril  on 11/19 and will uptitrate.   Add metoprolol tartrate.  He has some diastolic  dysfunction on echocardiogram.  His BNP was normal at presentation.  He has had some complaints of scrotal edema and now has worsening lower extremity edema. Lasix IV BID.       He is hyperglycemic secondary to steroids. Hemoglobin A1c 6.0.      The patient appears to have a chronic microcytic anemia.  He has been transfused 1 unit packed red blood cells on 11/17.  Anemia substrates were not assessed prior to the administration of blood products, but I was able to add them to old blood.  He is significantly iron deficient.  IV Venofer.  Dr. Caldera had him set up to have an outpatient colonoscopy on 11/18, however, he was obviously admitted.  This will need to be rescheduled as an outpatient for further evaluation of his chronic microcytic, iron deficient anemia.     B12 normal.     Bowel regimen.      Lovenox for DVT prophylaxis.     Pepcid for peptic ulcer prophylaxis.     Increase activity.     Discharge Planning: I expect the patient to be discharged to home in 1-3 days.    Delvin Lopez DO   11/20/19   10:57 AM      Electronically signed by Delvin Lopez DO at 11/20/19 1107       Consult Notes (last 24 hours) (Notes from 11/20/19 0932 through 11/21/19 0932)     No notes of this type exist for this encounter.        Nutrition Notes (last 24 hours) (Notes from 11/20/19 0932 through 11/21/19 0932)     No notes of this type exist for this encounter.           Physical Therapy Notes (last 24 hours) (Notes from 11/20/19 0933 through 11/21/19 0933)      Radha Lyons, AMMON at 11/20/19 1015  Version 1 of 1         Problem: Patient Care Overview  Goal: Plan of Care Review  Outcome: Ongoing (interventions implemented as appropriate)   11/20/19 1013   Coping/Psychosocial   Plan of Care Reviewed With patient   Plan of Care Review   Progress improving   OTHER   Outcome Summary Pt sitting up in chair. 3L o2 98%. sit-stand sba Pt amb 100'x2 rwx sba had unsteady gait at times but self correction of any LOB. discussed w/  pt home safety and POC. Pt able to go up/down 5 steps sba cues for safety. Monitor o2 amb on RA o2 dropped 87%. Pt would benfefit from  upon d/c may need o2 for home as well           Electronically signed by Radha Lyons PTA at 11/20/19 1015       Occupational Therapy Notes (last 24 hours) (Notes from 11/20/19 0933 through 11/21/19 0933)     No notes of this type exist for this encounter.        Speech Language Pathology Notes (last 24 hours) (Notes from 11/20/19 0933 through 11/21/19 0933)     No notes of this type exist for this encounter.        Respiratory Therapy Notes (last 24 hours) (Notes from 11/20/19 0933 through 11/21/19 0933)     No notes of this type exist for this encounter.

## 2019-11-21 NOTE — PROGRESS NOTES
[facility] Medicine Services  INPATIENT PROGRESS NOTE    Patient Name: Rodrigo Javed  Date of Admission: 11/15/2019  Today's Date: 11/21/19  Length of Stay: 6  Primary Care Physician: Delvin Caldera MD    Subjective   Chief Complaint: Pain, stiffness.   HPI   He seems to be responding to Lasix.  His right lower extremity remains more edematous than his left lower extremity.  No calf pain, redness, or warmth.  Would assess for DVT at this point.    He has poor pulmonary stamina.  He gets more dyspneic with exertion.  He states that he is very sore today from being in the bed.      Review of Systems   All pertinent negatives and positives are as above. All other systems have been reviewed and are negative unless otherwise stated.     Objective    Temp:  [97.9 °F (36.6 °C)-98.6 °F (37 °C)] 97.9 °F (36.6 °C)  Heart Rate:  [76-96] 76  Resp:  [16-18] 16  BP: (122-149)/(62-71) 124/62  Physical Exam  Constitutional: He is oriented to person, place, and time. He appears well-developed and well-nourished. Up in the bed.  No distress.  No family present. Discussed with his nurse, Lauren.    Head: Normocephalic and atraumatic.   Eyes: Conjunctivae and EOM are normal. Pupils are equal, round, and reactive to light.   Neck: Neck supple. No JVD present.   Cardiovascular: Normal rate, regular rhythm, normal heart sounds and intact distal pulses. Exam reveals no gallop and no friction rub.   No murmur heard.  Pulmonary/Chest: Effort normal. No respiratory distress. He has wheezes. He has no rales. He exhibits no tenderness. 2L.   Abdominal: Soft. Bowel sounds are normal. He exhibits distension. There is no tenderness. There is no rebound and no guarding.   Musculoskeletal: Normal range of motion. He exhibits no edema, tenderness or deformity. 1+ LE edema (now R>L) and with improving scrotal edema.    Neurological: He is alert and oriented to person, place, and time. He displays normal reflexes.  No cranial nerve deficit. He exhibits normal muscle tone.   Skin: Skin is warm and dry. No rash noted.   Psychiatric: He has a normal mood and affect. His behavior is normal. Judgment and thought content normal.     Results Review:  I have reviewed the labs, radiology results, and diagnostic studies.    Laboratory Data:   Results from last 7 days   Lab Units 11/21/19  0515 11/20/19  0613 11/17/19  0257 11/17/19  0241 11/16/19  0704 11/15/19  1355   SODIUM mmol/L 142 140  --  141 142 144   SODIUM, ARTERIAL mmol/L  --   --  143  --   --   --    POTASSIUM mmol/L 4.0 4.0  --  4.4 4.3 3.8   CHLORIDE mmol/L 98 100  --  105 104 103   CO2 mmol/L 37.0* 36.0*  --  26.0 30.0* 33.0*   BUN mg/dL 25* 21  --  19 16 14   CREATININE mg/dL 1.15 0.92  --  0.99 1.03 1.06   CALCIUM mg/dL 9.2 9.3  --  8.4* 8.5* 9.0   BILIRUBIN mg/dL  --   --   --   --  0.6 0.6   ALK PHOS U/L  --   --   --   --  119* 145*   ALT (SGPT) U/L  --   --   --   --  13 17   AST (SGOT) U/L  --   --   --   --  23 27   GLUCOSE mg/dL 110* 88  --  199* 171* 128*     Culture Data:   Blood Culture   Date Value Ref Range Status   11/15/2019 No growth at 5 days  Final   11/15/2019 No growth at 5 days  Final     Respiratory Culture   Date Value Ref Range Status   11/16/2019 Light growth (2+) Acinetobacter baumannii (A)  Final   11/16/2019 Rare Normal Respiratory Char  Final     Radiology Data:   Imaging Results (Last 24 Hours)     Procedure Component Value Units Date/Time    XR Chest 2 View [[id number]] Collected:  11/20/19 1455     Updated:  11/20/19 1500    Narrative:       XR CHEST 2 VW- 11/20/2019 1:46 PM CST     HISTORY: worsening SOA, increased cough, abnormal sputum culture;  J96.01-Acute respiratory failure with hypoxia; J96.02-Acute respiratory  failure with hypercapnia; R26.9-Unspecified abnormalities of gait and  mobility       COMPARISON: Chest exam dated 11/17/2019.     FINDINGS:   Upright frontal and lateral radiographs of the chest were obtained.     Status  post extubation. Minimal patchy densities overlying the  hemidiaphragms, thought to reflect a basilar atelectasis. There appear  to be bilateral trace layering effusions as well. Hyperexpanded lung  volumes with flattening of the hemidiaphragms, suggesting underlying  emphysema. The heart size is normal. The pulmonary vasculature are  nondilated. No acute bony abnormality.       Impression:       1. There appears to be mild bibasilar atelectasis with additional trace  layering pleural effusions. No obvious inflammatory infiltrate.     This report was finalized on 11/20/2019 14:57 by Dr Alexei Vanessa, .        I have reviewed the patient's current medications.     Assessment/Plan     Active Hospital Problems    Diagnosis   • **Acute respiratory failure with hypoxia and hypercapnia (CMS/HCC)   • COPD with acute exacerbation (CMS/HCC)   • Pulmonary nodule   • Acute diastolic heart failure (CMS/HCC)   • Microcytic anemia   • Hyperglycemia   • Alcoholism /alcohol abuse (CMS/HCC)   • Chronic constipation   • Essential hypertension     Plan:  The patient was originally admitted on 11/15 by Charmaine Yao and Dr. Lamb.  He presented to the emergency department with shortness of breath.  He was felt to be having an exacerbation of chronic obstructive pulmonary disease secondary to long-standing and ongoing tobacco abuse.  His carbon dioxide retention and pH continued to worsen.  Trials of BiPAP were unsuccessful at controlling this.  He ultimately required intubation mechanical ventilation.  He was extubated on 11/17.     He is being followed by the pulmonary service.     Continue Rocephin.  He has grown pan susceptible Acinetobacter on sputum culture.  He does not have a clear pneumonia.  This is likely colonization.  Mucinex and incentive spirometry. Continue inhaled bronchodilators. Started Symbicort on 11/18. Weaned Solu-Medrol to oral prednisone on 11/19.     Counseled for tobacco cessation.  Nicotine patch  offered.     He will need a repeat noncontrasted CT scan of the chest in 6 months to reevaluate his left upper lobe pulmonary nodule.     PA lateral chest x-ray on 11/20 showed mild bibasilar atelectasis with additional trace layering pleural effusions with no obvious inflammatory infiltrate.     Started lisinopril on 11/19 and have uptitrated.   Added metoprolol tartrate on 11/20.  He has some diastolic dysfunction on echocardiogram.  His BNP was normal at presentation, but he has had some complaints of scrotal edema and now has worsening lower extremity edema. Lasix IV BID.       He is hyperglycemic secondary to steroids. Hemoglobin A1c 6.0.      The patient appears to have a chronic microcytic anemia.  He has been transfused 1 unit packed red blood cells on 11/17.  Anemia substrates were not assessed prior to the administration of blood products, but I was able to add them to old blood.  He is significantly iron deficient.  IV Venofer.  Dr. Caldera had him set up to have an outpatient colonoscopy on 11/18, however, he was obviously admitted.  This will need to be rescheduled as an outpatient for further evaluation of his chronic microcytic, iron deficient anemia.     B12 normal.     Bowel regimen.      Lovenox for DVT prophylaxis.     Pepcid for peptic ulcer prophylaxis.     Increase activity.     Discharge Planning: I expect the patient to be discharged to home in 1-2 days.    Delvin Lopez, DO   11/21/19   11:35 AM

## 2019-11-21 NOTE — PLAN OF CARE
Problem: Skin Injury Risk (Adult)  Goal: Skin Health and Integrity  Outcome: Ongoing (interventions implemented as appropriate)      Problem: Fall Risk (Adult)  Goal: Absence of Fall  Outcome: Ongoing (interventions implemented as appropriate)      Problem: Patient Care Overview  Goal: Plan of Care Review  Outcome: Ongoing (interventions implemented as appropriate)   11/21/19 5018   Coping/Psychosocial   Plan of Care Reviewed With patient   Plan of Care Review   Progress improving       Problem: Nutrition, Enteral (Adult)  Goal: Signs and Symptoms of Listed Potential Problems Will be Absent, Minimized or Managed (Nutrition, Enteral)  Outcome: Ongoing (interventions implemented as appropriate)

## 2019-11-21 NOTE — THERAPY TREATMENT NOTE
Acute Care - Physical Therapy Treatment Note  [facility]     Patient Name: Rodrigo Javed  : 1940  MRN: [id number]  Today's Date: 2019             Admit Date: 11/15/2019    Visit Dx:    ICD-10-CM ICD-9-CM   1. Acute respiratory failure with hypoxia and hypercapnia (CMS/HCC) J96.01 518.81    J96.02    2. Gait abnormality R26.9 781.2     Patient Active Problem List   Diagnosis   • Acute respiratory failure with hypoxia and hypercapnia (CMS/HCC)   • Essential hypertension   • Microcytic anemia   • COPD with acute exacerbation (CMS/HCC)   • Acute diastolic heart failure (CMS/HCC)   • Pulmonary nodule   • Hyperglycemia   • Alcoholism /alcohol abuse (CMS/HCC)   • Chronic constipation       Therapy Treatment    Rehabilitation Treatment Summary     Row Name 19 1356 19 0924          Treatment Time/Intention    Discipline  physical therapy assistant  (Pended)   -KM  physical therapy assistant  -NW     Document Type  therapy note (daily note)  (Pended)   -KM  —  (Pended)   -KM     Subjective Information  no complaints  (Pended)   -KM2  —     Mode of Treatment  physical therapy  (Pended)   -KM2  —     Patient/Family Observations  Sister present  (Pended)   -KM3  —     Patient Effort  good  (Pended)   -KM2  —     Comment  —  having increased pain and a bad night request to ck bk later  -NW     Reason Treatment Not Performed  —  patient/family declined treatment  -NW     Existing Precautions/Restrictions  fall  (Pended)   -KM2  —     Recorded by [KM] Claudia Martínez PTA Student 19 1357  [KM2] Claudia Martínez PTA Student 19 1434  [KM3] Claudia Martínez PTA Student 19 1436 [KM] Claudia Martínez, AMMON Student 19 0929  [NW] Radha Lyons PTA 19 1105     Row Name 19 1356             Vital Signs    Pre SpO2 (%)  92  (Pended)   -KM      O2 Delivery Pre Treatment  room air  (Pended)   -KM      Intra SpO2 (%)  89  (Pended)   -KM      O2 Delivery Intra Treatment  room air  (Pended)   -KM       Post SpO2 (%)  91  (Pended)   -KM      O2 Delivery Post Treatment  room air  (Pended)   -KM      Pre Patient Position  Sitting  (Pended)   -KM      Intra Patient Position  Standing  (Pended)   -KM      Post Patient Position  Sitting  (Pended)   -KM      Recorded by [KM] Claudia Martínez PTA Student 11/21/19 1434      Row Name 11/21/19 1356             Safety Issues, Functional Mobility    Safety Issues Affecting Function (Mobility)  safety precaution awareness  (Pended)   -KM      Impairments Affecting Function (Mobility)  balance;endurance/activity tolerance  (Pended)   -KM      Recorded by [KM] Claudia Martínez PTA Student 11/21/19 1434      Row Name 11/21/19 1356             Bed Mobility Assessment/Treatment    Comment (Bed Mobility)  Pt sitting up in chair  (Pended)   -KM      Recorded by [KM] Claudia Martínez PTA Student 11/21/19 1434      Row Name 11/21/19 1356             Transfer Assessment/Treatment    Transfer Assessment/Treatment  sit-stand transfer;stand-sit transfer  (Pended)   -KM      Recorded by [KM] Claudia Martínez PTA Student 11/21/19 1434      Row Name 11/21/19 1356             Sit-Stand Transfer    Sit-Stand [place] (Transfers)  stand by assist  (Pended)   -KM      Assistive Device (Sit-Stand Transfers)  walker, front-wheeled  (Pended)   -KM      Recorded by [KM] Claudia Martínez PTA Student 11/21/19 1434      Row Name 11/21/19 1356             Stand-Sit Transfer    Stand-Sit [place] (Transfers)  stand by assist  (Pended)   -KM      Assistive Device (Stand-Sit Transfers)  walker, front-wheeled  (Pended)   -KM      Recorded by [KM] Claudia Martínez PTA Student 11/21/19 1434      Row Name 11/21/19 1356             Gait/Stairs Assessment/Training    Gait/Stairs Assessment/Training  gait/ambulation independence  (Pended)   -KM      [place] Level (Gait)  stand by assist  (Pended)   -KM      Assistive Device (Gait)  walker, front-wheeled  (Pended)   -KM      Distance in Feet (Gait)  60x2  (Pended)    -KM2      Pattern (Gait)  step-through  (Pended)   -KM      Deviations/Abnormal Patterns (Gait)  gait speed decreased;base of support, narrow;stride length decreased  (Pended)   -KM      Bilateral Gait Deviations  heel strike decreased  (Pended)   -KM      Recorded by [KM] Claudia Martínez PTA Student 11/21/19 1434  [KM2] Claudia Martínez PTA Student 11/21/19 1436      Row Name 11/21/19 1356             Positioning and Restraints    Pre-Treatment Position  sitting in chair/recliner  (Pended)   -KM      Post Treatment Position  bed  (Pended)   -KM      In Bed  sitting EOB;call light within reach;encouraged to call for assist  (Pended)   -KM      Recorded by [KM] Claudia Martínez PTA Student 11/21/19 1434      Row Name 11/21/19 1356             Pain Scale: Numbers Pre/Post-Treatment    Pain Scale: Numbers, Pretreatment  0/10 - no pain  (Pended)   -KM      Pain Scale: Numbers, Post-Treatment  0/10 - no pain  (Pended)   -KM      Recorded by [] Claudia Martínez PTA Student 11/21/19 1434        User Key  (r) = Recorded By, (t) = Taken By, (c) = Cosigned By    Initials Name Effective Dates Discipline    NW Radha Lyons PTA 08/02/16 -  PT     Claudia Martínez PTA Student 11/06/19 -  PT                   Physical Therapy Education     Title: PT OT SLP Therapies (Done)     Topic: Physical Therapy (Done)     Point: Mobility training (Done)     Learning Progress Summary           Patient Acceptance, E,BILLIE,NITHIN, KULDEEP HARTLEY,NR by IGNACIA at 11/19/2019  9:36 AM    Comment:  Education re: purpose of PT/importance of activity, proper use of IS, edema mgmt to include LE elevation and freq aps, and safety/falls prevention to include proper placement of hands w/ tfers w/ wx and proper placement of rwx and sequencing                   Point: Home exercise program (Done)     Learning Progress Summary           Patient Acceptance, E,BILLIE,NITHIN, KULDEEP HARTLEY,NR by  at 11/19/2019  9:36 AM    Comment:  Education re: purpose of PT/importance of activity, proper use of IS,  edema mgmt to include LE elevation and freq aps, and safety/falls prevention to include proper placement of hands w/ tfers w/ wx and proper placement of rwx and sequencing                   Point: Precautions (Done)     Learning Progress Summary           Patient Acceptance, E,TB,D, VU,DU,NR by IGNACIA at 11/19/2019  9:36 AM    Comment:  Education re: purpose of PT/importance of activity, proper use of IS, edema mgmt to include LE elevation and freq aps, and safety/falls prevention to include proper placement of hands w/ tfers w/ wx and proper placement of rwx and sequencing                               User Key     Initials Effective Dates Name Provider Type Discipline     08/02/18 -  Jayna Webber, PT Physical Therapist PT                PT Recommendation and Plan     Plan of Care Reviewed With: (P) patient  Progress: (P) improving  Outcome Summary: (P) Pt feeling better this afternoon agreed to therapy. Pt up ad catarino. Pt ambulated 60ftx2 sba. Pt would benefit from ontinued rehab upon discharge.       Time Calculation:   PT Charges     Row Name 11/21/19 1440             Time Calculation    Start Time  1357  (Pended)   -KM      Stop Time  1425  (Pended)   -KM      Time Calculation (min)  28 min  (Pended)   -KM      PT Received On  11/21/19  (Pended)   -KM      PT Goal Re-Cert Due Date  11/29/19  (Pended)   -KM         Time Calculation- PT    Total Timed Code Minutes- PT  28 minute(s)  (Pended)   -KM         Timed Charges    49564 - Gait Training Minutes   28  (Pended)   -KM        User Key  (r) = Recorded By, (t) = Taken By, (c) = Cosigned By    Initials Name Provider Type     Claudia Martínez PTA Student PTA Student        Therapy Charges for Today     Code Description Service Date Service Provider Modifiers Qty    29432748870 HC GAIT TRAINING EA 15 MIN 11/21/2019 Claudia Martínez PTA Student GP 2          PT G-Codes  Outcome Measure Options: AM-PAC 6 Clicks Basic Mobility (PT)  AM-PAC 6 Clicks Score (PT): Lloyd Taylor  AMMON Martínez Student  11/21/2019

## 2019-11-21 NOTE — THERAPY TREATMENT NOTE
Acute Care - Physical Therapy Treatment Note  [facility]     Patient Name: Rodrigo Javed  : 1940  MRN: [id number]  Today's Date: 2019             Admit Date: 11/15/2019    Visit Dx:    ICD-10-CM ICD-9-CM   1. Acute respiratory failure with hypoxia and hypercapnia (CMS/HCC) J96.01 518.81    J96.02    2. Gait abnormality R26.9 781.2     Patient Active Problem List   Diagnosis   • Acute respiratory failure with hypoxia and hypercapnia (CMS/HCC)   • Essential hypertension   • Microcytic anemia   • COPD with acute exacerbation (CMS/HCC)   • Acute diastolic heart failure (CMS/HCC)   • Pulmonary nodule   • Hyperglycemia   • Alcoholism /alcohol abuse (CMS/HCC)   • Chronic constipation       Therapy Treatment    Rehabilitation Treatment Summary     Row Name 19 1356 19 0924          Treatment Time/Intention    Discipline  physical therapy assistant  (Pended)   -KM  physical therapy assistant  -NW     Document Type  therapy note (daily note)  (Pended)   -KM  --  (Pended)   -KM     Subjective Information  no complaints  (Pended)   -KM2  --     Mode of Treatment  physical therapy  (Pended)   -KM2  --     Patient/Family Observations  Sister present  (Pended)   -KM3  --     Patient Effort  good  (Pended)   -KM2  --     Comment  --  having increased pain and a bad night request to ck bk later  -NW     Reason Treatment Not Performed  --  patient/family declined treatment  -NW     Existing Precautions/Restrictions  fall  (Pended)   -KM2  --     Recorded by [KM] Claudia Martínez, PTA Student 19 1357  [KM2] Claudia Martínez, PTA Student 19 1434  [KM3] Claudia Martínez, [facility] Student 19 1436 [KM] Claudia Martínez, PTA Student 19 0929  [NW] Radha Lyons PTA 19 1105     Row Name 19 1356             Vital Signs    Pre SpO2 (%)  92  (Pended)   -KM      O2 Delivery Pre Treatment  room air  (Pended)   -KM      Intra SpO2 (%)  89  (Pended)   -KM      O2 Delivery Intra Treatment  room air   (Pended)   -KM      Post SpO2 (%)  91  (Pended)   -KM      O2 Delivery Post Treatment  room air  (Pended)   -KM      Pre Patient Position  Sitting  (Pended)   -KM      Intra Patient Position  Standing  (Pended)   -KM      Post Patient Position  Sitting  (Pended)   -KM      Recorded by [KM] Claudia Martínez PTA Student 11/21/19 1434      Row Name 11/21/19 1356             Safety Issues, Functional Mobility    Safety Issues Affecting Function (Mobility)  safety precaution awareness  (Pended)   -KM      Impairments Affecting Function (Mobility)  balance;endurance/activity tolerance  (Pended)   -KM      Recorded by [KM] Claudia Martínez PTA Student 11/21/19 1434      Row Name 11/21/19 1356             Bed Mobility Assessment/Treatment    Comment (Bed Mobility)  Pt sitting up in chair  (Pended)   -KM      Recorded by [KM] Claudia Martníez PTA Student 11/21/19 1434      Row Name 11/21/19 1356             Transfer Assessment/Treatment    Transfer Assessment/Treatment  sit-stand transfer;stand-sit transfer  (Pended)   -KM      Recorded by [KM] Claudia Martínez PTA Student 11/21/19 1434      Row Name 11/21/19 1356             Sit-Stand Transfer    Sit-Stand [place] (Transfers)  stand by assist  (Pended)   -KM      Assistive Device (Sit-Stand Transfers)  walker, front-wheeled  (Pended)   -KM      Recorded by [KM] Claudia Martínez PTA Student 11/21/19 1434      Row Name 11/21/19 1356             Stand-Sit Transfer    Stand-Sit [place] (Transfers)  stand by assist  (Pended)   -KM      Assistive Device (Stand-Sit Transfers)  walker, front-wheeled  (Pended)   -KM      Recorded by [KM] Claudia Martínez PTA Student 11/21/19 1434      Row Name 11/21/19 1356             Gait/Stairs Assessment/Training    Gait/Stairs Assessment/Training  gait/ambulation independence  (Pended)   -KM      [place] Level (Gait)  stand by assist  (Pended)   -KM      Assistive Device (Gait)  walker, front-wheeled  (Pended)   -KM      Distance in Feet (Gait)   60x2  (Pended)   -KM2      Pattern (Gait)  step-through  (Pended)   -KM      Deviations/Abnormal Patterns (Gait)  gait speed decreased;base of support, narrow;stride length decreased  (Pended)   -KM      Bilateral Gait Deviations  heel strike decreased  (Pended)   -KM      Recorded by [KM] Claudia Martínez PTA Student 11/21/19 1434  [KM2] Tanya ClaudiaAMMON prince Student 11/21/19 1436      Row Name 11/21/19 1356             Positioning and Restraints    Pre-Treatment Position  sitting in chair/recliner  (Pended)   -KM      Post Treatment Position  bed  (Pended)   -KM      In Bed  sitting EOB;call light within reach;encouraged to call for assist  (Pended)   -KM      Recorded by [] Claudia Martínez PTA Student 11/21/19 1434      Row Name 11/21/19 1356             Pain Scale: Numbers Pre/Post-Treatment    Pain Scale: Numbers, Pretreatment  0/10 - no pain  (Pended)   -KM      Pain Scale: Numbers, Post-Treatment  0/10 - no pain  (Pended)   -KM      Recorded by [] Claudia Martínez PTA Student 11/21/19 1434        User Key  (r) = Recorded By, (t) = Taken By, (c) = Cosigned By    Initials Name Effective Dates Discipline    NW Radha Lyons PTA 08/02/16 -  PT     Claudia Martínez PTA Student 11/06/19 -  PT                   Physical Therapy Education     Title: PT OT SLP Therapies (Done)     Topic: Physical Therapy (Done)     Point: Mobility training (Done)     Learning Progress Summary           Patient Acceptance, E,TB,NITHIN, KULDEEP HARTLEY,NR by IGNACIA at 11/19/2019  9:36 AM    Comment:  Education re: purpose of PT/importance of activity, proper use of IS, edema mgmt to include LE elevation and freq aps, and safety/falls prevention to include proper placement of hands w/ tfers w/ wx and proper placement of rwx and sequencing                   Point: Home exercise program (Done)     Learning Progress Summary           Patient Acceptance, E,BILLIE,NITHIN, ODILIA,KULDEEP,NR by IGNACIA at 11/19/2019  9:36 AM    Comment:  Education re: purpose of PT/importance of activity,  proper use of IS, edema mgmt to include LE elevation and freq aps, and safety/falls prevention to include proper placement of hands w/ tfers w/ wx and proper placement of rwx and sequencing                   Point: Precautions (Done)     Learning Progress Summary           Patient Acceptance, E,TB,D, VU,DU,NR by IGNACIA at 11/19/2019  9:36 AM    Comment:  Education re: purpose of PT/importance of activity, proper use of IS, edema mgmt to include LE elevation and freq aps, and safety/falls prevention to include proper placement of hands w/ tfers w/ wx and proper placement of rwx and sequencing                               User Key     Initials Effective Dates Name Provider Type Discipline     08/02/18 -  Jayna Webber, PT Physical Therapist PT                PT Recommendation and Plan     Plan of Care Reviewed With: (P) patient  Progress: (P) improving  Outcome Summary: (P) Pt feeling better this afternoon agreed to therapy. Pt up ad catarino. Pt ambulated 60ftx2 sba. Pt would benefit from ontinued rehab upon discharge.       Time Calculation:   PT Charges     Row Name 11/21/19 1440             Time Calculation    Start Time  1357  (Pended)   -KM      Stop Time  1425  (Pended)   -KM      Time Calculation (min)  28 min  (Pended)   -KM      PT Received On  11/21/19  (Pended)   -KM      PT Goal Re-Cert Due Date  11/29/19  (Pended)   -KM         Time Calculation- PT    Total Timed Code Minutes- PT  28 minute(s)  (Pended)   -KM         Timed Charges    09733 - Gait Training Minutes   28  (Pended)   -KM        User Key  (r) = Recorded By, (t) = Taken By, (c) = Cosigned By    Initials Name Provider Type    Claudia Gaviria PTA Student PTA Student        Therapy Charges for Today     Code Description Service Date Service Provider Modifiers Qty    43791475018 HC GAIT TRAINING EA 15 MIN 11/21/2019 Claudia Martínez PTA Student GP 2          PT G-Codes  Outcome Measure Options: AM-PAC 6 Clicks Basic Mobility (PT)  AM-PAC 6 Clicks Score  (PT): 18    Claudia Martínez, PTA Student  11/21/2019

## 2019-11-21 NOTE — PLAN OF CARE
Problem: Patient Care Overview  Goal: Plan of Care Review  Outcome: Ongoing (interventions implemented as appropriate)   11/21/19 2812   Coping/Psychosocial   Plan of Care Reviewed With patient   Plan of Care Review   Progress improving   OTHER   Outcome Summary Pt feeling better this afternoon agreed to therapy. Pt ambulated 60ftx2 sba. Pt would benefit from continued rehab upon discharge.

## 2019-11-21 NOTE — PROGRESS NOTES
Continued Stay Note   [facility]     Patient Name: Rodrigo Javed  MRN: [id number]  Today's Date: 11/21/2019    Admit Date: 11/15/2019    Discharge Plan     Row Name 11/21/19 0938       Plan    Plan  Waiting on decision from [facility]    Patient/Family in Agreement with Plan  yes    Plan Comments  Faxed more records to Evelyn Rivas at [facility] per her request.  She is going to look over information and call back decision today.  Will follow [phone] [phone].        Discharge Codes    No documentation.             MELODY Phelps

## 2019-11-21 NOTE — PLAN OF CARE
Problem: Skin Injury Risk (Adult)  Goal: Skin Health and Integrity  Outcome: Ongoing (interventions implemented as appropriate)      Problem: Fall Risk (Adult)  Goal: Absence of Fall  Outcome: Ongoing (interventions implemented as appropriate)      Problem: Patient Care Overview  Goal: Plan of Care Review  Outcome: Ongoing (interventions implemented as appropriate)   11/21/19 0651   Coping/Psychosocial   Plan of Care Reviewed With patient   Plan of Care Review   Progress no change   OTHER   Outcome Summary Restoril given for sleep, Pt became intermittently confused. Frequent checks done, safety maintained. Continue to monitor       Problem: Nutrition, Enteral (Adult)  Goal: Signs and Symptoms of Listed Potential Problems Will be Absent, Minimized or Managed (Nutrition, Enteral)  Outcome: Ongoing (interventions implemented as appropriate)

## 2019-11-21 NOTE — PROGRESS NOTES
PULMONARY AND CRITICAL CARE PROGRESS NOTE - [facility]    Patient: Rodrigo Javed    1940    MR# [id number]    Acct# [id number]  11/21/19   10:12 AM  Referring Provider: Delvin Lopez DO    Chief Complaint: Mechanically ventilated    Interval history: He is sitting on side of bed. He has been up all night using the restroom. He states he is not doing well today and hurting all over. He reports yesterday he was able to walk down the shaver and up a flight of stairs. Today he can not do it. PT was in room and he wanted to try later. He is on 2L NC 99% sat. He is afebrile. No family at bedside. No new CXR or ABGs.       Meds:    budesonide-formoterol 2 puff Inhalation BID - RT   enoxaparin 40 mg Subcutaneous Q24H   famotidine 20 mg Oral BID AC   furosemide 40 mg Intravenous Q12H   guaiFENesin 1,200 mg Oral Q12H   hydrocortisone 25 mg Rectal BID   ipratropium-albuterol 3 mL Nebulization Q6H - RT   iron sucrose (VENOFER) IVPB 100 mg Intravenous Q24H   lisinopril 10 mg Oral Q24H   metoprolol tartrate 12.5 mg Oral Q12H   nicotine 1 patch Transdermal Nightly   polyethylene glycol 17 g Oral Daily   predniSONE 20 mg Oral Daily   sodium chloride 10 mL Intravenous Q12H        Review of Systems:     Review of Systems - General ROS: negative for - chills or fever  Respiratory ROS: positive for - shortness of breath and wheezing  Cardiovascular ROS: negative for - chest pain or edema  Gastrointestinal ROS: no abdominal pain, change in bowel habits, or black or bloody stools  Musculoskeletal ROS: negative for - gait disturbance , positive for pain all over   Dermatological ROS: negative for pruritus and rash  Neurological: ROS: positive for weakness    Ventilator Settings:     Vt (Set, L): 0.6 L  Resp Rate (Set): 10  Pressure Support (cm H2O): 5 cm H20  FiO2 (%): 35 %  PEEP/CPAP (cm H2O): 5 cm H20  Minute Ventilation (L/min) (Obs): 11.5 L/min  Resp Rate (Observed) Vent: 16  I:E Ratio (Set): 1:1.40  I:E Ratio  (Obs): 1:1.8  PIP Observed (cm H2O): 11 cm H2O  RSBI: 42  Physical Exam:  Temp:  [98.3 °F (36.8 °C)-98.6 °F (37 °C)] 98.4 °F (36.9 °C)  Heart Rate:  [77-96] 88  Resp:  [16-18] 16  BP: (118-149)/(67-81) 139/71    Intake/Output Summary (Last 24 hours) at 11/21/2019 1012  Last data filed at 11/21/2019 0742  Gross per 24 hour   Intake 460 ml   Output 4200 ml   Net -3740 ml     SpO2 Percentage    11/21/19 0634 11/21/19 0638 11/21/19 0742   SpO2: 94% 92% 99%      Physical Exam   Constitutional: He is oriented to person, place, and time. He appears well-developed and well-nourished. He does not have a sickly appearance. He does not appear ill. No distress. Nasal cannula in place.   Eyes: No scleral icterus.   Cardiovascular: Normal rate and regular rhythm.   No murmur heard.  Pulmonary/Chest: Effort normal. No accessory muscle usage. No respiratory distress. He has decreased breath sounds. He has wheezes. He has no rhonchi.   Musculoskeletal: He exhibits edema.   Neurological: He is alert and oriented to person, place, and time.   Skin: He is not diaphoretic.   Psychiatric: He has a normal mood and affect. His mood appears not anxious. He is not agitated.     Results from last 7 days   Lab Units 11/20/19  0613 11/18/19  0545 11/17/19  0241   WBC 10*3/mm3 10.53 8.35 5.22   HEMOGLOBIN g/dL 9.8* 8.4* 7.5*   PLATELETS 10*3/mm3 399 333 305     Results from last 7 days   Lab Units 11/21/19  0515 11/20/19  0613 11/17/19  0257 11/17/19  0241   SODIUM mmol/L 142 140  --  141   SODIUM, ARTERIAL mmol/L  --   --  143  --    POTASSIUM mmol/L 4.0 4.0  --  4.4   BUN mg/dL 25* 21  --  19   CREATININE mg/dL 1.15 0.92  --  0.99     Results from last 7 days   Lab Units 11/17/19  0257 11/16/19  0349 11/15/19  2043   PH, ARTERIAL pH units 7.381 7.489* 7.291*   PCO2, ARTERIAL mm Hg 49.0* 41.1 66.8*   PO2 ART mm Hg 75.4* 56.8* 121.0*   FIO2 % 40 40 60     Blood Culture   Date Value Ref Range Status   11/15/2019 No growth at 24 hours  Preliminary    11/15/2019 No growth at 24 hours  Preliminary   Recent films:  Xr Chest 2 View    Result Date: 11/20/2019  1. There appears to be mild bibasilar atelectasis with additional trace layering pleural effusions. No obvious inflammatory infiltrate.  This report was finalized on 11/20/2019 14:57 by Dr Alexei Vanessa, .    Films reviewed personally by me.  My interpretation: no new imaging.   Yesterday's imaging showed bibasilar atelectasis      Pulmonary Assessment:    1. Severe acute respiratory failure with hypercapnia, improved, hypoxia compensated  2. Personal history of tobacco abuse  3. Possible substantial COPD  4. Probable chronic respiratory acidosis    Recommend:     · O2 walks with PT    · Encourage IS and flutter   · Continue Mucinex  · Continue bronchodilators and antibiotics, empiric  · Lovenox for DVT prophylaxis   · Pepcid for GI prophylaxis   · Will give one time IV solu medrol 80 mg and continue PO prednisone  · Increase activity     Electronically signed by ADAN Glasgow on 11/21/2019 at 10:12 AM     Physician substantive portion:  Patient had a bad night overnight, with increasing dyspnea and wheezing the chest continuously aggravated by exertion and alleviated by nothing.  We had cut his steroid dosing down yesterday.  Exam shows him to be ill-appearing.  Mild to moderate wheezes present.  He does not appear ready to taper steroids and will actually go ahead and give him another dose of IV today.  Encouraged ambulation.  Imaging has shown atelectasis which should improve with increased ventilation and activity.    I have seen and examined patient personally, performing a face-to-face diagnostic evaluation with plan of care reviewed and developed with APRN and nursing staff. I have addended and/or modified the above history of present illness, physical examination, and assessment and plan to reflect my findings and impressions. Essential elements of the care plan were discussed with APRN  above.  Agree with findings and assessment/plan as documented above.    Electronically signed by Waylon Ramey MD, on 11/21/2019, 11:45 AM

## 2019-11-22 VITALS
TEMPERATURE: 98 F | OXYGEN SATURATION: 95 % | RESPIRATION RATE: 16 BRPM | SYSTOLIC BLOOD PRESSURE: 115 MMHG | HEIGHT: 64 IN | BODY MASS INDEX: 33.09 KG/M2 | WEIGHT: 193.8 LBS | HEART RATE: 84 BPM | DIASTOLIC BLOOD PRESSURE: 75 MMHG

## 2019-11-22 LAB
ANION GAP SERPL CALCULATED.3IONS-SCNC: 8 MMOL/L (ref 5–15)
BUN BLD-MCNC: 30 MG/DL (ref 8–23)
BUN/CREAT SERPL: 23.6 (ref 7–25)
CALCIUM SPEC-SCNC: 8.8 MG/DL (ref 8.6–10.5)
CHLORIDE SERPL-SCNC: 99 MMOL/L (ref 98–107)
CO2 SERPL-SCNC: 34 MMOL/L (ref 22–29)
CREAT BLD-MCNC: 1.27 MG/DL (ref 0.76–1.27)
DEPRECATED RDW RBC AUTO: 53.2 FL (ref 37–54)
ERYTHROCYTE [DISTWIDTH] IN BLOOD BY AUTOMATED COUNT: 21.8 % (ref 12.3–15.4)
GFR SERPL CREATININE-BSD FRML MDRD: 55 ML/MIN/1.73
GLUCOSE BLD-MCNC: 146 MG/DL (ref 65–99)
HCT VFR BLD AUTO: 32.4 % (ref 37.5–51)
HGB BLD-MCNC: 9.1 G/DL (ref 13–17.7)
MCH RBC QN AUTO: 20.3 PG (ref 26.6–33)
MCHC RBC AUTO-ENTMCNC: 28.1 G/DL (ref 31.5–35.7)
MCV RBC AUTO: 72.2 FL (ref 79–97)
PLATELET # BLD AUTO: 407 10*3/MM3 (ref 140–450)
PMV BLD AUTO: 10.2 FL (ref 6–12)
POTASSIUM BLD-SCNC: 4.1 MMOL/L (ref 3.5–5.2)
RBC # BLD AUTO: 4.49 10*6/MM3 (ref 4.14–5.8)
SODIUM BLD-SCNC: 141 MMOL/L (ref 136–145)
WBC NRBC COR # BLD: 8.48 10*3/MM3 (ref 3.4–10.8)

## 2019-11-22 PROCEDURE — 94799 UNLISTED PULMONARY SVC/PX: CPT

## 2019-11-22 PROCEDURE — 94760 N-INVAS EAR/PLS OXIMETRY 1: CPT

## 2019-11-22 PROCEDURE — 99232 SBSQ HOSP IP/OBS MODERATE 35: CPT | Performed by: INTERNAL MEDICINE

## 2019-11-22 PROCEDURE — 85027 COMPLETE CBC AUTOMATED: CPT | Performed by: INTERNAL MEDICINE

## 2019-11-22 PROCEDURE — 25010000002 ENOXAPARIN PER 10 MG: Performed by: NURSE PRACTITIONER

## 2019-11-22 PROCEDURE — 25010000002 FUROSEMIDE PER 20 MG: Performed by: INTERNAL MEDICINE

## 2019-11-22 PROCEDURE — 97116 GAIT TRAINING THERAPY: CPT

## 2019-11-22 PROCEDURE — 63710000001 PREDNISONE PER 1 MG: Performed by: NURSE PRACTITIONER

## 2019-11-22 PROCEDURE — 80048 BASIC METABOLIC PNL TOTAL CA: CPT | Performed by: INTERNAL MEDICINE

## 2019-11-22 RX ORDER — NICOTINE 21 MG/24HR
1 PATCH, TRANSDERMAL 24 HOURS TRANSDERMAL NIGHTLY
Start: 2019-11-22

## 2019-11-22 RX ORDER — HYDROCODONE BITARTRATE AND ACETAMINOPHEN 5; 325 MG/1; MG/1
1 TABLET ORAL EVERY 6 HOURS PRN
Qty: 12 TABLET | Refills: 0 | Status: SHIPPED | OUTPATIENT
Start: 2019-11-22 | End: 2019-11-29

## 2019-11-22 RX ORDER — BUDESONIDE AND FORMOTEROL FUMARATE DIHYDRATE 160; 4.5 UG/1; UG/1
2 AEROSOL RESPIRATORY (INHALATION)
Refills: 12
Start: 2019-11-22 | End: 2020-01-01 | Stop reason: SDUPTHER

## 2019-11-22 RX ORDER — ALUMINA, MAGNESIA, AND SIMETHICONE 2400; 2400; 240 MG/30ML; MG/30ML; MG/30ML
15 SUSPENSION ORAL EVERY 6 HOURS PRN
Start: 2019-11-22

## 2019-11-22 RX ORDER — LISINOPRIL 10 MG/1
10 TABLET ORAL
Start: 2019-11-23 | End: 2021-01-01 | Stop reason: SDUPTHER

## 2019-11-22 RX ORDER — IPRATROPIUM BROMIDE AND ALBUTEROL SULFATE 2.5; .5 MG/3ML; MG/3ML
3 SOLUTION RESPIRATORY (INHALATION)
Qty: 360 ML
Start: 2019-11-22

## 2019-11-22 RX ORDER — PREDNISONE 10 MG/1
TABLET ORAL
Qty: 21 TABLET | Refills: 0 | Status: SHIPPED | OUTPATIENT
Start: 2019-11-22 | End: 2020-01-01

## 2019-11-22 RX ORDER — HYDROCORTISONE ACETATE 25 MG/1
25 SUPPOSITORY RECTAL 2 TIMES DAILY
Start: 2019-11-22 | End: 2021-01-01 | Stop reason: SDUPTHER

## 2019-11-22 RX ORDER — GUAIFENESIN 600 MG/1
1200 TABLET, EXTENDED RELEASE ORAL EVERY 12 HOURS SCHEDULED
Start: 2019-11-22

## 2019-11-22 RX ORDER — ONDANSETRON 4 MG/1
4 TABLET, FILM COATED ORAL EVERY 6 HOURS PRN
Start: 2019-11-22 | End: 2020-01-01 | Stop reason: SDUPTHER

## 2019-11-22 RX ORDER — BISACODYL 5 MG/1
10 TABLET, DELAYED RELEASE ORAL DAILY PRN
Start: 2019-11-22

## 2019-11-22 RX ORDER — FUROSEMIDE 40 MG/1
40 TABLET ORAL DAILY
Start: 2019-11-22

## 2019-11-22 RX ORDER — FERROUS SULFATE 325(65) MG
325 TABLET ORAL
Start: 2019-11-22

## 2019-11-22 RX ORDER — HYDROXYZINE 50 MG/1
50 TABLET, FILM COATED ORAL NIGHTLY PRN
Start: 2019-11-22

## 2019-11-22 RX ADMIN — FAMOTIDINE 20 MG: 20 TABLET, FILM COATED ORAL at 07:14

## 2019-11-22 RX ADMIN — METOPROLOL TARTRATE 12.5 MG: 25 TABLET, FILM COATED ORAL at 09:30

## 2019-11-22 RX ADMIN — PREDNISONE 20 MG: 20 TABLET ORAL at 09:29

## 2019-11-22 RX ADMIN — BUDESONIDE AND FORMOTEROL FUMARATE DIHYDRATE 2 PUFF: 160; 4.5 AEROSOL RESPIRATORY (INHALATION) at 07:32

## 2019-11-22 RX ADMIN — HYDROCODONE BITARTRATE AND ACETAMINOPHEN 1 TABLET: 5; 325 TABLET ORAL at 07:14

## 2019-11-22 RX ADMIN — HYDROCORTISONE ACETATE 25 MG: 25 SUPPOSITORY RECTAL at 09:30

## 2019-11-22 RX ADMIN — FAMOTIDINE 20 MG: 20 TABLET, FILM COATED ORAL at 17:22

## 2019-11-22 RX ADMIN — FUROSEMIDE 40 MG: 10 INJECTION, SOLUTION INTRAVENOUS at 09:29

## 2019-11-22 RX ADMIN — GUAIFENESIN 1200 MG: 600 TABLET, EXTENDED RELEASE ORAL at 09:29

## 2019-11-22 RX ADMIN — LISINOPRIL 10 MG: 10 TABLET ORAL at 09:29

## 2019-11-22 RX ADMIN — IPRATROPIUM BROMIDE AND ALBUTEROL SULFATE 3 ML: 2.5; .5 SOLUTION RESPIRATORY (INHALATION) at 11:27

## 2019-11-22 RX ADMIN — IPRATROPIUM BROMIDE AND ALBUTEROL SULFATE 3 ML: 2.5; .5 SOLUTION RESPIRATORY (INHALATION) at 07:32

## 2019-11-22 RX ADMIN — ENOXAPARIN SODIUM 40 MG: 40 INJECTION SUBCUTANEOUS at 17:22

## 2019-11-22 RX ADMIN — POLYETHYLENE GLYCOL 3350 17 G: 17 POWDER, FOR SOLUTION ORAL at 09:30

## 2019-11-22 RX ADMIN — SODIUM CHLORIDE, PRESERVATIVE FREE 10 ML: 5 INJECTION INTRAVENOUS at 09:32

## 2019-11-22 NOTE — DISCHARGE SUMMARY
[facility] Medicine Services  DISCHARGE SUMMARY       Date of Admission: 11/15/2019  Date of Discharge:  11/22/2019  Primary Care Physician: Delvin Caldera MD    Presenting Problem/History of Present Illness:  Shortness of breath.     Final Discharge Diagnoses:  Active Hospital Problems    Diagnosis   • **Acute respiratory failure with hypoxia and hypercapnia (CMS/HCC)   • COPD with acute exacerbation (CMS/HCC)   • Pulmonary nodule   • Acute diastolic heart failure (CMS/HCC)   • Microcytic anemia   • Hyperglycemia   • Alcoholism /alcohol abuse (CMS/HCC)   • Chronic constipation   • Essential hypertension     Consults: Dr. Ramey with pulmonary.     Procedures Performed:   Results for orders placed during the hospital encounter of 11/15/19   Adult Transthoracic Echo Complete With Contrast if Necessary Per Protocol    Narrative · Left ventricular systolic function is normal. Estimated EF appears to be   in the range of 61 - 65%.  · Left ventricular diastolic dysfunction (grade I) consistent with   impaired relaxation.  · Left ventricular wall thickness is consistent with mild concentric   hypertrophy.  · No hemodynamically significant valvular abnormalities identified on the   study.        Pertinent Test Results:   Imaging Results (Last 7 Days)     Procedure Component Value Units Date/Time    US Venous Doppler Lower Extremity Bilateral (duplex) [[id number]] Collected:  11/21/19 1536     Updated:  11/21/19 1539    Narrative:       History: Swelling       Impression:       Impression: There is no evidence of deep venous thrombosis or  superficial thrombophlebitis of right or left lower extremities.     Comments: Bilateral lower extremity venous duplex exam was performed  using color Doppler flow, Doppler waveform analysis, and grayscale  imaging, with and without compression. There is no evidence of deep  venous thrombosis in the common femoral, superficial femoral,  popliteal,  peroneal, anterior tibial, and posterior tibial veins bilaterally. No  thrombus is identified in the saphenofemoral junctions and greater  saphenous veins bilaterally.         This report was finalized on 11/21/2019 15:36 by Dr. John Keane MD.    XR Chest 2 View [[id number]] Collected:  11/20/19 1455     Updated:  11/20/19 1500    Narrative:       XR CHEST 2 VW- 11/20/2019 1:46 PM CST     HISTORY: worsening SOA, increased cough, abnormal sputum culture;  J96.01-Acute respiratory failure with hypoxia; J96.02-Acute respiratory  failure with hypercapnia; R26.9-Unspecified abnormalities of gait and  mobility       COMPARISON: Chest exam dated 11/17/2019.     FINDINGS:   Upright frontal and lateral radiographs of the chest were obtained.     Status post extubation. Minimal patchy densities overlying the  hemidiaphragms, thought to reflect a basilar atelectasis. There appear  to be bilateral trace layering effusions as well. Hyperexpanded lung  volumes with flattening of the hemidiaphragms, suggesting underlying  emphysema. The heart size is normal. The pulmonary vasculature are  nondilated. No acute bony abnormality.       Impression:       1. There appears to be mild bibasilar atelectasis with additional trace  layering pleural effusions. No obvious inflammatory infiltrate.     This report was finalized on 11/20/2019 14:57 by Dr Alexei Vanessa, .    XR Chest 1 View [[id number]] Updated:  11/18/19 0445    XR Chest 1 View [[id number]] Collected:  11/17/19 0932     Updated:  11/17/19 0936    Narrative:       EXAMINATION:  XR CHEST 1 VW-  11/17/2019 3:20 AM CST     HISTORY: on ventilator; J96.01-Acute respiratory failure with hypoxia;  J96.02-Acute respiratory failure with hypercapnia     COMPARISON: 11/15/2019.     FINDINGS:  There is a new NG tube extending to the stomach. The  endotracheal tube tip is at the T4-5 level. There is no dense  infiltrate. There is mild bronchial wall thickening. Heart size is  upper  limits of normal.       Impression:       1. No focal infiltrate.  2. Mild bronchial wall thickening, stable.        This report was finalized on 11/17/2019 09:33 by Dr. Elias Pennington MD.    CT Angiogram Chest With & Without Contrast [[id number]] Collected:  11/16/19 1735     Updated:  11/16/19 1742    Narrative:       EXAMINATION:   CT ANGIOGRAM CHEST W WO CONTRAST-  11/16/2019 5:35 PM CST     HISTORY: CT chest angiogram dated 11/16/2019 5:09 PM CST      HISTORY: PE suspected     COMPARISON: None.      DLP: 560 mGy cm     TECHNIQUE: Helical tomographic images of the chest were obtained after  the administration of intravenous contrast following angiogram protocol.  Additionally, 3D MIP reconstructions in the coronal and sagittal planes  were provided.        FINDINGS:    The imaged portion of the base of the neck appears unremarkable.      There is adequate enhancement of the pulmonary arteries to evaluate for  central and segmental pulmonary emboli. There are no filling defects  within the main, lobar, segmental or visualized subsegmental pulmonary  arteries. The pulmonary vessels are within normal limits.      Small bilateral posterior pleural effusions are present. A 7 mm nodule  is present right upper lung on image 44. Left basilar atelectasis is  noted.. The trachea and bronchial tree are patent.      The heart and great vessels appear unremarkable. There is no pericardial  effusion.      No enlarged axillary or mediastinal lymph nodes are present.      The osseous structures of the thorax and surrounding soft tissues  demonstrate no acute process.      Nasogastric tube is present as a feeding tube and is satisfactorily  positioned.        Impression:       1. No evidence of embolic disease.        2. Infiltrate or atelectasis left lower lobe.        3. Small bilateral pleural effusions are noted.  4. 7 mm nodule left upper lobe. 6 month follow-up is suggested.        This report was finalized on  11/16/2019 17:39 by Dr. Kailash Crouch MD.    XR Abdomen KUB [[id number]] Collected:  11/16/19 1137     Updated:  11/16/19 1142    Narrative:       EXAMINATION:  XR ABDOMEN KUB-  11/16/2019 11:06 AM CST     HISTORY: OG tube placement.     COMPARISON: No comparison study.     TECHNIQUE: Supine image of the chest and upper abdomen.      FINDINGS:   A gastric tube extends to the stomach. The side-port and tip  are in the stomach. The side-port is just below the gastroesophageal  junction. The tube could be advanced distally about 5 cm.       Impression:       OG tube placement, as described.        This report was finalized on 11/16/2019 11:39 by Dr. Elias Pennington MD.    XR Chest 1 View [[id number]] Collected:  11/15/19 2005     Updated:  11/15/19 2008    Narrative:       Frontal upright radiograph of the chest 11/15/2019 6:15 PM CST     COMPARISON: 11/15/2019 at 1:58 PM     HISTORY postintubation.     FINDINGS:   The lungs are clear. Cardiac silhouettes mildly enlarged. Endotracheal  tube is present satisfactorily position.      The osseous structures and surrounding soft tissues demonstrate no acute  abnormality.       Impression:       1. Mild cardiomegaly no evidence of pulmonary vascular congestion.        This report was finalized on 11/15/2019 20:05 by Dr. Kailash Crouch MD.    XR Chest 1 View [[id number]] Collected:  11/15/19 1413     Updated:  11/15/19 1417    Narrative:       Frontal upright radiograph of the chest 11/15/2019 2:09 PM CST     COMPARISON: 10/30/2019.     HISTORY: Short of breath.     FINDINGS:   Hyperinflation flattening diaphragms noted consistent with COPD.. The  cardiac silhouettes mildly enlarged but unchanged..      The osseous structures and surrounding soft tissues demonstrate no acute  abnormality.       Impression:       1. COPD no acute cardiopulmonary process.        This report was finalized on 11/15/2019 14:14 by Dr. Kailash Crouch MD.        Lab Results (last 7 days)     Procedure  Component Value Units Date/Time    Basic Metabolic Panel [[id number]]  (Abnormal) Collected:  11/22/19 0425    Specimen:  Blood Updated:  11/22/19 0510     Glucose 146 mg/dL      BUN 30 mg/dL      Creatinine 1.27 mg/dL      Sodium 141 mmol/L      Potassium 4.1 mmol/L      Chloride 99 mmol/L      CO2 34.0 mmol/L      Calcium 8.8 mg/dL      eGFR Non African Amer 55 mL/min/1.73      BUN/Creatinine Ratio 23.6     Anion Gap 8.0 mmol/L     Narrative:       GFR Normal >60  Chronic Kidney Disease <60  Kidney Failure <15    CBC (No Diff) [[id number]]  (Abnormal) Collected:  11/22/19 0425    Specimen:  Blood Updated:  11/22/19 0500     WBC 8.48 10*3/mm3      RBC 4.49 10*6/mm3      Hemoglobin 9.1 g/dL      Hematocrit 32.4 %      MCV 72.2 fL      MCH 20.3 pg      MCHC 28.1 g/dL      RDW 21.8 %      RDW-SD 53.2 fl      MPV 10.2 fL      Platelets 407 10*3/mm3     Basic Metabolic Panel [[id number]]  (Abnormal) Collected:  11/21/19 0515    Specimen:  Blood Updated:  11/21/19 0601     Glucose 110 mg/dL      BUN 25 mg/dL      Creatinine 1.15 mg/dL      Sodium 142 mmol/L      Potassium 4.0 mmol/L      Chloride 98 mmol/L      CO2 37.0 mmol/L      Calcium 9.2 mg/dL      eGFR Non African Amer 62 mL/min/1.73      BUN/Creatinine Ratio 21.7     Anion Gap 7.0 mmol/L     Narrative:       GFR Normal >60  Chronic Kidney Disease <60  Kidney Failure <15    Blood Culture - Blood, Arm, Left [[id number]] Collected:  11/15/19 1355    Specimen:  Blood from Arm, Left Updated:  11/20/19 1431     Blood Culture No growth at 5 days    Blood Culture - Blood, Hand, Right [[id number]] Collected:  11/15/19 1359    Specimen:  Blood from Hand, Right Updated:  11/20/19 1431     Blood Culture No growth at 5 days    Basic Metabolic Panel [[id number]]  (Abnormal) Collected:  11/20/19 0613    Specimen:  Blood Updated:  11/20/19 0653     Glucose 88 mg/dL      BUN 21 mg/dL      Creatinine 0.92 mg/dL      Sodium 140 mmol/L      Potassium 4.0 mmol/L      Chloride 100 mmol/L       CO2 36.0 mmol/L      Calcium 9.3 mg/dL      eGFR Non African Amer 80 mL/min/1.73      BUN/Creatinine Ratio 22.8     Anion Gap 4.0 mmol/L     Narrative:       GFR Normal >60  Chronic Kidney Disease <60  Kidney Failure <15    CBC (No Diff) [[id number]]  (Abnormal) Collected:  11/20/19 0613    Specimen:  Blood Updated:  11/20/19 0639     WBC 10.53 10*3/mm3      RBC 4.99 10*6/mm3      Hemoglobin 9.8 g/dL      Hematocrit 36.6 %      MCV 73.3 fL      MCH 19.6 pg      MCHC 26.8 g/dL      RDW 20.6 %      RDW-SD 52.7 fl      MPV 9.8 fL      Platelets 399 10*3/mm3     Vitamin B12 [[id number]]  (Normal) Collected:  11/19/19 0553    Specimen:  Blood Updated:  11/19/19 2013     Vitamin B-12 869 pg/mL     Respiratory Culture - Sputum, Bronchus [[id number]]  (Abnormal)  (Susceptibility) Collected:  11/16/19 1604    Specimen:  Sputum from Bronchus Updated:  11/19/19 0959     Respiratory Culture Light growth (2+) Acinetobacter baumannii      Rare Normal Respiratory Richardson     Gram Stain Greater than 25 WBCs per low power field      Moderate (3+) Epithelial cells per low power field      Moderate (3+) Mixed gram positive richardson      Few (2+) Gram negative bacilli    Susceptibility      Acinetobacter baumannii     ADENIKE     Ampicillin + Sulbactam Susceptible     Cefepime Susceptible     Ceftazidime Susceptible     Gentamicin Susceptible     Piperacillin + Tazobactam Susceptible     Tetracycline Susceptible                    Ferritin [[id number]]  (Abnormal) Collected:  11/17/19 0241    Specimen:  Blood Updated:  11/18/19 0841     Ferritin 5.87 ng/mL     Iron Profile [[id number]]  (Abnormal) Collected:  11/17/19 0241    Specimen:  Blood Updated:  11/18/19 0841     Iron 11 mcg/dL      Iron Saturation 3 %      Transferrin 293 mg/dL      TIBC 437 mcg/dL     CBC Auto Differential [[id number]]  (Abnormal) Collected:  11/18/19 0545    Specimen:  Blood Updated:  11/18/19 0552     WBC 8.35 10*3/mm3      RBC 4.19 10*6/mm3      Hemoglobin 8.4 g/dL       Hematocrit 31.0 %      MCV 74.0 fL      MCH 20.0 pg      MCHC 27.1 g/dL      RDW 20.0 %      RDW-SD 53.1 fl      MPV 9.4 fL      Platelets 333 10*3/mm3      Neutrophil % 91.3 %      Lymphocyte % 4.0 %      Monocyte % 3.0 %      Eosinophil % 0.0 %      Basophil % 0.1 %      Neutrophils, Absolute 7.63 10*3/mm3      Lymphocytes, Absolute 0.33 10*3/mm3      Monocytes, Absolute 0.25 10*3/mm3      Eosinophils, Absolute 0.00 10*3/mm3      Basophils, Absolute 0.01 10*3/mm3     Basic Metabolic Panel [[id number]]  (Abnormal) Collected:  11/17/19 0241    Specimen:  Blood Updated:  11/17/19 0307     Glucose 199 mg/dL      BUN 19 mg/dL      Creatinine 0.99 mg/dL      Sodium 141 mmol/L      Potassium 4.4 mmol/L      Chloride 105 mmol/L      CO2 26.0 mmol/L      Calcium 8.4 mg/dL      eGFR Non African Amer 73 mL/min/1.73      BUN/Creatinine Ratio 19.2     Anion Gap 10.0 mmol/L     Narrative:       GFR Normal >60  Chronic Kidney Disease <60  Kidney Failure <15    CBC Auto Differential [[id number]]  (Abnormal) Collected:  11/17/19 0241    Specimen:  Blood Updated:  11/17/19 0303     WBC 5.22 10*3/mm3      RBC 3.83 10*6/mm3      Hemoglobin 7.5 g/dL      Hematocrit 27.6 %      MCV 72.1 fL      MCH 19.6 pg      MCHC 27.2 g/dL      RDW 20.3 %      RDW-SD 51.8 fl      MPV 10.1 fL      Platelets 305 10*3/mm3      Neutrophil % 86.8 %      Lymphocyte % 8.6 %      Monocyte % 3.6 %      Eosinophil % 0.0 %      Basophil % 0.0 %      Immature Grans % 1.0 %      Neutrophils, Absolute 4.53 10*3/mm3      Lymphocytes, Absolute 0.45 10*3/mm3      Monocytes, Absolute 0.19 10*3/mm3      Eosinophils, Absolute 0.00 10*3/mm3      Basophils, Absolute 0.00 10*3/mm3      Immature Grans, Absolute 0.05 10*3/mm3      nRBC 2.7 /100 WBC     Blood Gas, Arterial With Co-Ox [[id number]]  (Abnormal) Collected:  11/17/19 0257    Specimen:  Arterial Blood Updated:  11/17/19 0302     Site Right Brachial     Dov's Test N/A     pH, Arterial 7.381 pH units      pCO2,  Arterial 49.0 mm Hg      Comment: 83 Value above reference range        pO2, Arterial 75.4 mm Hg      Comment: 84 Value below reference range        HCO3, Arterial 29.0 mmol/L      Comment: 83 Value above reference range        Base Excess, Arterial 3.5 mmol/L      Comment: 83 Value above reference range        O2 Saturation, Arterial 93.9 %      Comment: 84 Value below reference range        Hemoglobin, Blood Gas 7.5 g/dL      Comment: 84 Value below reference range        Hematocrit, Blood Gas 23.0 %      Comment: 84 Value below reference range        Oxyhemoglobin 91.7 %      Comment: 84 Value below reference range        Methemoglobin 1.30 %      Carboxyhemoglobin 1.0 %      A-a Gradiant 153.4 mmHg      Temperature 37.0 C      Sodium, Arterial 143 mmol/L      Potassium, Arterial 4.2 mmol/L      Barometric Pressure for Blood Gas 754 mmHg      Modality Ventilator     FIO2 40 %      Ventilator Mode AC     Set Tidal Volume 600     Set Mech Resp Rate 10.0     PEEP 5.0     Note --     Collected by [id number]     Comment: Meter: [id number]     :  [id number]        pH, Temp Corrected --     pCO2, Temperature Corrected --     pO2, Temperature Corrected --    Urine Drug Screen - Urine, Clean Catch [[id number]]  (Normal) Collected:  11/16/19 1101    Specimen:  Urine, Clean Catch Updated:  11/16/19 1123     THC, Screen, Urine Negative     Phencyclidine (PCP), Urine Negative     Cocaine Screen, Urine Negative     Methamphetamine, Ur Negative     Opiate Screen Negative     Amphetamine Screen, Urine Negative     Benzodiazepine Screen, Urine Negative     Tricyclic Antidepressants Screen Negative     Methadone Screen, Urine Negative     Barbiturates Screen, Urine Negative     Oxycodone Screen, Urine Negative     Propoxyphene Screen Negative     Buprenorphine, Screen, Urine Negative    Narrative:       Cutoff For Drugs Screened:    Amphetamines               500 ng/ml  Barbiturates               200 ng/ml  Benzodiazepines             150 ng/ml  Cocaine                    150 ng/ml  Methadone                  200 ng/ml  Opiates                    100 ng/ml  Phencyclidine               25 ng/ml  THC                            50 ng/ml  Methamphetamine            500 ng/ml  Tricyclic Antidepressants  300 ng/ml  Oxycodone                  100 ng/ml  Propoxyphene               300 ng/ml  Buprenorphine               10 ng/ml    The normal value for all drugs tested is negative. This report includes unconfirmed screening results, with the cutoff values listed, to be used for medical treatment purposes only.  Unconfirmed results must not be used for non-medical purposes such as employment or legal testing.  Clinical consideration should be applied to any drug of abuse test, particularly when unconfirmed results are used.      Comprehensive Metabolic Panel [[id number]]  (Abnormal) Collected:  11/16/19 0704    Specimen:  Blood Updated:  11/16/19 0737     Glucose 171 mg/dL      BUN 16 mg/dL      Creatinine 1.03 mg/dL      Sodium 142 mmol/L      Potassium 4.3 mmol/L      Chloride 104 mmol/L      CO2 30.0 mmol/L      Calcium 8.5 mg/dL      Total Protein 6.8 g/dL      Albumin 3.10 g/dL      ALT (SGPT) 13 U/L      AST (SGOT) 23 U/L      Alkaline Phosphatase 119 U/L      Total Bilirubin 0.6 mg/dL      eGFR Non African Amer 70 mL/min/1.73      Globulin 3.7 gm/dL      A/G Ratio 0.8 g/dL      BUN/Creatinine Ratio 15.5     Anion Gap 8.0 mmol/L     Narrative:       GFR Normal >60  Chronic Kidney Disease <60  Kidney Failure <15    Lipid Panel [[id number]]  (Abnormal) Collected:  11/16/19 0704    Specimen:  Blood Updated:  11/16/19 0737     Total Cholesterol 103 mg/dL      Triglycerides 145 mg/dL      HDL Cholesterol 25 mg/dL      LDL Cholesterol  49 mg/dL      VLDL Cholesterol 29 mg/dL      LDL/HDL Ratio 1.96    Narrative:       Cholesterol Reference Ranges  (U.S. Department of Health and Human Services ATP III Classifications)    Desirable          <200  mg/dL  Borderline High    200-239 mg/dL  High Risk          >240 mg/dL      Triglyceride Reference Ranges  (U.S. Department of Health and Human Services ATP III Classifications)    Normal           <150 mg/dL  Borderline High  150-199 mg/dL  High             200-499 mg/dL  Very High        >500 mg/dL    HDL Reference Ranges  (U.S. Department of Health and Human Services ATP III Classifcations)    Low     <40 mg/dl (major risk factor for CHD)  High    >60 mg/dl ('negative' risk factor for CHD)        LDL Reference Ranges  (U.S. Department of Health and Human Services ATP III Classifcations)    Optimal          <100 mg/dL  Near Optimal     100-129 mg/dL  Borderline High  130-159 mg/dL  High             160-189 mg/dL  Very High        >189 mg/dL    TSH [[id number]]  (Normal) Collected:  11/16/19 0704    Specimen:  Blood Updated:  11/16/19 0737     TSH 0.407 uIU/mL     CBC Auto Differential [[id number]]  (Abnormal) Collected:  11/16/19 0704    Specimen:  Blood Updated:  11/16/19 0729     WBC 4.97 10*3/mm3      RBC 4.17 10*6/mm3      Hemoglobin 8.1 g/dL      Hematocrit 29.9 %      MCV 71.7 fL      MCH 19.4 pg      MCHC 27.1 g/dL      RDW 20.2 %      RDW-SD 51.7 fl      MPV 9.9 fL      Platelets 376 10*3/mm3      Neutrophil % 79.3 %      Lymphocyte % 17.3 %      Monocyte % 2.4 %      Eosinophil % 0.0 %      Basophil % 0.2 %      Neutrophils, Absolute 3.94 10*3/mm3      Lymphocytes, Absolute 0.86 10*3/mm3      Monocytes, Absolute 0.12 10*3/mm3      Eosinophils, Absolute 0.00 10*3/mm3      Basophils, Absolute 0.01 10*3/mm3     Hemoglobin A1c [[id number]]  (Abnormal) Collected:  11/16/19 0704    Specimen:  Blood Updated:  11/16/19 0726     Hemoglobin A1C 6.00 %     Narrative:       Hemoglobin A1C Ranges:    Increased Risk for Diabetes  5.7% to 6.4%  Diabetes                     >= 6.5%  Diabetic Goal                < 7.0%    Blood Gas, Arterial [[id number]]  (Abnormal) Collected:  11/16/19 0349    Specimen:  Arterial Blood  Updated:  11/16/19 0358     Site Right Radial     Dov's Test Positive     pH, Arterial 7.489 pH units      Comment: 83 Value above reference range        pCO2, Arterial 41.1 mm Hg      pO2, Arterial 56.8 mm Hg      Comment: 84 Value below reference range        HCO3, Arterial 31.2 mmol/L      Comment: 83 Value above reference range        Base Excess, Arterial 7.2 mmol/L      Comment: 83 Value above reference range        O2 Saturation, Arterial 90.9 %      Comment: 84 Value below reference range        Temperature 37.0 C      Barometric Pressure for Blood Gas 758 mmHg      Modality Ventilator     FIO2 40 %      Ventilator Mode AC     Set Tidal Volume 600     Set Mech Resp Rate 18.0     PEEP 5.0     Collected by [id number]     Comment: Meter: [id number]     :  [id number]       Blood Gas, Arterial [[id number]]  (Abnormal) Collected:  11/15/19 2043    Specimen:  Arterial Blood Updated:  11/15/19 2053     Site Right Brachial     Dov's Test N/A     pH, Arterial 7.291 pH units      Comment: 84 Value below reference range        pCO2, Arterial 66.8 mm Hg      Comment: 83 Value above reference range        pO2, Arterial 121.0 mm Hg      Comment: 83 Value above reference range        HCO3, Arterial 32.2 mmol/L      Comment: 83 Value above reference range        Base Excess, Arterial 4.7 mmol/L      Comment: 83 Value above reference range        O2 Saturation, Arterial 98.5 %      Temperature 37.0 C      Barometric Pressure for Blood Gas 759 mmHg      Modality Ventilator     FIO2 60 %      Ventilator Mode AC     Set Tidal Volume 600     Set Mech Resp Rate 14.0     PEEP 5.0     Collected by [id number]     Comment: Meter: [id number]     :  [id number]       Blood Gas, Arterial [[id number]]  (Abnormal) Collected:  11/15/19 1630    Specimen:  Arterial Blood Updated:  11/15/19 1640     Site Right Radial     Dov's Test Positive     pH, Arterial 7.309 pH units      Comment: 84 Value below reference range         pCO2, Arterial 66.1 mm Hg      Comment: 83 Value above reference range        pO2, Arterial 76.8 mm Hg      Comment: 84 Value below reference range        HCO3, Arterial 33.2 mmol/L      Comment: 83 Value above reference range        Base Excess, Arterial 5.9 mmol/L      Comment: 83 Value above reference range        O2 Saturation, Arterial 94.4 %      Temperature 37.0 C      Barometric Pressure for Blood Gas 759 mmHg      Modality BiPap     FIO2 30 %      Ventilator Mode NA     Set Mech Resp Rate 16.0     IPAP 20     Comment: Meter: [id number]     :  [id number]        EPAP 6     Collected by [id number]    Blood Gas, Arterial [[id number]]  (Abnormal) Collected:  11/15/19 1514    Specimen:  Arterial Blood Updated:  11/15/19 1518     Site Left Radial     Dov's Test Positive     pH, Arterial 7.321 pH units      Comment: 84 Value below reference range        pCO2, Arterial 64.5 mm Hg      Comment: 83 Value above reference range        pO2, Arterial 74.0 mm Hg      Comment: 84 Value below reference range        HCO3, Arterial 33.3 mmol/L      Comment: 83 Value above reference range        Base Excess, Arterial 6.2 mmol/L      Comment: 83 Value above reference range        O2 Saturation, Arterial 93.9 %      Comment: 84 Value below reference range        Temperature 37.0 C      Barometric Pressure for Blood Gas 759 mmHg      Modality Heated HFNC     FIO2 35 %      Flow Rate 40.0 lpm      Ventilator Mode NA     Collected by [id number]     Comment: Meter: [id number]     :  [id number]       D-dimer, Quantitative [[id number]]  (Abnormal) Collected:  11/15/19 1355    Specimen:  Blood Updated:  11/15/19 1501     D-Dimer, Quantitative 1.81 mg/L (FEU)     Narrative:       Reference Range is 0-0.50 mg/L FEU. However, results <0.50 mg/L FEU tends to rule out DVT or PE. Results >0.50 mg/L FEU are not useful in predicting absence or presence of DVT or PE.    Manual Differential [[id number]]  (Abnormal) Collected:   11/15/19 1355    Specimen:  Blood Updated:  11/15/19 1443     Neutrophil % 72.7 %      Lymphocyte % 8.1 %      Monocyte % 4.0 %      Eosinophil % 12.1 %      Basophil % 2.0 %      Atypical Lymphocyte % 1.0 %      Neutrophils Absolute 4.78 10*3/mm3      Lymphocytes Absolute 0.53 10*3/mm3      Monocytes Absolute 0.26 10*3/mm3      Eosinophils Absolute 0.79 10*3/mm3      Basophils Absolute 0.13 10*3/mm3      nRBC 4.0 /100 WBC      Anisocytosis Slight/1+     Hypochromia Mod/2+     Microcytes Slight/1+     Poikilocytes Slight/1+     Polychromasia Slight/1+     WBC Morphology Normal     Giant Platelets Large/3+    CBC Auto Differential [[id number]]  (Abnormal) Collected:  11/15/19 1355    Specimen:  Blood Updated:  11/15/19 1443     WBC 6.57 10*3/mm3      RBC 4.34 10*6/mm3      Hemoglobin 8.5 g/dL      Hematocrit 32.4 %      MCV 74.7 fL      MCH 19.6 pg      MCHC 26.2 g/dL      RDW 20.5 %      RDW-SD 54.5 fl      MPV 9.5 fL      Platelets 372 10*3/mm3     Comprehensive Metabolic Panel [[id number]]  (Abnormal) Collected:  11/15/19 1355    Specimen:  Blood Updated:  11/15/19 1439     Glucose 128 mg/dL      BUN 14 mg/dL      Creatinine 1.06 mg/dL      Sodium 144 mmol/L      Potassium 3.8 mmol/L      Chloride 103 mmol/L      CO2 33.0 mmol/L      Calcium 9.0 mg/dL      Total Protein 7.6 g/dL      Albumin 3.70 g/dL      ALT (SGPT) 17 U/L      AST (SGOT) 27 U/L      Alkaline Phosphatase 145 U/L      Total Bilirubin 0.6 mg/dL      eGFR Non African Amer 68 mL/min/1.73      Globulin 3.9 gm/dL      A/G Ratio 0.9 g/dL      BUN/Creatinine Ratio 13.2     Anion Gap 8.0 mmol/L     Narrative:       GFR Normal >60  Chronic Kidney Disease <60  Kidney Failure <15    BNP [[id number]]  (Normal) Collected:  11/15/19 1355    Specimen:  Blood Updated:  11/15/19 1435     proBNP 1,550.0 pg/mL     Narrative:       Among patients with dyspnea, NT-proBNP is highly sensitive for the detection of acute congestive heart failure. In addition NT-proBNP of  <300 pg/ml effectively rules out acute congestive heart failure with 99% negative predictive value.    Troponin [[id number]]  (Normal) Collected:  11/15/19 1355    Specimen:  Blood Updated:  11/15/19 1435     Troponin T 0.012 ng/mL     Narrative:       Troponin T Reference Range:  <= 0.03 ng/mL-   Negative for AMI  >0.03 ng/mL-     Abnormal for myocardial necrosis.  Clinicians would have to utilize clinical acumen, EKG, Troponin and serial changes to determine if it is an Acute Myocardial Infarction or myocardial injury due to an underlying chronic condition.     Magnesium [[id number]]  (Normal) Collected:  11/15/19 1355    Specimen:  Blood Updated:  11/15/19 1435     Magnesium 2.0 mg/dL     Lactic Acid, Plasma [[id number]]  (Normal) Collected:  11/15/19 1355    Specimen:  Blood Updated:  11/15/19 1431     Lactate 1.1 mmol/L     Blood Gas, Arterial [[id number]]  (Abnormal) Collected:  11/15/19 1356    Specimen:  Arterial Blood Updated:  11/15/19 1401     Site Left Radial     Dov's Test Positive     pH, Arterial 7.355 pH units      pCO2, Arterial 57.5 mm Hg      Comment: 83 Value above reference range        pO2, Arterial 45.9 mm Hg      Comment: 85 Value below critical limit        HCO3, Arterial 32.1 mmol/L      Comment: 83 Value above reference range        Base Excess, Arterial 5.7 mmol/L      Comment: 83 Value above reference range        O2 Saturation, Arterial 80.0 %      Comment: 84 Value below reference range        Temperature 37.0 C      Barometric Pressure for Blood Gas 759 mmHg      Modality Room Air     Ventilator Mode NA     Notified Who DR YOU     Notified By [id number]     Notified Time 11/15/2019 14:01     Collected by [id number]     Comment: Meter: [id number]     :  [id number]           Hospital Course:  The patient was originally admitted on 11/15 by Charmaine Yao and Dr. Lamb.  He presented to the emergency department with shortness of breath. He was felt to be having an exacerbation of  chronic obstructive pulmonary disease secondary to long-standing and ongoing tobacco abuse.  His carbon dioxide retention and pH continued to worsen.  Trials of BiPAP were unsuccessful at controlling this.  He ultimately required intubation mechanical ventilation.  He was extubated on 11/17.     He was followed by the pulmonary service.  He will require 2 L nasal cannula at discharge.     He has finished Rocephin.  He has grown pan susceptible Acinetobacter on sputum culture.  He does not have a clear pneumonia.  This is likely colonization. Mucinex and incentive spirometry. Continue inhaled bronchodilators. Started Symbicort on 11/18. Weaned Solu-Medrol to oral prednisone on 11/19.     Counseled for tobacco cessation.  Nicotine patch offered.     He will need a repeat noncontrasted CT scan of the chest in 6 months to reevaluate his left upper lobe pulmonary nodule.     PA lateral chest x-ray on 11/20 showed mild bibasilar atelectasis with additional trace layering pleural effusions with no obvious inflammatory infiltrate.     Started lisinopril on 11/19 and have uptitrated.   Added metoprolol tartrate on 11/20.  He has some diastolic dysfunction on echocardiogram.  His BNP was normal at presentation, but he has had some complaints of scrotal edema and now has worsening lower extremity edema. Lasix IV BID was given for a few days with improvements.  He will be discharged on 40 mg of oral Lasix on a daily basis.  He has been encouraged to restrict his fluid intake somewhat and elevate his legs when not active.     He was hyperglycemic secondary to steroids. Hemoglobin A1c 6.0.      The patient appears to have a chronic microcytic anemia.  He has been transfused 1 unit packed red blood cells on 11/17.  Anemia substrates were not assessed prior to the administration of blood products, but I was able to add them to old blood.  He is significantly iron deficient.  IV Venofer.  Dr. Caldera had him set up to have an  "outpatient colonoscopy on 11/18, however, he was obviously admitted.  This will need to be rescheduled as an outpatient for further evaluation of his chronic microcytic, iron deficient anemia.     B12 normal.     Bowel regimen in place.      Lovenox was used for DVT prophylaxis.     Pepcid was used for peptic ulcer prophylaxis.     He has been accepted to a skilled nursing facility and per year, [place] today. It is called [facility].  This is close to his home.  This is also close to his sister and his daughter.  His ultimate plan is to go there for strengthening and continued medical tuning.  He ultimately plans to return home with home health and assistance from friends and family.    Physical Exam on Discharge:  /64 (BP Location: Right arm, Patient Position: Sitting)   Pulse 77   Temp 98 °F (36.7 °C) (Oral)   Resp 16   Ht 162.6 cm (64.02\")   Wt 87.9 kg (193 lb 12.8 oz)   SpO2 96%   BMI 33.25 kg/m²   Physical Exam  Constitutional: He is oriented to person, place, and time. He appears well-developed and well-nourished. Up on the side of the bed.  No distress.  No family present. Discussed with his nurse, Lauren.    Head: Normocephalic and atraumatic.   Eyes: Conjunctivae and EOM are normal. Pupils are equal, round, and reactive to light.   Neck: Neck supple. No JVD present.   Cardiovascular: Normal rate, regular rhythm, normal heart sounds and intact distal pulses. Exam reveals no gallop and no friction rub.   No murmur heard.  Pulmonary/Chest: Effort normal. No respiratory distress. He has wheezes (slight). He has no rales. He exhibits no tenderness. 2L.   Abdominal: Soft. Bowel sounds are normal. He exhibits distension. There is no tenderness. There is no rebound and no guarding.   Musculoskeletal: Normal range of motion. He exhibits no edema, tenderness or deformity. Trace to 1+ LE edema (improved from yesterday) and with improving scrotal edema.    Neurological: He is alert and oriented to " person, place, and time. He displays normal reflexes. No cranial nerve deficit. He exhibits normal muscle tone.   Skin: Skin is warm and dry. No rash noted.   Psychiatric: He has a normal mood and affect. His behavior is normal. Judgment and thought content normal.     Condition on Discharge: Good.     Discharge Disposition:  Skilled Nursing Facility ([facility] - External): [facility] in [place], [place].     Discharge Medications:     Discharge Medications      New Medications      Instructions Start Date   aluminum-magnesium hydroxide-simethicone 400-400-40 MG/5ML suspension  Commonly known as:  MAALOX MAX   15 mL, Oral, Every 6 Hours PRN      bisacodyl 5 MG EC tablet  Commonly known as:  DULCOLAX   10 mg, Oral, Daily PRN      budesonide-formoterol 160-4.5 MCG/ACT inhaler  Commonly known as:  SYMBICORT   2 puffs, Inhalation, 2 Times Daily - RT      ferrous sulfate 325 (65 FE) MG tablet  Commonly known as:  FERROUSUL   325 mg, Oral, Daily With Breakfast      furosemide 40 MG tablet  Commonly known as:  LASIX   40 mg, Oral, Daily      guaiFENesin 600 MG 12 hr tablet  Commonly known as:  MUCINEX   1,200 mg, Oral, Every 12 Hours Scheduled      HYDROcodone-acetaminophen 5-325 MG per tablet  Commonly known as:  NORCO   1 tablet, Oral, Every 6 Hours PRN      hydrocortisone 25 MG suppository  Commonly known as:  ANUSOL-HC   25 mg, Rectal, 2 Times Daily      hydrOXYzine 50 MG tablet  Commonly known as:  ATARAX   50 mg, Oral, Nightly PRN      ipratropium-albuterol 0.5-2.5 mg/3 ml nebulizer  Commonly known as:  DUO-NEB   3 mL, Nebulization, Every 6 Hours - RT      lisinopril 10 MG tablet  Commonly known as:  PRINIVIL,ZESTRIL   10 mg, Oral, Every 24 Hours Scheduled   Start Date:  11/23/2019     metoprolol tartrate 25 MG tablet  Commonly known as:  LOPRESSOR   25 mg, Oral, Every 12 Hours Scheduled      nicotine 21 MG/24HR patch  Commonly known as:  NICODERM CQ   1 patch, Transdermal, Nightly      ondansetron 4 MG tablet  Commonly known as:   ZOFRAN   4 mg, Oral, Every 6 Hours PRN      polyethylene glycol pack packet  Commonly known as:  MIRALAX   17 g, Oral, Daily   Start Date:  11/23/2019     predniSONE 10 MG tablet  Commonly known as:  DELTASONE   Take 4 tablets daily for 3 days, then take 2 tablets daily for 3 days, then take 1 tablet daily for 3 days, then stop.           Discharge Diet:   Diet Instructions     Diet: Cardiac; Thin      Discharge Diet:  Cardiac    Fluid Consistency:  Thin        Activity at Discharge:   Activity Instructions     Activity as Tolerated          Follow-up Appointments:   1. PCP or SNF physician in 1 week.   2. Dr. Ramey per him.   3. PCP to reschedule his missed colonoscopy when medically stable.  4.  PCP or Dr. Ramey to get a noncontrast CT in 6 months to reevaluate pulmonary nodularity.    Test Results Pending at Discharge: None.     Delvin Lopez DO  11/22/19  11:46 AM    Time: 40 minutes.

## 2019-11-22 NOTE — PROGRESS NOTES
Continued Stay Note   Mervat     Patient Name: Rodrigo Javed  MRN: [id number]  Today's Date: 11/22/2019    Admit Date: 11/15/2019    Discharge Plan     Row Name 11/22/19 0852       Plan    Plan  [facility]     Patient/Family in Agreement with Plan  yes    Plan Comments  Shannan called back from [facility] and they are offering pt a bed there, pt can be discharged there when medically ready. [phone].        Discharge Codes    No documentation.             MELODY Phelps

## 2019-11-22 NOTE — DISCHARGE PLACEMENT REQUEST
"[facility]  CHRISTIAN HANKINS RN CM [phone]        Yuli Christian (79 y.o. Male)     Date of Birth Social Security Number Address Home Phone MRN    [id number]  [id number] [facility] [address] [phone] [id number]    [place] Marital Status          [facility] Single       Admission Date Admission Type Admitting Provider Attending Provider Department, Room/Bed    11/15/19 Emergency Delvin Lopez DO Hancock, John C, DO [address], 473/1    Discharge Date Discharge Disposition Discharge Destination         Skilled Nursing Facility (DC - External)              Attending Provider:  Delvin Lopez DO    Allergies:  Tetracycline    Isolation:  None   Infection:  None   Code Status:  CPR    Ht:  162.6 cm (64.02\")   Wt:  87.9 kg (193 lb 12.8 oz)    Admission Cmt:  None   Principal Problem:  Acute respiratory failure with hypoxia and hypercapnia (CMS/HCC) [J96.01,J96.02]                 Active Insurance as of 11/15/2019     Primary Coverage     Payor Plan Insurance Group Employer/Plan Group    MEDICARE MEDICARE A & B      Payor Plan Address Payor Plan Phone Number Payor Plan Fax Number Effective Dates    [address] [phone]  11/1/2007 - None Entered    [place] [address]       Subscriber Name Subscriber Birth Date Member ID       YULI CHRISTIAN 1940 [id number]                 Emergency Contacts      (Rel.) Home Phone Work Phone Mobile Phone    Jose Alejandro Leonardo (Daughter) [phone] -- --    HARDY FRANCOIS (Friend) [phone] -- --    LUIS FRANCOIS (Friend) [phone] -- --               Discharge Summary      Delvin Lopez DO at 11/22/19 [facility]                [facility] Medicine Services  DISCHARGE SUMMARY       Date of Admission: 11/15/2019  Date of Discharge:  11/22/2019  Primary Care Physician: Delvin Caldera MD    Presenting Problem/History of Present Illness:  Shortness of breath.     Final " Discharge Diagnoses:  Active Hospital Problems    Diagnosis   • **Acute respiratory failure with hypoxia and hypercapnia (CMS/HCC)   • COPD with acute exacerbation (CMS/HCC)   • Pulmonary nodule   • Acute diastolic heart failure (CMS/HCC)   • Microcytic anemia   • Hyperglycemia   • Alcoholism /alcohol abuse (CMS/HCC)   • Chronic constipation   • Essential hypertension     Consults: Dr. Ramey with pulmonary.     Procedures Performed:   Results for orders placed during the hospital encounter of 11/15/19   Adult Transthoracic Echo Complete With Contrast if Necessary Per Protocol    Narrative · Left ventricular systolic function is normal. Estimated EF appears to be   in the range of 61 - 65%.  · Left ventricular diastolic dysfunction (grade I) consistent with   impaired relaxation.  · Left ventricular wall thickness is consistent with mild concentric   hypertrophy.  · No hemodynamically significant valvular abnormalities identified on the   study.        Pertinent Test Results:   Imaging Results (Last 7 Days)     Procedure Component Value Units Date/Time    US Venous Doppler Lower Extremity Bilateral (duplex) [[id number]] Collected:  11/21/19 1536     Updated:  11/21/19 1539    Narrative:       History: Swelling       Impression:       Impression: There is no evidence of deep venous thrombosis or  superficial thrombophlebitis of right or left lower extremities.     Comments: Bilateral lower extremity venous duplex exam was performed  using color Doppler flow, Doppler waveform analysis, and grayscale  imaging, with and without compression. There is no evidence of deep  venous thrombosis in the common femoral, superficial femoral, popliteal,  peroneal, anterior tibial, and posterior tibial veins bilaterally. No  thrombus is identified in the saphenofemoral junctions and greater  saphenous veins bilaterally.         This report was finalized on 11/21/2019 15:36 by Dr. John Keane MD.    XR Chest 2 View [[id number]]  Collected:  11/20/19 1455     Updated:  11/20/19 1500    Narrative:       XR CHEST 2 VW- 11/20/2019 1:46 PM CST     HISTORY: worsening SOA, increased cough, abnormal sputum culture;  J96.01-Acute respiratory failure with hypoxia; J96.02-Acute respiratory  failure with hypercapnia; R26.9-Unspecified abnormalities of gait and  mobility       COMPARISON: Chest exam dated 11/17/2019.     FINDINGS:   Upright frontal and lateral radiographs of the chest were obtained.     Status post extubation. Minimal patchy densities overlying the  hemidiaphragms, thought to reflect a basilar atelectasis. There appear  to be bilateral trace layering effusions as well. Hyperexpanded lung  volumes with flattening of the hemidiaphragms, suggesting underlying  emphysema. The heart size is normal. The pulmonary vasculature are  nondilated. No acute bony abnormality.       Impression:       1. There appears to be mild bibasilar atelectasis with additional trace  layering pleural effusions. No obvious inflammatory infiltrate.     This report was finalized on 11/20/2019 14:57 by Dr Alexei Vanessa, .    XR Chest 1 View [[id number]] Updated:  11/18/19 0445    XR Chest 1 View [[id number]] Collected:  11/17/19 0932     Updated:  11/17/19 0936    Narrative:       EXAMINATION:  XR CHEST 1 VW-  11/17/2019 3:20 AM CST     HISTORY: on ventilator; J96.01-Acute respiratory failure with hypoxia;  J96.02-Acute respiratory failure with hypercapnia     COMPARISON: 11/15/2019.     FINDINGS:  There is a new NG tube extending to the stomach. The  endotracheal tube tip is at the T4-5 level. There is no dense  infiltrate. There is mild bronchial wall thickening. Heart size is upper  limits of normal.       Impression:       1. No focal infiltrate.  2. Mild bronchial wall thickening, stable.        This report was finalized on 11/17/2019 09:33 by Dr. Elias Pennington MD.    CT Angiogram Chest With & Without Contrast [[id number]] Collected:  11/16/19 3635     Updated:   11/16/19 1742    Narrative:       EXAMINATION:   CT ANGIOGRAM CHEST W WO CONTRAST-  11/16/2019 5:35 PM CST     HISTORY: CT chest angiogram dated 11/16/2019 5:09 PM CST      HISTORY: PE suspected     COMPARISON: None.      DLP: 560 mGy cm     TECHNIQUE: Helical tomographic images of the chest were obtained after  the administration of intravenous contrast following angiogram protocol.  Additionally, 3D MIP reconstructions in the coronal and sagittal planes  were provided.        FINDINGS:    The imaged portion of the base of the neck appears unremarkable.      There is adequate enhancement of the pulmonary arteries to evaluate for  central and segmental pulmonary emboli. There are no filling defects  within the main, lobar, segmental or visualized subsegmental pulmonary  arteries. The pulmonary vessels are within normal limits.      Small bilateral posterior pleural effusions are present. A 7 mm nodule  is present right upper lung on image 44. Left basilar atelectasis is  noted.. The trachea and bronchial tree are patent.      The heart and great vessels appear unremarkable. There is no pericardial  effusion.      No enlarged axillary or mediastinal lymph nodes are present.      The osseous structures of the thorax and surrounding soft tissues  demonstrate no acute process.      Nasogastric tube is present as a feeding tube and is satisfactorily  positioned.        Impression:       1. No evidence of embolic disease.        2. Infiltrate or atelectasis left lower lobe.        3. Small bilateral pleural effusions are noted.  4. 7 mm nodule left upper lobe. 6 month follow-up is suggested.        This report was finalized on 11/16/2019 17:39 by Dr. Kailash Crouch MD.    XR Abdomen KUB [[id number]] Collected:  11/16/19 1137     Updated:  11/16/19 1142    Narrative:       EXAMINATION:  XR ABDOMEN KUB-  11/16/2019 11:06 AM CST     HISTORY: OG tube placement.     COMPARISON: No comparison study.     TECHNIQUE: Supine image  of the chest and upper abdomen.      FINDINGS:   A gastric tube extends to the stomach. The side-port and tip  are in the stomach. The side-port is just below the gastroesophageal  junction. The tube could be advanced distally about 5 cm.       Impression:       OG tube placement, as described.        This report was finalized on 11/16/2019 11:39 by Dr. Elias Pennington MD.    XR Chest 1 View [[id number]] Collected:  11/15/19 2005     Updated:  11/15/19 2008    Narrative:       Frontal upright radiograph of the chest 11/15/2019 6:15 PM CST     COMPARISON: 11/15/2019 at 1:58 PM     HISTORY postintubation.     FINDINGS:   The lungs are clear. Cardiac silhouettes mildly enlarged. Endotracheal  tube is present satisfactorily position.      The osseous structures and surrounding soft tissues demonstrate no acute  abnormality.       Impression:       1. Mild cardiomegaly no evidence of pulmonary vascular congestion.        This report was finalized on 11/15/2019 20:05 by Dr. Kailash Crouch MD.    XR Chest 1 View [[id number]] Collected:  11/15/19 1413     Updated:  11/15/19 1417    Narrative:       Frontal upright radiograph of the chest 11/15/2019 2:09 PM CST     COMPARISON: 10/30/2019.     HISTORY: Short of breath.     FINDINGS:   Hyperinflation flattening diaphragms noted consistent with COPD.. The  cardiac silhouettes mildly enlarged but unchanged..      The osseous structures and surrounding soft tissues demonstrate no acute  abnormality.       Impression:       1. COPD no acute cardiopulmonary process.        This report was finalized on 11/15/2019 14:14 by Dr. Kailash Crouch MD.        Lab Results (last 7 days)     Procedure Component Value Units Date/Time    Basic Metabolic Panel [[id number]]  (Abnormal) Collected:  11/22/19 0425    Specimen:  Blood Updated:  11/22/19 0510     Glucose 146 mg/dL      BUN 30 mg/dL      Creatinine 1.27 mg/dL      Sodium 141 mmol/L      Potassium 4.1 mmol/L      Chloride 99 mmol/L      CO2  34.0 mmol/L      Calcium 8.8 mg/dL      eGFR Non African Amer 55 mL/min/1.73      BUN/Creatinine Ratio 23.6     Anion Gap 8.0 mmol/L     Narrative:       GFR Normal >60  Chronic Kidney Disease <60  Kidney Failure <15    CBC (No Diff) [[id number]]  (Abnormal) Collected:  11/22/19 0425    Specimen:  Blood Updated:  11/22/19 0500     WBC 8.48 10*3/mm3      RBC 4.49 10*6/mm3      Hemoglobin 9.1 g/dL      Hematocrit 32.4 %      MCV 72.2 fL      MCH 20.3 pg      MCHC 28.1 g/dL      RDW 21.8 %      RDW-SD 53.2 fl      MPV 10.2 fL      Platelets 407 10*3/mm3     Basic Metabolic Panel [[id number]]  (Abnormal) Collected:  11/21/19 0515    Specimen:  Blood Updated:  11/21/19 0601     Glucose 110 mg/dL      BUN 25 mg/dL      Creatinine 1.15 mg/dL      Sodium 142 mmol/L      Potassium 4.0 mmol/L      Chloride 98 mmol/L      CO2 37.0 mmol/L      Calcium 9.2 mg/dL      eGFR Non African Amer 62 mL/min/1.73      BUN/Creatinine Ratio 21.7     Anion Gap 7.0 mmol/L     Narrative:       GFR Normal >60  Chronic Kidney Disease <60  Kidney Failure <15    Blood Culture - Blood, Arm, Left [[id number]] Collected:  11/15/19 1355    Specimen:  Blood from Arm, Left Updated:  11/20/19 1431     Blood Culture No growth at 5 days    Blood Culture - Blood, Hand, Right [[id number]] Collected:  11/15/19 1359    Specimen:  Blood from Hand, Right Updated:  11/20/19 1431     Blood Culture No growth at 5 days    Basic Metabolic Panel [[id number]]  (Abnormal) Collected:  11/20/19 0613    Specimen:  Blood Updated:  11/20/19 0653     Glucose 88 mg/dL      BUN 21 mg/dL      Creatinine 0.92 mg/dL      Sodium 140 mmol/L      Potassium 4.0 mmol/L      Chloride 100 mmol/L      CO2 36.0 mmol/L      Calcium 9.3 mg/dL      eGFR Non African Amer 80 mL/min/1.73      BUN/Creatinine Ratio 22.8     Anion Gap 4.0 mmol/L     Narrative:       GFR Normal >60  Chronic Kidney Disease <60  Kidney Failure <15    CBC (No Diff) [[id number]]  (Abnormal) Collected:  11/20/19 0601     Specimen:  Blood Updated:  11/20/19 0639     WBC 10.53 10*3/mm3      RBC 4.99 10*6/mm3      Hemoglobin 9.8 g/dL      Hematocrit 36.6 %      MCV 73.3 fL      MCH 19.6 pg      MCHC 26.8 g/dL      RDW 20.6 %      RDW-SD 52.7 fl      MPV 9.8 fL      Platelets 399 10*3/mm3     Vitamin B12 [[id number]]  (Normal) Collected:  11/19/19 0553    Specimen:  Blood Updated:  11/19/19 2013     Vitamin B-12 869 pg/mL     Respiratory Culture - Sputum, Bronchus [[id number]]  (Abnormal)  (Susceptibility) Collected:  11/16/19 1604    Specimen:  Sputum from Bronchus Updated:  11/19/19 0959     Respiratory Culture Light growth (2+) Acinetobacter baumannii      Rare Normal Respiratory Richardson     Gram Stain Greater than 25 WBCs per low power field      Moderate (3+) Epithelial cells per low power field      Moderate (3+) Mixed gram positive richardson      Few (2+) Gram negative bacilli    Susceptibility      Acinetobacter baumannii     ADENIKE     Ampicillin + Sulbactam Susceptible     Cefepime Susceptible     Ceftazidime Susceptible     Gentamicin Susceptible     Piperacillin + Tazobactam Susceptible     Tetracycline Susceptible                    Ferritin [[id number]]  (Abnormal) Collected:  11/17/19 0241    Specimen:  Blood Updated:  11/18/19 0841     Ferritin 5.87 ng/mL     Iron Profile [[id number]]  (Abnormal) Collected:  11/17/19 0241    Specimen:  Blood Updated:  11/18/19 0841     Iron 11 mcg/dL      Iron Saturation 3 %      Transferrin 293 mg/dL      TIBC 437 mcg/dL     CBC Auto Differential [[id number]]  (Abnormal) Collected:  11/18/19 0545    Specimen:  Blood Updated:  11/18/19 0552     WBC 8.35 10*3/mm3      RBC 4.19 10*6/mm3      Hemoglobin 8.4 g/dL      Hematocrit 31.0 %      MCV 74.0 fL      MCH 20.0 pg      MCHC 27.1 g/dL      RDW 20.0 %      RDW-SD 53.1 fl      MPV 9.4 fL      Platelets 333 10*3/mm3      Neutrophil % 91.3 %      Lymphocyte % 4.0 %      Monocyte % 3.0 %      Eosinophil % 0.0 %      Basophil % 0.1 %      Neutrophils,  Absolute 7.63 10*3/mm3      Lymphocytes, Absolute 0.33 10*3/mm3      Monocytes, Absolute 0.25 10*3/mm3      Eosinophils, Absolute 0.00 10*3/mm3      Basophils, Absolute 0.01 10*3/mm3     Basic Metabolic Panel [[id number]]  (Abnormal) Collected:  11/17/19 0241    Specimen:  Blood Updated:  11/17/19 0307     Glucose 199 mg/dL      BUN 19 mg/dL      Creatinine 0.99 mg/dL      Sodium 141 mmol/L      Potassium 4.4 mmol/L      Chloride 105 mmol/L      CO2 26.0 mmol/L      Calcium 8.4 mg/dL      eGFR Non African Amer 73 mL/min/1.73      BUN/Creatinine Ratio 19.2     Anion Gap 10.0 mmol/L     Narrative:       GFR Normal >60  Chronic Kidney Disease <60  Kidney Failure <15    CBC Auto Differential [[id number]]  (Abnormal) Collected:  11/17/19 0241    Specimen:  Blood Updated:  11/17/19 0303     WBC 5.22 10*3/mm3      RBC 3.83 10*6/mm3      Hemoglobin 7.5 g/dL      Hematocrit 27.6 %      MCV 72.1 fL      MCH 19.6 pg      MCHC 27.2 g/dL      RDW 20.3 %      RDW-SD 51.8 fl      MPV 10.1 fL      Platelets 305 10*3/mm3      Neutrophil % 86.8 %      Lymphocyte % 8.6 %      Monocyte % 3.6 %      Eosinophil % 0.0 %      Basophil % 0.0 %      Immature Grans % 1.0 %      Neutrophils, Absolute 4.53 10*3/mm3      Lymphocytes, Absolute 0.45 10*3/mm3      Monocytes, Absolute 0.19 10*3/mm3      Eosinophils, Absolute 0.00 10*3/mm3      Basophils, Absolute 0.00 10*3/mm3      Immature Grans, Absolute 0.05 10*3/mm3      nRBC 2.7 /100 WBC     Blood Gas, Arterial With Co-Ox [[id number]]  (Abnormal) Collected:  11/17/19 0257    Specimen:  Arterial Blood Updated:  11/17/19 0302     Site Right Brachial     Dov's Test N/A     pH, Arterial 7.381 pH units      pCO2, Arterial 49.0 mm Hg      Comment: 83 Value above reference range        pO2, Arterial 75.4 mm Hg      Comment: 84 Value below reference range        HCO3, Arterial 29.0 mmol/L      Comment: 83 Value above reference range        Base Excess, Arterial 3.5 mmol/L      Comment: 83 Value above  reference range        O2 Saturation, Arterial 93.9 %      Comment: 84 Value below reference range        Hemoglobin, Blood Gas 7.5 g/dL      Comment: 84 Value below reference range        Hematocrit, Blood Gas 23.0 %      Comment: 84 Value below reference range        Oxyhemoglobin 91.7 %      Comment: 84 Value below reference range        Methemoglobin 1.30 %      Carboxyhemoglobin 1.0 %      A-a Gradiant 153.4 mmHg      Temperature 37.0 C      Sodium, Arterial 143 mmol/L      Potassium, Arterial 4.2 mmol/L      Barometric Pressure for Blood Gas 754 mmHg      Modality Ventilator     FIO2 40 %      Ventilator Mode AC     Set Tidal Volume 600     Set Mech Resp Rate 10.0     PEEP 5.0     Note --     Collected by [id number]     Comment: Meter: [id number]     :  [id number]        pH, Temp Corrected --     pCO2, Temperature Corrected --     pO2, Temperature Corrected --    Urine Drug Screen - Urine, Clean Catch [[id number]]  (Normal) Collected:  11/16/19 1101    Specimen:  Urine, Clean Catch Updated:  11/16/19 1123     THC, Screen, Urine Negative     Phencyclidine (PCP), Urine Negative     Cocaine Screen, Urine Negative     Methamphetamine, Ur Negative     Opiate Screen Negative     Amphetamine Screen, Urine Negative     Benzodiazepine Screen, Urine Negative     Tricyclic Antidepressants Screen Negative     Methadone Screen, Urine Negative     Barbiturates Screen, Urine Negative     Oxycodone Screen, Urine Negative     Propoxyphene Screen Negative     Buprenorphine, Screen, Urine Negative    Narrative:       Cutoff For Drugs Screened:    Amphetamines               500 ng/ml  Barbiturates               200 ng/ml  Benzodiazepines            150 ng/ml  Cocaine                    150 ng/ml  Methadone                  200 ng/ml  Opiates                    100 ng/ml  Phencyclidine               25 ng/ml  THC                            50 ng/ml  Methamphetamine            500 ng/ml  Tricyclic Antidepressants  300  ng/ml  Oxycodone                  100 ng/ml  Propoxyphene               300 ng/ml  Buprenorphine               10 ng/ml    The normal value for all drugs tested is negative. This report includes unconfirmed screening results, with the cutoff values listed, to be used for medical treatment purposes only.  Unconfirmed results must not be used for non-medical purposes such as employment or legal testing.  Clinical consideration should be applied to any drug of abuse test, particularly when unconfirmed results are used.      Comprehensive Metabolic Panel [[id number]]  (Abnormal) Collected:  11/16/19 0704    Specimen:  Blood Updated:  11/16/19 0737     Glucose 171 mg/dL      BUN 16 mg/dL      Creatinine 1.03 mg/dL      Sodium 142 mmol/L      Potassium 4.3 mmol/L      Chloride 104 mmol/L      CO2 30.0 mmol/L      Calcium 8.5 mg/dL      Total Protein 6.8 g/dL      Albumin 3.10 g/dL      ALT (SGPT) 13 U/L      AST (SGOT) 23 U/L      Alkaline Phosphatase 119 U/L      Total Bilirubin 0.6 mg/dL      eGFR Non African Amer 70 mL/min/1.73      Globulin 3.7 gm/dL      A/G Ratio 0.8 g/dL      BUN/Creatinine Ratio 15.5     Anion Gap 8.0 mmol/L     Narrative:       GFR Normal >60  Chronic Kidney Disease <60  Kidney Failure <15    Lipid Panel [[id number]]  (Abnormal) Collected:  11/16/19 0704    Specimen:  Blood Updated:  11/16/19 0737     Total Cholesterol 103 mg/dL      Triglycerides 145 mg/dL      HDL Cholesterol 25 mg/dL      LDL Cholesterol  49 mg/dL      VLDL Cholesterol 29 mg/dL      LDL/HDL Ratio 1.96    Narrative:       Cholesterol Reference Ranges  (U.S. Department of Health and Human Services ATP III Classifications)    Desirable          <200 mg/dL  Borderline High    200-239 mg/dL  High Risk          >240 mg/dL      Triglyceride Reference Ranges  (U.S. Department of Health and Human Services ATP III Classifications)    Normal           <150 mg/dL  Borderline High  150-199 mg/dL  High             200-499 mg/dL  Very High         >500 mg/dL    HDL Reference Ranges  (U.S. Department of Health and Human Services ATP III Classifcations)    Low     <40 mg/dl (major risk factor for CHD)  High    >60 mg/dl ('negative' risk factor for CHD)        LDL Reference Ranges  (U.S. Department of Health and Human Services ATP III Classifcations)    Optimal          <100 mg/dL  Near Optimal     100-129 mg/dL  Borderline High  130-159 mg/dL  High             160-189 mg/dL  Very High        >189 mg/dL    TSH [[id number]]  (Normal) Collected:  11/16/19 0704    Specimen:  Blood Updated:  11/16/19 0737     TSH 0.407 uIU/mL     CBC Auto Differential [[id number]]  (Abnormal) Collected:  11/16/19 0704    Specimen:  Blood Updated:  11/16/19 0729     WBC 4.97 10*3/mm3      RBC 4.17 10*6/mm3      Hemoglobin 8.1 g/dL      Hematocrit 29.9 %      MCV 71.7 fL      MCH 19.4 pg      MCHC 27.1 g/dL      RDW 20.2 %      RDW-SD 51.7 fl      MPV 9.9 fL      Platelets 376 10*3/mm3      Neutrophil % 79.3 %      Lymphocyte % 17.3 %      Monocyte % 2.4 %      Eosinophil % 0.0 %      Basophil % 0.2 %      Neutrophils, Absolute 3.94 10*3/mm3      Lymphocytes, Absolute 0.86 10*3/mm3      Monocytes, Absolute 0.12 10*3/mm3      Eosinophils, Absolute 0.00 10*3/mm3      Basophils, Absolute 0.01 10*3/mm3     Hemoglobin A1c [[id number]]  (Abnormal) Collected:  11/16/19 0704    Specimen:  Blood Updated:  11/16/19 0726     Hemoglobin A1C 6.00 %     Narrative:       Hemoglobin A1C Ranges:    Increased Risk for Diabetes  5.7% to 6.4%  Diabetes                     >= 6.5%  Diabetic Goal                < 7.0%    Blood Gas, Arterial [[id number]]  (Abnormal) Collected:  11/16/19 0349    Specimen:  Arterial Blood Updated:  11/16/19 0358     Site Right Radial     Dov's Test Positive     pH, Arterial 7.489 pH units      Comment: 83 Value above reference range        pCO2, Arterial 41.1 mm Hg      pO2, Arterial 56.8 mm Hg      Comment: 84 Value below reference range        HCO3, Arterial 31.2 mmol/L       Comment: 83 Value above reference range        Base Excess, Arterial 7.2 mmol/L      Comment: 83 Value above reference range        O2 Saturation, Arterial 90.9 %      Comment: 84 Value below reference range        Temperature 37.0 C      Barometric Pressure for Blood Gas 758 mmHg      Modality Ventilator     FIO2 40 %      Ventilator Mode AC     Set Tidal Volume 600     Set Mech Resp Rate 18.0     PEEP 5.0     Collected by [id number]     Comment: Meter: [id number]     :  [id number]       Blood Gas, Arterial [[id number]]  (Abnormal) Collected:  11/15/19 2043    Specimen:  Arterial Blood Updated:  11/15/19 2053     Site Right Brachial     Dov's Test N/A     pH, Arterial 7.291 pH units      Comment: 84 Value below reference range        pCO2, Arterial 66.8 mm Hg      Comment: 83 Value above reference range        pO2, Arterial 121.0 mm Hg      Comment: 83 Value above reference range        HCO3, Arterial 32.2 mmol/L      Comment: 83 Value above reference range        Base Excess, Arterial 4.7 mmol/L      Comment: 83 Value above reference range        O2 Saturation, Arterial 98.5 %      Temperature 37.0 C      Barometric Pressure for Blood Gas 759 mmHg      Modality Ventilator     FIO2 60 %      Ventilator Mode AC     Set Tidal Volume 600     Set Mech Resp Rate 14.0     PEEP 5.0     Collected by [id number]     Comment: Meter: [id number]     :  [id number]       Blood Gas, Arterial [[id number]]  (Abnormal) Collected:  11/15/19 1630    Specimen:  Arterial Blood Updated:  11/15/19 1640     Site Right Radial     Dov's Test Positive     pH, Arterial 7.309 pH units      Comment: 84 Value below reference range        pCO2, Arterial 66.1 mm Hg      Comment: 83 Value above reference range        pO2, Arterial 76.8 mm Hg      Comment: 84 Value below reference range        HCO3, Arterial 33.2 mmol/L      Comment: 83 Value above reference range        Base Excess, Arterial 5.9 mmol/L      Comment: 83 Value  above reference range        O2 Saturation, Arterial 94.4 %      Temperature 37.0 C      Barometric Pressure for Blood Gas 759 mmHg      Modality BiPap     FIO2 30 %      Ventilator Mode NA     Set Mech Resp Rate 16.0     IPAP 20     Comment: Meter: [id number]     :  [id number]        EPAP 6     Collected by [id number]    Blood Gas, Arterial [[id number]]  (Abnormal) Collected:  11/15/19 1514    Specimen:  Arterial Blood Updated:  11/15/19 1518     Site Left Radial     Dov's Test Positive     pH, Arterial 7.321 pH units      Comment: 84 Value below reference range        pCO2, Arterial 64.5 mm Hg      Comment: 83 Value above reference range        pO2, Arterial 74.0 mm Hg      Comment: 84 Value below reference range        HCO3, Arterial 33.3 mmol/L      Comment: 83 Value above reference range        Base Excess, Arterial 6.2 mmol/L      Comment: 83 Value above reference range        O2 Saturation, Arterial 93.9 %      Comment: 84 Value below reference range        Temperature 37.0 C      Barometric Pressure for Blood Gas 759 mmHg      Modality Heated HFNC     FIO2 35 %      Flow Rate 40.0 lpm      Ventilator Mode NA     Collected by [id number]     Comment: Meter: [id number]     :  [id number]       D-dimer, Quantitative [[id number]]  (Abnormal) Collected:  11/15/19 1355    Specimen:  Blood Updated:  11/15/19 1501     D-Dimer, Quantitative 1.81 mg/L (FEU)     Narrative:       Reference Range is 0-0.50 mg/L FEU. However, results <0.50 mg/L FEU tends to rule out DVT or PE. Results >0.50 mg/L FEU are not useful in predicting absence or presence of DVT or PE.    Manual Differential [[id number]]  (Abnormal) Collected:  11/15/19 1355    Specimen:  Blood Updated:  11/15/19 1443     Neutrophil % 72.7 %      Lymphocyte % 8.1 %      Monocyte % 4.0 %      Eosinophil % 12.1 %      Basophil % 2.0 %      Atypical Lymphocyte % 1.0 %      Neutrophils Absolute 4.78 10*3/mm3      Lymphocytes Absolute 0.53 10*3/mm3       Monocytes Absolute 0.26 10*3/mm3      Eosinophils Absolute 0.79 10*3/mm3      Basophils Absolute 0.13 10*3/mm3      nRBC 4.0 /100 WBC      Anisocytosis Slight/1+     Hypochromia Mod/2+     Microcytes Slight/1+     Poikilocytes Slight/1+     Polychromasia Slight/1+     WBC Morphology Normal     Giant Platelets Large/3+    CBC Auto Differential [[id number]]  (Abnormal) Collected:  11/15/19 1355    Specimen:  Blood Updated:  11/15/19 1443     WBC 6.57 10*3/mm3      RBC 4.34 10*6/mm3      Hemoglobin 8.5 g/dL      Hematocrit 32.4 %      MCV 74.7 fL      MCH 19.6 pg      MCHC 26.2 g/dL      RDW 20.5 %      RDW-SD 54.5 fl      MPV 9.5 fL      Platelets 372 10*3/mm3     Comprehensive Metabolic Panel [[id number]]  (Abnormal) Collected:  11/15/19 1355    Specimen:  Blood Updated:  11/15/19 1439     Glucose 128 mg/dL      BUN 14 mg/dL      Creatinine 1.06 mg/dL      Sodium 144 mmol/L      Potassium 3.8 mmol/L      Chloride 103 mmol/L      CO2 33.0 mmol/L      Calcium 9.0 mg/dL      Total Protein 7.6 g/dL      Albumin 3.70 g/dL      ALT (SGPT) 17 U/L      AST (SGOT) 27 U/L      Alkaline Phosphatase 145 U/L      Total Bilirubin 0.6 mg/dL      eGFR Non African Amer 68 mL/min/1.73      Globulin 3.9 gm/dL      A/G Ratio 0.9 g/dL      BUN/Creatinine Ratio 13.2     Anion Gap 8.0 mmol/L     Narrative:       GFR Normal >60  Chronic Kidney Disease <60  Kidney Failure <15    BNP [[id number]]  (Normal) Collected:  11/15/19 1355    Specimen:  Blood Updated:  11/15/19 1435     proBNP 1,550.0 pg/mL     Narrative:       Among patients with dyspnea, NT-proBNP is highly sensitive for the detection of acute congestive heart failure. In addition NT-proBNP of <300 pg/ml effectively rules out acute congestive heart failure with 99% negative predictive value.    Troponin [[id number]]  (Normal) Collected:  11/15/19 1355    Specimen:  Blood Updated:  11/15/19 1435     Troponin T 0.012 ng/mL     Narrative:       Troponin T Reference Range:  <= 0.03 ng/mL-    Negative for AMI  >0.03 ng/mL-     Abnormal for myocardial necrosis.  Clinicians would have to utilize clinical acumen, EKG, Troponin and serial changes to determine if it is an Acute Myocardial Infarction or myocardial injury due to an underlying chronic condition.     Magnesium [[id number]]  (Normal) Collected:  11/15/19 1355    Specimen:  Blood Updated:  11/15/19 1435     Magnesium 2.0 mg/dL     Lactic Acid, Plasma [[id number]]  (Normal) Collected:  11/15/19 1355    Specimen:  Blood Updated:  11/15/19 1431     Lactate 1.1 mmol/L     Blood Gas, Arterial [[id number]]  (Abnormal) Collected:  11/15/19 1356    Specimen:  Arterial Blood Updated:  11/15/19 1401     Site Left Radial     Dov's Test Positive     pH, Arterial 7.355 pH units      pCO2, Arterial 57.5 mm Hg      Comment: 83 Value above reference range        pO2, Arterial 45.9 mm Hg      Comment: 85 Value below critical limit        HCO3, Arterial 32.1 mmol/L      Comment: 83 Value above reference range        Base Excess, Arterial 5.7 mmol/L      Comment: 83 Value above reference range        O2 Saturation, Arterial 80.0 %      Comment: 84 Value below reference range        Temperature 37.0 C      Barometric Pressure for Blood Gas 759 mmHg      Modality Room Air     Ventilator Mode NA     Notified Who DR YOU     Notified By [id number]     Notified Time 11/15/2019 14:01     Collected by [id number]     Comment: Meter: [id number]     :  [id number]           Hospital Course:  The patient was originally admitted on 11/15 by Charmaine Yao and Dr. Lamb.  He presented to the emergency department with shortness of breath. He was felt to be having an exacerbation of chronic obstructive pulmonary disease secondary to long-standing and ongoing tobacco abuse.  His carbon dioxide retention and pH continued to worsen.  Trials of BiPAP were unsuccessful at controlling this.  He ultimately required intubation mechanical ventilation.  He was extubated on  11/17.     He was followed by the pulmonary service.  He will require 2 L nasal cannula at discharge.     He has finished Rocephin.  He has grown pan susceptible Acinetobacter on sputum culture.  He does not have a clear pneumonia.  This is likely colonization. Mucinex and incentive spirometry. Continue inhaled bronchodilators. Started Symbicort on 11/18. Weaned Solu-Medrol to oral prednisone on 11/19.     Counseled for tobacco cessation.  Nicotine patch offered.     He will need a repeat noncontrasted CT scan of the chest in 6 months to reevaluate his left upper lobe pulmonary nodule.     PA lateral chest x-ray on 11/20 showed mild bibasilar atelectasis with additional trace layering pleural effusions with no obvious inflammatory infiltrate.     Started lisinopril on 11/19 and have uptitrated.   Added metoprolol tartrate on 11/20.  He has some diastolic dysfunction on echocardiogram.  His BNP was normal at presentation, but he has had some complaints of scrotal edema and now has worsening lower extremity edema. Lasix IV BID was given for a few days with improvements.  He will be discharged on 40 mg of oral Lasix on a daily basis.  He has been encouraged to restrict his fluid intake somewhat and elevate his legs when not active.     He was hyperglycemic secondary to steroids. Hemoglobin A1c 6.0.      The patient appears to have a chronic microcytic anemia.  He has been transfused 1 unit packed red blood cells on 11/17.  Anemia substrates were not assessed prior to the administration of blood products, but I was able to add them to old blood.  He is significantly iron deficient.  IV Venofer.  Dr. Caldera had him set up to have an outpatient colonoscopy on 11/18, however, he was obviously admitted.  This will need to be rescheduled as an outpatient for further evaluation of his chronic microcytic, iron deficient anemia.     B12 normal.     Bowel regimen in place.      Lovenox was used for DVT prophylaxis.     Pepcid was  "used for peptic ulcer prophylaxis.     He has been accepted to a skilled nursing facility and per year, [place] today. It is called [facility].  This is close to his home.  This is also close to his sister and his daughter.  His ultimate plan is to go there for strengthening and continued medical tuning.  He ultimately plans to return home with home health and assistance from friends and family.    Physical Exam on Discharge:  /64 (BP Location: Right arm, Patient Position: Sitting)   Pulse 77   Temp 98 °F (36.7 °C) (Oral)   Resp 16   Ht 162.6 cm (64.02\")   Wt 87.9 kg (193 lb 12.8 oz)   SpO2 96%   BMI 33.25 kg/m²    Physical Exam  Constitutional: He is oriented to person, place, and time. He appears well-developed and well-nourished. Up on the side of the bed.  No distress.  No family present. Discussed with his nurse, Lauren.    Head: Normocephalic and atraumatic.   Eyes: Conjunctivae and EOM are normal. Pupils are equal, round, and reactive to light.   Neck: Neck supple. No JVD present.   Cardiovascular: Normal rate, regular rhythm, normal heart sounds and intact distal pulses. Exam reveals no gallop and no friction rub.   No murmur heard.  Pulmonary/Chest: Effort normal. No respiratory distress. He has wheezes (slight). He has no rales. He exhibits no tenderness. 2L.   Abdominal: Soft. Bowel sounds are normal. He exhibits distension. There is no tenderness. There is no rebound and no guarding.   Musculoskeletal: Normal range of motion. He exhibits no edema, tenderness or deformity. Trace to 1+ LE edema ( improved from yesterday) and with improving scrotal edema.    Neurological: He is alert and oriented to person, place, and time. He displays normal reflexes. No cranial nerve deficit. He exhibits normal muscle tone.   Skin: Skin is warm and dry. No rash noted.   Psychiatric: He has a normal mood and affect. His behavior is normal. Judgment and thought content normal.     Condition on Discharge: Good. "     Discharge Disposition:  Skilled Nursing Facility (DC - External): [facility] in [place], [place].     Discharge Medications:     Discharge Medications      New Medications      Instructions Start Date   aluminum-magnesium hydroxide-simethicone 400-400-40 MG/5ML suspension  Commonly known as:  MAALOX MAX   15 mL, Oral, Every 6 Hours PRN      bisacodyl 5 MG EC tablet  Commonly known as:  DULCOLAX   10 mg, Oral, Daily PRN      budesonide-formoterol 160-4.5 MCG/ACT inhaler  Commonly known as:  SYMBICORT   2 puffs, Inhalation, 2 Times Daily - RT      ferrous sulfate 325 (65 FE) MG tablet  Commonly known as:  FERROUSUL   325 mg, Oral, Daily With Breakfast      furosemide 40 MG tablet  Commonly known as:  LASIX   40 mg, Oral, Daily      guaiFENesin 600 MG 12 hr tablet  Commonly known as:  MUCINEX   1,200 mg, Oral, Every 12 Hours Scheduled      HYDROcodone-acetaminophen 5-325 MG per tablet  Commonly known as:  NORCO   1 tablet, Oral, Every 6 Hours PRN      hydrocortisone 25 MG suppository  Commonly known as:  ANUSOL-HC   25 mg, Rectal, 2 Times Daily      hydrOXYzine 50 MG tablet  Commonly known as:  ATARAX   50 mg, Oral, Nightly PRN      ipratropium-albuterol 0.5-2.5 mg/3 ml nebulizer  Commonly known as:  DUO-NEB   3 mL, Nebulization, Every 6 Hours - RT      lisinopril 10 MG tablet  Commonly known as:  PRINIVIL,ZESTRIL   10 mg, Oral, Every 24 Hours Scheduled   Start Date:  11/23/2019     metoprolol tartrate 25 MG tablet  Commonly known as:  LOPRESSOR   25 mg, Oral, Every 12 Hours Scheduled      nicotine 21 MG/24HR patch  Commonly known as:  NICODERM CQ   1 patch, Transdermal, Nightly      ondansetron 4 MG tablet  Commonly known as:  ZOFRAN   4 mg, Oral, Every 6 Hours PRN      polyethylene glycol pack packet  Commonly known as:  MIRALAX   17 g, Oral, Daily   Start Date:  11/23/2019     predniSONE 10 MG tablet  Commonly known as:  DELTASONE   Take 4 tablets daily for 3 days, then take 2 tablets daily for 3 days, then take 1  tablet daily for 3 days, then stop.           Discharge Diet:   Diet Instructions     Diet: Cardiac; Thin      Discharge Diet:  Cardiac    Fluid Consistency:  Thin        Activity at Discharge:   Activity Instructions     Activity as Tolerated          Follow-up Appointments:   1. PCP or SNF physician in 1 week.   2. Dr. Ramey per him.   3. PCP to reschedule his missed colonoscopy when medically stable.  4.  PCP or Dr. Ramey to get a noncontrast CT in 6 months to reevaluate pulmonary nodularity.    Test Results Pending at Discharge: None.     Delvin Lopez DO  11/22/19  11:46 AM    Time: 40 minutes.           Electronically signed by Delvin Lopez DO at 11/22/19 1397

## 2019-11-22 NOTE — PLAN OF CARE
Problem: Skin Injury Risk (Adult)  Goal: Skin Health and Integrity  Outcome: Ongoing (interventions implemented as appropriate)      Problem: Fall Risk (Adult)  Goal: Absence of Fall  Outcome: Ongoing (interventions implemented as appropriate)      Problem: Patient Care Overview  Goal: Plan of Care Review  Outcome: Ongoing (interventions implemented as appropriate)   11/22/19 0640   Coping/Psychosocial   Plan of Care Reviewed With patient   Plan of Care Review   Progress no change   OTHER   Outcome Summary Pt c/o back pain. Noroc prn given. Oxygen at 2L NC. SOB with exertion. Mild edemat to lower extremities.      Goal: Individualization and Mutuality  Outcome: Ongoing (interventions implemented as appropriate)    Goal: Discharge Needs Assessment  Outcome: Ongoing (interventions implemented as appropriate)    Goal: Interprofessional Rounds/Family Conf  Outcome: Ongoing (interventions implemented as appropriate)      Problem: Nutrition, Enteral (Adult)  Goal: Signs and Symptoms of Listed Potential Problems Will be Absent, Minimized or Managed (Nutrition, Enteral)  Outcome: Ongoing (interventions implemented as appropriate)

## 2019-11-22 NOTE — PROGRESS NOTES
Discharge Planning Assessment  [facility]     Patient Name: Rodrigo Javed  MRN: [id number]  Today's Date: 11/22/2019    Admit Date: 11/15/2019    Discharge Needs Assessment    No documentation.       Discharge Plan     Row Name 11/22/19 1224       Plan    Plan  [facility]    Patient/Family in Agreement with Plan  yes    Final Discharge Disposition Code  03 - skilled nursing facility (SNF)    Final Note  Pt is being discharged to [facility] today.  Informed Isyalia at facility [phone] of d/c today, pt will be admitted at SNF level care. Pt aware of d/c and so is his dtr, [facility] [phone].  Pt will need to travel via [facility] [phone].        Destination      No service coordination in this encounter.      Durable Medical Equipment      No service coordination in this encounter.      Dialysis/Infusion      No service coordination in this encounter.      Home Medical Care      No service coordination in this encounter.      Therapy      No service coordination in this encounter.      Community Resources      No service coordination in this encounter.        Expected Discharge Date and Time     Expected Discharge Date Expected Discharge Time    Nov 22, 2019         Demographic Summary    No documentation.       Functional Status    No documentation.       Psychosocial    No documentation.       Abuse/Neglect    No documentation.       Legal    No documentation.       Substance Abuse    No documentation.       Patient Forms    No documentation.           MELODY Phelps

## 2019-11-22 NOTE — PROGRESS NOTES
PULMONARY AND CRITICAL CARE PROGRESS NOTE - [facility]    Patient: Rodrigo Javed    1940    MR# [id number]    Acct# [id number]  11/22/19   11:26 AM  Referring Provider: Delvin Lopez DO    Chief Complaint: Mechanically ventilated    Interval history: He is sitting on side of bed eating breakfast. He states he feels better today. He is stating he wants to quit smoking. He admits he has some family issues/ problems he has to deal with. He is on 2L NC 93% sat. He is afebrile. No family at bedside. No new CXR or ABGs.       Meds:    budesonide-formoterol 2 puff Inhalation BID - RT   enoxaparin 40 mg Subcutaneous Q24H   famotidine 20 mg Oral BID AC   furosemide 40 mg Intravenous Q12H   guaiFENesin 1,200 mg Oral Q12H   hydrocortisone 25 mg Rectal BID   ipratropium-albuterol 3 mL Nebulization Q6H - RT   lisinopril 10 mg Oral Q24H   metoprolol tartrate 12.5 mg Oral Q12H   nicotine 1 patch Transdermal Nightly   polyethylene glycol 17 g Oral Daily   predniSONE 20 mg Oral Daily   sodium chloride 10 mL Intravenous Q12H        Review of Systems:     Review of Systems - General ROS: negative for - chills or fever  Respiratory ROS: positive for - shortness of breath and wheezing  Cardiovascular ROS: negative for - chest pain or edema  Gastrointestinal ROS: no abdominal pain, change in bowel habits, or black or bloody stools  Musculoskeletal ROS: negative for - gait disturbance , positive for pain all over-improved today    Dermatological ROS: negative for pruritus and rash  Neurological: ROS: positive for weakness    Ventilator Settings:     Vt (Set, L): 0.6 L  Resp Rate (Set): 10  Pressure Support (cm H2O): 5 cm H20  FiO2 (%): 35 %  PEEP/CPAP (cm H2O): 5 cm H20  Minute Ventilation (L/min) (Obs): 11.5 L/min  Resp Rate (Observed) Vent: 16  I:E Ratio (Set): 1:1.40  I:E Ratio (Obs): 1:1.8  PIP Observed (cm H2O): 11 cm H2O  RSBI: 42  Physical Exam:  Temp:  [98 °F (36.7 °C)-98.6 °F (37 °C)] 98 °F (36.7  °C)  Heart Rate:  [] 87  Resp:  [16-20] 16  BP: ()/(59-64) 138/64    Intake/Output Summary (Last 24 hours) at 11/22/2019 1126  Last data filed at 11/22/2019 1051  Gross per 24 hour   Intake 700 ml   Output 1925 ml   Net -1225 ml     SpO2 Percentage    11/22/19 0453 11/22/19 0732 11/22/19 0809   SpO2: 91% 93% 92%      Physical Exam   Constitutional: He is oriented to person, place, and time. He appears well-developed and well-nourished. He does not have a sickly appearance. He does not appear ill. No distress. Nasal cannula in place.   HENT:   Head: Normocephalic and atraumatic.   Eyes: No scleral icterus.   Cardiovascular: Normal rate and regular rhythm.   No murmur heard.  Pulmonary/Chest: Effort normal. No accessory muscle usage. No respiratory distress. He has decreased breath sounds. He has wheezes. He has no rhonchi.   Musculoskeletal: He exhibits edema.   Neurological: He is alert and oriented to person, place, and time.   Skin: He is not diaphoretic.   Psychiatric: He has a normal mood and affect. His mood appears not anxious. He is not agitated.     Results from last 7 days   Lab Units 11/22/19  0425 11/20/19  0613 11/18/19  0545   WBC 10*3/mm3 8.48 10.53 8.35   HEMOGLOBIN g/dL 9.1* 9.8* 8.4*   PLATELETS 10*3/mm3 407 399 333     Results from last 7 days   Lab Units 11/22/19  0425 11/21/19  0515 11/20/19  0613   SODIUM mmol/L 141 142 140   POTASSIUM mmol/L 4.1 4.0 4.0   BUN mg/dL 30* 25* 21   CREATININE mg/dL 1.27 1.15 0.92     Results from last 7 days   Lab Units 11/17/19  0257 11/16/19  0349 11/15/19  2043   PH, ARTERIAL pH units 7.381 7.489* 7.291*   PCO2, ARTERIAL mm Hg 49.0* 41.1 66.8*   PO2 ART mm Hg 75.4* 56.8* 121.0*   FIO2 % 40 40 60     Blood Culture   Date Value Ref Range Status   11/15/2019 No growth at 24 hours  Preliminary   11/15/2019 No growth at 24 hours  Preliminary   Recent films:  Xr Chest 2 View    Result Date: 11/20/2019  1. There appears to be mild bibasilar atelectasis with  additional trace layering pleural effusions. No obvious inflammatory infiltrate.  This report was finalized on 11/20/2019 14:57 by Dr Alexei Vanessa, .    Us Venous Doppler Lower Extremity Bilateral (duplex)    Result Date: 11/21/2019  Impression: There is no evidence of deep venous thrombosis or superficial thrombophlebitis of right or left lower extremities.  Comments: Bilateral lower extremity venous duplex exam was performed using color Doppler flow, Doppler waveform analysis, and grayscale imaging, with and without compression. There is no evidence of deep venous thrombosis in the common femoral, superficial femoral, popliteal, peroneal, anterior tibial, and posterior tibial veins bilaterally. No thrombus is identified in the saphenofemoral junctions and greater saphenous veins bilaterally.   This report was finalized on 11/21/2019 15:36 by Dr. John Keane MD.    Films reviewed personally by me.  My interpretation: no new imaging.       Pulmonary Assessment:    1. Severe acute respiratory failure with hypercapnia, improved, hypoxia compensated  2. Personal history of tobacco abuse  3. Possible substantial COPD  4. Probable chronic respiratory acidosis    Recommend:     · O2 walks with PT    · Encourage IS and flutter   · Continue Mucinex  · Continue bronchodilators and antibiotics, empiric  · Lovenox for DVT prophylaxis   · Pepcid for GI prophylaxis   · Increase activity   · Discussed Nicotine patches/ gum to assist with tobacco cessation   · Discussed/ encouraged Pulmonary Rehab and he would need to do this in [place]     Electronically signed by DAAN Glasgow on 11/22/2019 at 11:26 AM     Physician substantive portion:  Patient indicates he is breathing somewhat better today after receiving additional IV steroids yesterday.  No nausea or vomiting.  Exam shows no distress wheezing is improved.  Plan continue nicotine replacement ambulate.  Pulmonary rehabilitation in [place].  I will put  this in his discharge orders.    I have seen and examined patient personally, performing a face-to-face diagnostic evaluation with plan of care reviewed and developed with APRN and nursing staff. I have addended and/or modified the above history of present illness, physical examination, and assessment and plan to reflect my findings and impressions. Essential elements of the care plan were discussed with APRN above.  Agree with findings and assessment/plan as documented above.    Electronically signed by Waylon Ramey MD, on 11/22/2019, 2:36 PM

## 2019-11-22 NOTE — DISCHARGE PLACEMENT REQUEST
"  Antonieta Whitfield [phone]  Yuli Christian (79 y.o. Male)     Date of Birth Social Security Number Address Home Phone MRN    [id number]  [id number] [place] [id number] [phone] [id number]    Zoroastrian Marital Status          [facility] Single       Admission Date Admission Type Admitting Provider Attending Provider Department, Room/Bed    11/15/19 Emergency Delvin Lopez DO Hancock, John C, DO [address], 473/1    Discharge Date Discharge Disposition Discharge Destination         Skilled Nursing Facility (DC - External)              Attending Provider:  Delvin Lopez DO    Allergies:  Tetracycline    Isolation:  None   Infection:  None   Code Status:  CPR    Ht:  162.6 cm (64.02\")   Wt:  87.9 kg (193 lb 12.8 oz)    Admission Cmt:  None   Principal Problem:  Acute respiratory failure with hypoxia and hypercapnia (CMS/HCC) [J96.01,J96.02]                 Active Insurance as of 11/15/2019     Primary Coverage     Payor Plan Insurance Group Employer/Plan Group    MEDICARE MEDICARE A & B      Payor Plan Address Payor Plan Phone Number Payor Plan Fax Number Effective Dates    [address] [phone]  11/1/2007 - None Entered    [place] [address]       Subscriber Name Subscriber Birth Date Member ID       YULI CHRISTIAN 1940 [id number]                 Emergency Contacts      (Rel.) Home Phone Work Phone Mobile Phone    Jose Alejandro Leonardo (Daughter) [phone] -- --    HARDY FRANCOIS (Friend) [phone] -- --    LUIS FRANCOIS (Friend) [phone] -- --               Discharge Summary      Delvin Lopez DO at 11/22/19 [facility]                [facility]  DISCHARGE SUMMARY       Date of Admission: 11/15/2019  Date of Discharge:  11/22/2019  Primary Care Physician: Delvin Caldera MD    Presenting Problem/History of Present Illness:  Shortness of breath.     Final Discharge Diagnoses:  Active Hospital " Problems    Diagnosis   • **Acute respiratory failure with hypoxia and hypercapnia (CMS/HCC)   • COPD with acute exacerbation (CMS/HCC)   • Pulmonary nodule   • Acute diastolic heart failure (CMS/HCC)   • Microcytic anemia   • Hyperglycemia   • Alcoholism /alcohol abuse (CMS/HCC)   • Chronic constipation   • Essential hypertension     Consults: Dr. Ramey with pulmonary.     Procedures Performed:   Results for orders placed during the hospital encounter of 11/15/19   Adult Transthoracic Echo Complete With Contrast if Necessary Per Protocol    Narrative · Left ventricular systolic function is normal. Estimated EF appears to be   in the range of 61 - 65%.  · Left ventricular diastolic dysfunction (grade I) consistent with   impaired relaxation.  · Left ventricular wall thickness is consistent with mild concentric   hypertrophy.  · No hemodynamically significant valvular abnormalities identified on the   study.        Pertinent Test Results:   Imaging Results (Last 7 Days)     Procedure Component Value Units Date/Time    US Venous Doppler Lower Extremity Bilateral (duplex) [[id number]] Collected:  11/21/19 1536     Updated:  11/21/19 1539    Narrative:       History: Swelling       Impression:       Impression: There is no evidence of deep venous thrombosis or  superficial thrombophlebitis of right or left lower extremities.     Comments: Bilateral lower extremity venous duplex exam was performed  using color Doppler flow, Doppler waveform analysis, and grayscale  imaging, with and without compression. There is no evidence of deep  venous thrombosis in the common femoral, superficial femoral, popliteal,  peroneal, anterior tibial, and posterior tibial veins bilaterally. No  thrombus is identified in the saphenofemoral junctions and greater  saphenous veins bilaterally.         This report was finalized on 11/21/2019 15:36 by Dr. John Keane MD.    XR Chest 2 View [[id number]] Collected:  11/20/19 1705     Updated:   11/20/19 1500    Narrative:       XR CHEST 2 VW- 11/20/2019 1:46 PM CST     HISTORY: worsening SOA, increased cough, abnormal sputum culture;  J96.01-Acute respiratory failure with hypoxia; J96.02-Acute respiratory  failure with hypercapnia; R26.9-Unspecified abnormalities of gait and  mobility       COMPARISON: Chest exam dated 11/17/2019.     FINDINGS:   Upright frontal and lateral radiographs of the chest were obtained.     Status post extubation. Minimal patchy densities overlying the  hemidiaphragms, thought to reflect a basilar atelectasis. There appear  to be bilateral trace layering effusions as well. Hyperexpanded lung  volumes with flattening of the hemidiaphragms, suggesting underlying  emphysema. The heart size is normal. The pulmonary vasculature are  nondilated. No acute bony abnormality.       Impression:       1. There appears to be mild bibasilar atelectasis with additional trace  layering pleural effusions. No obvious inflammatory infiltrate.     This report was finalized on 11/20/2019 14:57 by Dr Alexei Vanessa, .    XR Chest 1 View [[id number]] Updated:  11/18/19 0445    XR Chest 1 View [[id number]] Collected:  11/17/19 0932     Updated:  11/17/19 0936    Narrative:       EXAMINATION:  XR CHEST 1 VW-  11/17/2019 3:20 AM CST     HISTORY: on ventilator; J96.01-Acute respiratory failure with hypoxia;  J96.02-Acute respiratory failure with hypercapnia     COMPARISON: 11/15/2019.     FINDINGS:  There is a new NG tube extending to the stomach. The  endotracheal tube tip is at the T4-5 level. There is no dense  infiltrate. There is mild bronchial wall thickening. Heart size is upper  limits of normal.       Impression:       1. No focal infiltrate.  2. Mild bronchial wall thickening, stable.        This report was finalized on 11/17/2019 09:33 by Dr. Elias Pennington MD.    CT Angiogram Chest With & Without Contrast [[id number]] Collected:  11/16/19 1735     Updated:  11/16/19 1742    Narrative:        EXAMINATION:   CT ANGIOGRAM CHEST W WO CONTRAST-  11/16/2019 5:35 PM CST     HISTORY: CT chest angiogram dated 11/16/2019 5:09 PM CST      HISTORY: PE suspected     COMPARISON: None.      DLP: 560 mGy cm     TECHNIQUE: Helical tomographic images of the chest were obtained after  the administration of intravenous contrast following angiogram protocol.  Additionally, 3D MIP reconstructions in the coronal and sagittal planes  were provided.        FINDINGS:    The imaged portion of the base of the neck appears unremarkable.      There is adequate enhancement of the pulmonary arteries to evaluate for  central and segmental pulmonary emboli. There are no filling defects  within the main, lobar, segmental or visualized subsegmental pulmonary  arteries. The pulmonary vessels are within normal limits.      Small bilateral posterior pleural effusions are present. A 7 mm nodule  is present right upper lung on image 44. Left basilar atelectasis is  noted.. The trachea and bronchial tree are patent.      The heart and great vessels appear unremarkable. There is no pericardial  effusion.      No enlarged axillary or mediastinal lymph nodes are present.      The osseous structures of the thorax and surrounding soft tissues  demonstrate no acute process.      Nasogastric tube is present as a feeding tube and is satisfactorily  positioned.        Impression:       1. No evidence of embolic disease.        2. Infiltrate or atelectasis left lower lobe.        3. Small bilateral pleural effusions are noted.  4. 7 mm nodule left upper lobe. 6 month follow-up is suggested.        This report was finalized on 11/16/2019 17:39 by Dr. Kailash Crouch MD.    XR Abdomen KUB [[id number]] Collected:  11/16/19 1137     Updated:  11/16/19 1142    Narrative:       EXAMINATION:  XR ABDOMEN KUB-  11/16/2019 11:06 AM CST     HISTORY: OG tube placement.     COMPARISON: No comparison study.     TECHNIQUE: Supine image of the chest and upper abdomen.       FINDINGS:   A gastric tube extends to the stomach. The side-port and tip  are in the stomach. The side-port is just below the gastroesophageal  junction. The tube could be advanced distally about 5 cm.       Impression:       OG tube placement, as described.        This report was finalized on 11/16/2019 11:39 by Dr. Elias Pennington MD.    XR Chest 1 View [[id number]] Collected:  11/15/19 2005     Updated:  11/15/19 2008    Narrative:       Frontal upright radiograph of the chest 11/15/2019 6:15 PM CST     COMPARISON: 11/15/2019 at 1:58 PM     HISTORY postintubation.     FINDINGS:   The lungs are clear. Cardiac silhouettes mildly enlarged. Endotracheal  tube is present satisfactorily position.      The osseous structures and surrounding soft tissues demonstrate no acute  abnormality.       Impression:       1. Mild cardiomegaly no evidence of pulmonary vascular congestion.        This report was finalized on 11/15/2019 20:05 by Dr. Kailash Crouch MD.    XR Chest 1 View [[id number]] Collected:  11/15/19 1413     Updated:  11/15/19 1417    Narrative:       Frontal upright radiograph of the chest 11/15/2019 2:09 PM CST     COMPARISON: 10/30/2019.     HISTORY: Short of breath.     FINDINGS:   Hyperinflation flattening diaphragms noted consistent with COPD.. The  cardiac silhouettes mildly enlarged but unchanged..      The osseous structures and surrounding soft tissues demonstrate no acute  abnormality.       Impression:       1. COPD no acute cardiopulmonary process.        This report was finalized on 11/15/2019 14:14 by Dr. Kailash Crouch MD.        Lab Results (last 7 days)     Procedure Component Value Units Date/Time    Basic Metabolic Panel [[id number]]  (Abnormal) Collected:  11/22/19 0425    Specimen:  Blood Updated:  11/22/19 0510     Glucose 146 mg/dL      BUN 30 mg/dL      Creatinine 1.27 mg/dL      Sodium 141 mmol/L      Potassium 4.1 mmol/L      Chloride 99 mmol/L      CO2 34.0 mmol/L      Calcium 8.8 mg/dL       eGFR Non African Amer 55 mL/min/1.73      BUN/Creatinine Ratio 23.6     Anion Gap 8.0 mmol/L     Narrative:       GFR Normal >60  Chronic Kidney Disease <60  Kidney Failure <15    CBC (No Diff) [[id number]]  (Abnormal) Collected:  11/22/19 0425    Specimen:  Blood Updated:  11/22/19 0500     WBC 8.48 10*3/mm3      RBC 4.49 10*6/mm3      Hemoglobin 9.1 g/dL      Hematocrit 32.4 %      MCV 72.2 fL      MCH 20.3 pg      MCHC 28.1 g/dL      RDW 21.8 %      RDW-SD 53.2 fl      MPV 10.2 fL      Platelets 407 10*3/mm3     Basic Metabolic Panel [[id number]]  (Abnormal) Collected:  11/21/19 0515    Specimen:  Blood Updated:  11/21/19 0601     Glucose 110 mg/dL      BUN 25 mg/dL      Creatinine 1.15 mg/dL      Sodium 142 mmol/L      Potassium 4.0 mmol/L      Chloride 98 mmol/L      CO2 37.0 mmol/L      Calcium 9.2 mg/dL      eGFR Non African Amer 62 mL/min/1.73      BUN/Creatinine Ratio 21.7     Anion Gap 7.0 mmol/L     Narrative:       GFR Normal >60  Chronic Kidney Disease <60  Kidney Failure <15    Blood Culture - Blood, Arm, Left [[id number]] Collected:  11/15/19 1355    Specimen:  Blood from Arm, Left Updated:  11/20/19 1431     Blood Culture No growth at 5 days    Blood Culture - Blood, Hand, Right [[id number]] Collected:  11/15/19 1359    Specimen:  Blood from Hand, Right Updated:  11/20/19 1431     Blood Culture No growth at 5 days    Basic Metabolic Panel [[id number]]  (Abnormal) Collected:  11/20/19 0613    Specimen:  Blood Updated:  11/20/19 0653     Glucose 88 mg/dL      BUN 21 mg/dL      Creatinine 0.92 mg/dL      Sodium 140 mmol/L      Potassium 4.0 mmol/L      Chloride 100 mmol/L      CO2 36.0 mmol/L      Calcium 9.3 mg/dL      eGFR Non African Amer 80 mL/min/1.73      BUN/Creatinine Ratio 22.8     Anion Gap 4.0 mmol/L     Narrative:       GFR Normal >60  Chronic Kidney Disease <60  Kidney Failure <15    CBC (No Diff) [[id number]]  (Abnormal) Collected:  11/20/19 0613    Specimen:  Blood Updated:  11/20/19 0639      WBC 10.53 10*3/mm3      RBC 4.99 10*6/mm3      Hemoglobin 9.8 g/dL      Hematocrit 36.6 %      MCV 73.3 fL      MCH 19.6 pg      MCHC 26.8 g/dL      RDW 20.6 %      RDW-SD 52.7 fl      MPV 9.8 fL      Platelets 399 10*3/mm3     Vitamin B12 [[id number]]  (Normal) Collected:  11/19/19 0553    Specimen:  Blood Updated:  11/19/19 2013     Vitamin B-12 869 pg/mL     Respiratory Culture - Sputum, Bronchus [[id number]]  (Abnormal)  (Susceptibility) Collected:  11/16/19 1604    Specimen:  Sputum from Bronchus Updated:  11/19/19 0959     Respiratory Culture Light growth (2+) Acinetobacter baumannii      Rare Normal Respiratory Richardson     Gram Stain Greater than 25 WBCs per low power field      Moderate (3+) Epithelial cells per low power field      Moderate (3+) Mixed gram positive richardson      Few (2+) Gram negative bacilli    Susceptibility      Acinetobacter baumannii     ADENIKE     Ampicillin + Sulbactam Susceptible     Cefepime Susceptible     Ceftazidime Susceptible     Gentamicin Susceptible     Piperacillin + Tazobactam Susceptible     Tetracycline Susceptible                    Ferritin [[id number]]  (Abnormal) Collected:  11/17/19 0241    Specimen:  Blood Updated:  11/18/19 0841     Ferritin 5.87 ng/mL     Iron Profile [[id number]]  (Abnormal) Collected:  11/17/19 0241    Specimen:  Blood Updated:  11/18/19 0841     Iron 11 mcg/dL      Iron Saturation 3 %      Transferrin 293 mg/dL      TIBC 437 mcg/dL     CBC Auto Differential [[id number]]  (Abnormal) Collected:  11/18/19 0545    Specimen:  Blood Updated:  11/18/19 0552     WBC 8.35 10*3/mm3      RBC 4.19 10*6/mm3      Hemoglobin 8.4 g/dL      Hematocrit 31.0 %      MCV 74.0 fL      MCH 20.0 pg      MCHC 27.1 g/dL      RDW 20.0 %      RDW-SD 53.1 fl      MPV 9.4 fL      Platelets 333 10*3/mm3      Neutrophil % 91.3 %      Lymphocyte % 4.0 %      Monocyte % 3.0 %      Eosinophil % 0.0 %      Basophil % 0.1 %      Neutrophils, Absolute 7.63 10*3/mm3      Lymphocytes,  Absolute 0.33 10*3/mm3      Monocytes, Absolute 0.25 10*3/mm3      Eosinophils, Absolute 0.00 10*3/mm3      Basophils, Absolute 0.01 10*3/mm3     Basic Metabolic Panel [[id number]]  (Abnormal) Collected:  11/17/19 0241    Specimen:  Blood Updated:  11/17/19 0307     Glucose 199 mg/dL      BUN 19 mg/dL      Creatinine 0.99 mg/dL      Sodium 141 mmol/L      Potassium 4.4 mmol/L      Chloride 105 mmol/L      CO2 26.0 mmol/L      Calcium 8.4 mg/dL      eGFR Non African Amer 73 mL/min/1.73      BUN/Creatinine Ratio 19.2     Anion Gap 10.0 mmol/L     Narrative:       GFR Normal >60  Chronic Kidney Disease <60  Kidney Failure <15    CBC Auto Differential [[id number]]  (Abnormal) Collected:  11/17/19 0241    Specimen:  Blood Updated:  11/17/19 0303     WBC 5.22 10*3/mm3      RBC 3.83 10*6/mm3      Hemoglobin 7.5 g/dL      Hematocrit 27.6 %      MCV 72.1 fL      MCH 19.6 pg      MCHC 27.2 g/dL      RDW 20.3 %      RDW-SD 51.8 fl      MPV 10.1 fL      Platelets 305 10*3/mm3      Neutrophil % 86.8 %      Lymphocyte % 8.6 %      Monocyte % 3.6 %      Eosinophil % 0.0 %      Basophil % 0.0 %      Immature Grans % 1.0 %      Neutrophils, Absolute 4.53 10*3/mm3      Lymphocytes, Absolute 0.45 10*3/mm3      Monocytes, Absolute 0.19 10*3/mm3      Eosinophils, Absolute 0.00 10*3/mm3      Basophils, Absolute 0.00 10*3/mm3      Immature Grans, Absolute 0.05 10*3/mm3      nRBC 2.7 /100 WBC     Blood Gas, Arterial With Co-Ox [[id number]]  (Abnormal) Collected:  11/17/19 0257    Specimen:  Arterial Blood Updated:  11/17/19 0302     Site Right Brachial     Dov's Test N/A     pH, Arterial 7.381 pH units      pCO2, Arterial 49.0 mm Hg      Comment: 83 Value above reference range        pO2, Arterial 75.4 mm Hg      Comment: 84 Value below reference range        HCO3, Arterial 29.0 mmol/L      Comment: 83 Value above reference range        Base Excess, Arterial 3.5 mmol/L      Comment: 83 Value above reference range        O2 Saturation,  Arterial 93.9 %      Comment: 84 Value below reference range        Hemoglobin, Blood Gas 7.5 g/dL      Comment: 84 Value below reference range        Hematocrit, Blood Gas 23.0 %      Comment: 84 Value below reference range        Oxyhemoglobin 91.7 %      Comment: 84 Value below reference range        Methemoglobin 1.30 %      Carboxyhemoglobin 1.0 %      A-a Gradiant 153.4 mmHg      Temperature 37.0 C      Sodium, Arterial 143 mmol/L      Potassium, Arterial 4.2 mmol/L      Barometric Pressure for Blood Gas 754 mmHg      Modality Ventilator     FIO2 40 %      Ventilator Mode AC     Set Tidal Volume 600     Set Mech Resp Rate 10.0     PEEP 5.0     Note --     Collected by [id number]     Comment: Meter: [id number]     :  [id number]        pH, Temp Corrected --     pCO2, Temperature Corrected --     pO2, Temperature Corrected --    Urine Drug Screen - Urine, Clean Catch [[id number]]  (Normal) Collected:  11/16/19 1101    Specimen:  Urine, Clean Catch Updated:  11/16/19 1123     THC, Screen, Urine Negative     Phencyclidine (PCP), Urine Negative     Cocaine Screen, Urine Negative     Methamphetamine, Ur Negative     Opiate Screen Negative     Amphetamine Screen, Urine Negative     Benzodiazepine Screen, Urine Negative     Tricyclic Antidepressants Screen Negative     Methadone Screen, Urine Negative     Barbiturates Screen, Urine Negative     Oxycodone Screen, Urine Negative     Propoxyphene Screen Negative     Buprenorphine, Screen, Urine Negative    Narrative:       Cutoff For Drugs Screened:    Amphetamines               500 ng/ml  Barbiturates               200 ng/ml  Benzodiazepines            150 ng/ml  Cocaine                    150 ng/ml  Methadone                  200 ng/ml  Opiates                    100 ng/ml  Phencyclidine               25 ng/ml  THC                            50 ng/ml  Methamphetamine            500 ng/ml  Tricyclic Antidepressants  300 ng/ml  Oxycodone                  100  ng/ml  Propoxyphene               300 ng/ml  Buprenorphine               10 ng/ml    The normal value for all drugs tested is negative. This report includes unconfirmed screening results, with the cutoff values listed, to be used for medical treatment purposes only.  Unconfirmed results must not be used for non-medical purposes such as employment or legal testing.  Clinical consideration should be applied to any drug of abuse test, particularly when unconfirmed results are used.      Comprehensive Metabolic Panel [[id number]]  (Abnormal) Collected:  11/16/19 0704    Specimen:  Blood Updated:  11/16/19 0737     Glucose 171 mg/dL      BUN 16 mg/dL      Creatinine 1.03 mg/dL      Sodium 142 mmol/L      Potassium 4.3 mmol/L      Chloride 104 mmol/L      CO2 30.0 mmol/L      Calcium 8.5 mg/dL      Total Protein 6.8 g/dL      Albumin 3.10 g/dL      ALT (SGPT) 13 U/L      AST (SGOT) 23 U/L      Alkaline Phosphatase 119 U/L      Total Bilirubin 0.6 mg/dL      eGFR Non African Amer 70 mL/min/1.73      Globulin 3.7 gm/dL      A/G Ratio 0.8 g/dL      BUN/Creatinine Ratio 15.5     Anion Gap 8.0 mmol/L     Narrative:       GFR Normal >60  Chronic Kidney Disease <60  Kidney Failure <15    Lipid Panel [[id number]]  (Abnormal) Collected:  11/16/19 0704    Specimen:  Blood Updated:  11/16/19 0737     Total Cholesterol 103 mg/dL      Triglycerides 145 mg/dL      HDL Cholesterol 25 mg/dL      LDL Cholesterol  49 mg/dL      VLDL Cholesterol 29 mg/dL      LDL/HDL Ratio 1.96    Narrative:       Cholesterol Reference Ranges  (U.S. Department of Health and Human Services ATP III Classifications)    Desirable          <200 mg/dL  Borderline High    200-239 mg/dL  High Risk          >240 mg/dL      Triglyceride Reference Ranges  (U.S. Department of Health and Human Services ATP III Classifications)    Normal           <150 mg/dL  Borderline High  150-199 mg/dL  High             200-499 mg/dL  Very High        >500 mg/dL    HDL Reference  Ranges  (U.S. Department of Health and Human Services ATP III Classifcations)    Low     <40 mg/dl (major risk factor for CHD)  High    >60 mg/dl ('negative' risk factor for CHD)        LDL Reference Ranges  (U.S. Department of Health and Human Services ATP III Classifcations)    Optimal          <100 mg/dL  Near Optimal     100-129 mg/dL  Borderline High  130-159 mg/dL  High             160-189 mg/dL  Very High        >189 mg/dL    TSH [[id number]]  (Normal) Collected:  11/16/19 0704    Specimen:  Blood Updated:  11/16/19 0737     TSH 0.407 uIU/mL     CBC Auto Differential [[id number]]  (Abnormal) Collected:  11/16/19 0704    Specimen:  Blood Updated:  11/16/19 0729     WBC 4.97 10*3/mm3      RBC 4.17 10*6/mm3      Hemoglobin 8.1 g/dL      Hematocrit 29.9 %      MCV 71.7 fL      MCH 19.4 pg      MCHC 27.1 g/dL      RDW 20.2 %      RDW-SD 51.7 fl      MPV 9.9 fL      Platelets 376 10*3/mm3      Neutrophil % 79.3 %      Lymphocyte % 17.3 %      Monocyte % 2.4 %      Eosinophil % 0.0 %      Basophil % 0.2 %      Neutrophils, Absolute 3.94 10*3/mm3      Lymphocytes, Absolute 0.86 10*3/mm3      Monocytes, Absolute 0.12 10*3/mm3      Eosinophils, Absolute 0.00 10*3/mm3      Basophils, Absolute 0.01 10*3/mm3     Hemoglobin A1c [[id number]]  (Abnormal) Collected:  11/16/19 0704    Specimen:  Blood Updated:  11/16/19 0726     Hemoglobin A1C 6.00 %     Narrative:       Hemoglobin A1C Ranges:    Increased Risk for Diabetes  5.7% to 6.4%  Diabetes                     >= 6.5%  Diabetic Goal                < 7.0%    Blood Gas, Arterial [[id number]]  (Abnormal) Collected:  11/16/19 0349    Specimen:  Arterial Blood Updated:  11/16/19 0358     Site Right Radial     Dov's Test Positive     pH, Arterial 7.489 pH units      Comment: 83 Value above reference range        pCO2, Arterial 41.1 mm Hg      pO2, Arterial 56.8 mm Hg      Comment: 84 Value below reference range        HCO3, Arterial 31.2 mmol/L      Comment: 83 Value above  reference range        Base Excess, Arterial 7.2 mmol/L      Comment: 83 Value above reference range        O2 Saturation, Arterial 90.9 %      Comment: 84 Value below reference range        Temperature 37.0 C      Barometric Pressure for Blood Gas 758 mmHg      Modality Ventilator     FIO2 40 %      Ventilator Mode AC     Set Tidal Volume 600     Set Mech Resp Rate 18.0     PEEP 5.0     Collected by [id number]     Comment: Meter: [id number]     :  [id number]       Blood Gas, Arterial [[id number]]  (Abnormal) Collected:  11/15/19 2043    Specimen:  Arterial Blood Updated:  11/15/19 2053     Site Right Brachial     Dov's Test N/A     pH, Arterial 7.291 pH units      Comment: 84 Value below reference range        pCO2, Arterial 66.8 mm Hg      Comment: 83 Value above reference range        pO2, Arterial 121.0 mm Hg      Comment: 83 Value above reference range        HCO3, Arterial 32.2 mmol/L      Comment: 83 Value above reference range        Base Excess, Arterial 4.7 mmol/L      Comment: 83 Value above reference range        O2 Saturation, Arterial 98.5 %      Temperature 37.0 C      Barometric Pressure for Blood Gas 759 mmHg      Modality Ventilator     FIO2 60 %      Ventilator Mode AC     Set Tidal Volume 600     Set Mech Resp Rate 14.0     PEEP 5.0     Collected by [id number]     Comment: Meter: [id number]     :  [id number]       Blood Gas, Arterial [[id number]]  (Abnormal) Collected:  11/15/19 1630    Specimen:  Arterial Blood Updated:  11/15/19 1640     Site Right Radial     Dov's Test Positive     pH, Arterial 7.309 pH units      Comment: 84 Value below reference range        pCO2, Arterial 66.1 mm Hg      Comment: 83 Value above reference range        pO2, Arterial 76.8 mm Hg      Comment: 84 Value below reference range        HCO3, Arterial 33.2 mmol/L      Comment: 83 Value above reference range        Base Excess, Arterial 5.9 mmol/L      Comment: 83 Value above reference range        O2  Saturation, Arterial 94.4 %      Temperature 37.0 C      Barometric Pressure for Blood Gas 759 mmHg      Modality BiPap     FIO2 30 %      Ventilator Mode NA     Set Mech Resp Rate 16.0     IPAP 20     Comment: Meter: [id number]     :  [id number]        EPAP 6     Collected by [id number]    Blood Gas, Arterial [[id number]]  (Abnormal) Collected:  11/15/19 1514    Specimen:  Arterial Blood Updated:  11/15/19 1518     Site Left Radial     Dov's Test Positive     pH, Arterial 7.321 pH units      Comment: 84 Value below reference range        pCO2, Arterial 64.5 mm Hg      Comment: 83 Value above reference range        pO2, Arterial 74.0 mm Hg      Comment: 84 Value below reference range        HCO3, Arterial 33.3 mmol/L      Comment: 83 Value above reference range        Base Excess, Arterial 6.2 mmol/L      Comment: 83 Value above reference range        O2 Saturation, Arterial 93.9 %      Comment: 84 Value below reference range        Temperature 37.0 C      Barometric Pressure for Blood Gas 759 mmHg      Modality Heated HFNC     FIO2 35 %      Flow Rate 40.0 lpm      Ventilator Mode NA     Collected by [id number]     Comment: Meter: [id number]     :  [id number]       D-dimer, Quantitative [[id number]]  (Abnormal) Collected:  11/15/19 1355    Specimen:  Blood Updated:  11/15/19 1501     D-Dimer, Quantitative 1.81 mg/L (FEU)     Narrative:       Reference Range is 0-0.50 mg/L FEU. However, results <0.50 mg/L FEU tends to rule out DVT or PE. Results >0.50 mg/L FEU are not useful in predicting absence or presence of DVT or PE.    Manual Differential [[id number]]  (Abnormal) Collected:  11/15/19 1355    Specimen:  Blood Updated:  11/15/19 1443     Neutrophil % 72.7 %      Lymphocyte % 8.1 %      Monocyte % 4.0 %      Eosinophil % 12.1 %      Basophil % 2.0 %      Atypical Lymphocyte % 1.0 %      Neutrophils Absolute 4.78 10*3/mm3      Lymphocytes Absolute 0.53 10*3/mm3      Monocytes Absolute 0.26 10*3/mm3       Eosinophils Absolute 0.79 10*3/mm3      Basophils Absolute 0.13 10*3/mm3      nRBC 4.0 /100 WBC      Anisocytosis Slight/1+     Hypochromia Mod/2+     Microcytes Slight/1+     Poikilocytes Slight/1+     Polychromasia Slight/1+     WBC Morphology Normal     Giant Platelets Large/3+    CBC Auto Differential [[id number]]  (Abnormal) Collected:  11/15/19 1355    Specimen:  Blood Updated:  11/15/19 1443     WBC 6.57 10*3/mm3      RBC 4.34 10*6/mm3      Hemoglobin 8.5 g/dL      Hematocrit 32.4 %      MCV 74.7 fL      MCH 19.6 pg      MCHC 26.2 g/dL      RDW 20.5 %      RDW-SD 54.5 fl      MPV 9.5 fL      Platelets 372 10*3/mm3     Comprehensive Metabolic Panel [[id number]]  (Abnormal) Collected:  11/15/19 1355    Specimen:  Blood Updated:  11/15/19 1439     Glucose 128 mg/dL      BUN 14 mg/dL      Creatinine 1.06 mg/dL      Sodium 144 mmol/L      Potassium 3.8 mmol/L      Chloride 103 mmol/L      CO2 33.0 mmol/L      Calcium 9.0 mg/dL      Total Protein 7.6 g/dL      Albumin 3.70 g/dL      ALT (SGPT) 17 U/L      AST (SGOT) 27 U/L      Alkaline Phosphatase 145 U/L      Total Bilirubin 0.6 mg/dL      eGFR Non African Amer 68 mL/min/1.73      Globulin 3.9 gm/dL      A/G Ratio 0.9 g/dL      BUN/Creatinine Ratio 13.2     Anion Gap 8.0 mmol/L     Narrative:       GFR Normal >60  Chronic Kidney Disease <60  Kidney Failure <15    BNP [[id number]]  (Normal) Collected:  11/15/19 1355    Specimen:  Blood Updated:  11/15/19 1435     proBNP 1,550.0 pg/mL     Narrative:       Among patients with dyspnea, NT-proBNP is highly sensitive for the detection of acute congestive heart failure. In addition NT-proBNP of <300 pg/ml effectively rules out acute congestive heart failure with 99% negative predictive value.    Troponin [[id number]]  (Normal) Collected:  11/15/19 1355    Specimen:  Blood Updated:  11/15/19 1435     Troponin T 0.012 ng/mL     Narrative:       Troponin T Reference Range:  <= 0.03 ng/mL-   Negative for AMI  >0.03 ng/mL-      Abnormal for myocardial necrosis.  Clinicians would have to utilize clinical acumen, EKG, Troponin and serial changes to determine if it is an Acute Myocardial Infarction or myocardial injury due to an underlying chronic condition.     Magnesium [[id number]]  (Normal) Collected:  11/15/19 1355    Specimen:  Blood Updated:  11/15/19 1435     Magnesium 2.0 mg/dL     Lactic Acid, Plasma [[id number]]  (Normal) Collected:  11/15/19 1355    Specimen:  Blood Updated:  11/15/19 1431     Lactate 1.1 mmol/L     Blood Gas, Arterial [[id number]]  (Abnormal) Collected:  11/15/19 1356    Specimen:  Arterial Blood Updated:  11/15/19 1401     Site Left Radial     Dov's Test Positive     pH, Arterial 7.355 pH units      pCO2, Arterial 57.5 mm Hg      Comment: 83 Value above reference range        pO2, Arterial 45.9 mm Hg      Comment: 85 Value below critical limit        HCO3, Arterial 32.1 mmol/L      Comment: 83 Value above reference range        Base Excess, Arterial 5.7 mmol/L      Comment: 83 Value above reference range        O2 Saturation, Arterial 80.0 %      Comment: 84 Value below reference range        Temperature 37.0 C      Barometric Pressure for Blood Gas 759 mmHg      Modality Room Air     Ventilator Mode NA     Notified Who DR YOU     Notified By [id number]     Notified Time 11/15/2019 14:01     Collected by [id number]     Comment: Meter: [id number]     :  [id number]           Hospital Course:  The patient was originally admitted on 11/15 by Charmaine Yao and Dr. Lamb.  He presented to the emergency department with shortness of breath. He was felt to be having an exacerbation of chronic obstructive pulmonary disease secondary to long-standing and ongoing tobacco abuse.  His carbon dioxide retention and pH continued to worsen.  Trials of BiPAP were unsuccessful at controlling this.  He ultimately required intubation mechanical ventilation.  He was extubated on 11/17.     He was followed by the pulmonary  service.  He will require 2 L nasal cannula at discharge.     He has finished Rocephin.  He has grown pan susceptible Acinetobacter on sputum culture.  He does not have a clear pneumonia.  This is likely colonization. Mucinex and incentive spirometry. Continue inhaled bronchodilators. Started Symbicort on 11/18. Weaned Solu-Medrol to oral prednisone on 11/19.     Counseled for tobacco cessation.  Nicotine patch offered.     He will need a repeat noncontrasted CT scan of the chest in 6 months to reevaluate his left upper lobe pulmonary nodule.     PA lateral chest x-ray on 11/20 showed mild bibasilar atelectasis with additional trace layering pleural effusions with no obvious inflammatory infiltrate.     Started lisinopril on 11/19 and have uptitrated.   Added metoprolol tartrate on 11/20.  He has some diastolic dysfunction on echocardiogram.  His BNP was normal at presentation, but he has had some complaints of scrotal edema and now has worsening lower extremity edema. Lasix IV BID was given for a few days with improvements.  He will be discharged on 40 mg of oral Lasix on a daily basis.  He has been encouraged to restrict his fluid intake somewhat and elevate his legs when not active.     He was hyperglycemic secondary to steroids. Hemoglobin A1c 6.0.      The patient appears to have a chronic microcytic anemia.  He has been transfused 1 unit packed red blood cells on 11/17.  Anemia substrates were not assessed prior to the administration of blood products, but I was able to add them to old blood.  He is significantly iron deficient.  IV Venofer.  Dr. Caldera had him set up to have an outpatient colonoscopy on 11/18, however, he was obviously admitted.  This will need to be rescheduled as an outpatient for further evaluation of his chronic microcytic, iron deficient anemia.     B12 normal.     Bowel regimen in place.      Lovenox was used for DVT prophylaxis.     Pepcid was used for peptic ulcer prophylaxis.     He  "has been accepted to a skilled nursing facility and per year, [place] today. It is called [facility].  This is close to his home.  This is also close to his sister and his daughter.  His ultimate plan is to go there for strengthening and continued medical tuning.  He ultimately plans to return home with home health and assistance from friends and family.    Physical Exam on Discharge:  /64 (BP Location: Right arm, Patient Position: Sitting)   Pulse 77   Temp 98 °F (36.7 °C) (Oral)   Resp 16   Ht 162.6 cm (64.02\")   Wt 87.9 kg (193 lb 12.8 oz)   SpO2 96%   BMI 33.25 kg/m²    Physical Exam  Constitutional: He is oriented to person, place, and time. He appears well-developed and well-nourished. Up on the side of the bed.  No distress.  No family present. Discussed with his nurse, Lauren.    Head: Normocephalic and atraumatic.   Eyes: Conjunctivae and EOM are normal. Pupils are equal, round, and reactive to light.   Neck: Neck supple. No JVD present.   Cardiovascular: Normal rate, regular rhythm, normal heart sounds and intact distal pulses. Exam reveals no gallop and no friction rub.   No murmur heard.  Pulmonary/Chest: Effort normal. No respiratory distress. He has wheezes (slight). He has no rales. He exhibits no tenderness. 2L.   Abdominal: Soft. Bowel sounds are normal. He exhibits distension. There is no tenderness. There is no rebound and no guarding.   Musculoskeletal: Normal range of motion. He exhibits no edema, tenderness or deformity. Trace to 1+ LE edema ( improved from yesterday) and with improving scrotal edema.    Neurological: He is alert and oriented to person, place, and time. He displays normal reflexes. No cranial nerve deficit. He exhibits normal muscle tone.   Skin: Skin is warm and dry. No rash noted.   Psychiatric: He has a normal mood and affect. His behavior is normal. Judgment and thought content normal.     Condition on Discharge: Good.     Discharge Disposition:  Skilled " Nursing Facility ([place] - External): [facility] access hospital in [place], [place].     Discharge Medications:     Discharge Medications      New Medications      Instructions Start Date   aluminum-magnesium hydroxide-simethicone 400-400-40 MG/5ML suspension  Commonly known as:  MAALOX MAX   15 mL, Oral, Every 6 Hours PRN      bisacodyl 5 MG EC tablet  Commonly known as:  DULCOLAX   10 mg, Oral, Daily PRN      budesonide-formoterol 160-4.5 MCG/ACT inhaler  Commonly known as:  SYMBICORT   2 puffs, Inhalation, 2 Times Daily - RT      ferrous sulfate 325 (65 FE) MG tablet  Commonly known as:  FERROUSUL   325 mg, Oral, Daily With Breakfast      furosemide 40 MG tablet  Commonly known as:  LASIX   40 mg, Oral, Daily      guaiFENesin 600 MG 12 hr tablet  Commonly known as:  MUCINEX   1,200 mg, Oral, Every 12 Hours Scheduled      HYDROcodone-acetaminophen 5-325 MG per tablet  Commonly known as:  NORCO   1 tablet, Oral, Every 6 Hours PRN      hydrocortisone 25 MG suppository  Commonly known as:  ANUSOL-HC   25 mg, Rectal, 2 Times Daily      hydrOXYzine 50 MG tablet  Commonly known as:  ATARAX   50 mg, Oral, Nightly PRN      ipratropium-albuterol 0.5-2.5 mg/3 ml nebulizer  Commonly known as:  DUO-NEB   3 mL, Nebulization, Every 6 Hours - RT      lisinopril 10 MG tablet  Commonly known as:  PRINIVIL,ZESTRIL   10 mg, Oral, Every 24 Hours Scheduled   Start Date:  11/23/2019     metoprolol tartrate 25 MG tablet  Commonly known as:  LOPRESSOR   25 mg, Oral, Every 12 Hours Scheduled      nicotine 21 MG/24HR patch  Commonly known as:  NICODERM CQ   1 patch, Transdermal, Nightly      ondansetron 4 MG tablet  Commonly known as:  ZOFRAN   4 mg, Oral, Every 6 Hours PRN      polyethylene glycol pack packet  Commonly known as:  MIRALAX   17 g, Oral, Daily   Start Date:  11/23/2019     predniSONE 10 MG tablet  Commonly known as:  DELTASONE   Take 4 tablets daily for 3 days, then take 2 tablets daily for 3 days, then take 1 tablet daily for 3 days, then stop.            Discharge Diet:   Diet Instructions     Diet: Cardiac; Thin      Discharge Diet:  Cardiac    Fluid Consistency:  Thin        Activity at Discharge:   Activity Instructions     Activity as Tolerated          Follow-up Appointments:   1. PCP or SNF physician in 1 week.   2. Dr. Ramey per him.   3. PCP to reschedule his missed colonoscopy when medically stable.  4.  PCP or Dr. Ramey to get a noncontrast CT in 6 months to reevaluate pulmonary nodularity.    Test Results Pending at Discharge: None.     Delvin Lopez DO  11/22/19  11:46 AM    Time: 40 minutes.           Electronically signed by Delvin Lopez DO at 11/22/19 6390

## 2019-11-22 NOTE — DISCHARGE PLACEMENT REQUEST
"[facility] [phone]  Yuli Christian (79 y.o. Male)     Date of Birth Social Security Number Address Home Phone MRN    1940  [id number] [place] [id number] [phone] [id number]    [employer] Marital Status          [place] Single       Admission Date Admission Type Admitting Provider Attending Provider Department, Room/Bed    11/15/19 Emergency Delvin Lopez DO Hancock, John C, DO [address], 473/1    Discharge Date Discharge Disposition Discharge Destination         Skilled Nursing Facility ([place] - External)              Attending Provider:  Delvin Lopez DO    Allergies:  Tetracycline    Isolation:  None   Infection:  None   Code Status:  CPR    Ht:  162.6 cm (64.02\")   Wt:  87.9 kg (193 lb 12.8 oz)    Admission Cmt:  None   Principal Problem:  Acute respiratory failure with hypoxia and hypercapnia (CMS/HCC) [J96.01,J96.02]                 Active Insurance as of 11/15/2019     Primary Coverage     Payor Plan Insurance Group Employer/Plan Group    MEDICARE MEDICARE A & B      Payor Plan Address Payor Plan Phone Number Payor Plan Fax Number Effective Dates    [address] [phone]  11/1/2007 - None Entered    [place] [address]       Subscriber Name Subscriber Birth Date Member ID       YULI CHRISTIAN 1940 [id number]                 Emergency Contacts      (Rel.) Home Phone Work Phone Mobile Phone    Jose Alejandro Leonardo (Daughter) [phone] -- --    HARDY FRANCOIS (Friend) [phone] -- --    LUIS FRANCOIS (Friend) [phone] -- --              "

## 2019-11-22 NOTE — DISCHARGE PLACEMENT REQUEST
"  Antonieta Whitfield [phone]  Yuli Christian (79 y.o. Male)     Date of Birth Social Security Number Address Home Phone MRN    1940  [id number] [place] [id number] [phone] [id number]    Congregation Marital Status          [facility] Single       Admission Date Admission Type Admitting Provider Attending Provider Department, Room/Bed    11/15/19 Emergency Delvin Lopez DO Hancock, John C, DO [address], 473/1    Discharge Date Discharge Disposition Discharge Destination         Skilled Nursing Facility (DC - External)              Attending Provider:  Delvin Lopez DO    Allergies:  Tetracycline    Isolation:  None   Infection:  None   Code Status:  CPR    Ht:  162.6 cm (64.02\")   Wt:  87.9 kg (193 lb 12.8 oz)    Admission Cmt:  None   Principal Problem:  Acute respiratory failure with hypoxia and hypercapnia (CMS/HCC) [J96.01,J96.02]                 Active Insurance as of 11/15/2019     Primary Coverage     Payor Plan Insurance Group Employer/Plan Group    MEDICARE MEDICARE A & B      Payor Plan Address Payor Plan Phone Number Payor Plan Fax Number Effective Dates    [address] [phone]  11/1/2007 - None Entered    [place] [id number]       Subscriber Name Subscriber Birth Date Member ID       YULI CHRISTIAN 1940 [id number]                 Emergency Contacts      (Rel.) Home Phone Work Phone Mobile Phone    Jose Alejandro Leonardo (Daughter) [phone] -- --    HARDY FRANCOIS (Friend) [phone] -- --    LUIS FRANCOIS (Friend) [phone] -- --               Discharge Summary      Delvin Lopez DO at 11/22/19 [facility]                [facility]  DISCHARGE SUMMARY       Date of Admission: 11/15/2019  Date of Discharge:  11/22/2019  Primary Care Physician: Delvin Caldera MD    Presenting Problem/History of Present Illness:  Shortness of breath.     Final Discharge Diagnoses:  Active Hospital " Problems    Diagnosis   • **Acute respiratory failure with hypoxia and hypercapnia (CMS/HCC)   • COPD with acute exacerbation (CMS/HCC)   • Pulmonary nodule   • Acute diastolic heart failure (CMS/HCC)   • Microcytic anemia   • Hyperglycemia   • Alcoholism /alcohol abuse (CMS/HCC)   • Chronic constipation   • Essential hypertension     Consults: Dr. Ramey with pulmonary.     Procedures Performed:   Results for orders placed during the hospital encounter of 11/15/19   Adult Transthoracic Echo Complete With Contrast if Necessary Per Protocol    Narrative · Left ventricular systolic function is normal. Estimated EF appears to be   in the range of 61 - 65%.  · Left ventricular diastolic dysfunction (grade I) consistent with   impaired relaxation.  · Left ventricular wall thickness is consistent with mild concentric   hypertrophy.  · No hemodynamically significant valvular abnormalities identified on the   study.        Pertinent Test Results:   Imaging Results (Last 7 Days)     Procedure Component Value Units Date/Time    US Venous Doppler Lower Extremity Bilateral (duplex) [[id number]] Collected:  11/21/19 1536     Updated:  11/21/19 1539    Narrative:       History: Swelling       Impression:       Impression: There is no evidence of deep venous thrombosis or  superficial thrombophlebitis of right or left lower extremities.     Comments: Bilateral lower extremity venous duplex exam was performed  using color Doppler flow, Doppler waveform analysis, and grayscale  imaging, with and without compression. There is no evidence of deep  venous thrombosis in the common femoral, superficial femoral, popliteal,  peroneal, anterior tibial, and posterior tibial veins bilaterally. No  thrombus is identified in the saphenofemoral junctions and greater  saphenous veins bilaterally.         This report was finalized on 11/21/2019 15:36 by Dr. John Keane MD.    XR Chest 2 View [[id number]] Collected:  11/20/19 6995     Updated:   11/20/19 1500    Narrative:       XR CHEST 2 VW- 11/20/2019 1:46 PM CST     HISTORY: worsening SOA, increased cough, abnormal sputum culture;  J96.01-Acute respiratory failure with hypoxia; J96.02-Acute respiratory  failure with hypercapnia; R26.9-Unspecified abnormalities of gait and  mobility       COMPARISON: Chest exam dated 11/17/2019.     FINDINGS:   Upright frontal and lateral radiographs of the chest were obtained.     Status post extubation. Minimal patchy densities overlying the  hemidiaphragms, thought to reflect a basilar atelectasis. There appear  to be bilateral trace layering effusions as well. Hyperexpanded lung  volumes with flattening of the hemidiaphragms, suggesting underlying  emphysema. The heart size is normal. The pulmonary vasculature are  nondilated. No acute bony abnormality.       Impression:       1. There appears to be mild bibasilar atelectasis with additional trace  layering pleural effusions. No obvious inflammatory infiltrate.     This report was finalized on 11/20/2019 14:57 by Dr Alexei Vanessa, .    XR Chest 1 View [[id number]] Updated:  11/18/19 0445    XR Chest 1 View [[id number]] Collected:  11/17/19 0932     Updated:  11/17/19 0936    Narrative:       EXAMINATION:  XR CHEST 1 VW-  11/17/2019 3:20 AM CST     HISTORY: on ventilator; J96.01-Acute respiratory failure with hypoxia;  J96.02-Acute respiratory failure with hypercapnia     COMPARISON: 11/15/2019.     FINDINGS:  There is a new NG tube extending to the stomach. The  endotracheal tube tip is at the T4-5 level. There is no dense  infiltrate. There is mild bronchial wall thickening. Heart size is upper  limits of normal.       Impression:       1. No focal infiltrate.  2. Mild bronchial wall thickening, stable.        This report was finalized on 11/17/2019 09:33 by Dr. Elias Pennington MD.    CT Angiogram Chest With & Without Contrast [[id number]] Collected:  11/16/19 1735     Updated:  11/16/19 1742    Narrative:        EXAMINATION:   CT ANGIOGRAM CHEST W WO CONTRAST-  11/16/2019 5:35 PM CST     HISTORY: CT chest angiogram dated 11/16/2019 5:09 PM CST      HISTORY: PE suspected     COMPARISON: None.      DLP: 560 mGy cm     TECHNIQUE: Helical tomographic images of the chest were obtained after  the administration of intravenous contrast following angiogram protocol.  Additionally, 3D MIP reconstructions in the coronal and sagittal planes  were provided.        FINDINGS:    The imaged portion of the base of the neck appears unremarkable.      There is adequate enhancement of the pulmonary arteries to evaluate for  central and segmental pulmonary emboli. There are no filling defects  within the main, lobar, segmental or visualized subsegmental pulmonary  arteries. The pulmonary vessels are within normal limits.      Small bilateral posterior pleural effusions are present. A 7 mm nodule  is present right upper lung on image 44. Left basilar atelectasis is  noted.. The trachea and bronchial tree are patent.      The heart and great vessels appear unremarkable. There is no pericardial  effusion.      No enlarged axillary or mediastinal lymph nodes are present.      The osseous structures of the thorax and surrounding soft tissues  demonstrate no acute process.      Nasogastric tube is present as a feeding tube and is satisfactorily  positioned.        Impression:       1. No evidence of embolic disease.        2. Infiltrate or atelectasis left lower lobe.        3. Small bilateral pleural effusions are noted.  4. 7 mm nodule left upper lobe. 6 month follow-up is suggested.        This report was finalized on 11/16/2019 17:39 by Dr. Kailash Crouch MD.    XR Abdomen KUB [[id number]] Collected:  11/16/19 1137     Updated:  11/16/19 1142    Narrative:       EXAMINATION:  XR ABDOMEN KUB-  11/16/2019 11:06 AM CST     HISTORY: OG tube placement.     COMPARISON: No comparison study.     TECHNIQUE: Supine image of the chest and upper abdomen.       FINDINGS:   A gastric tube extends to the stomach. The side-port and tip  are in the stomach. The side-port is just below the gastroesophageal  junction. The tube could be advanced distally about 5 cm.       Impression:       OG tube placement, as described.        This report was finalized on 11/16/2019 11:39 by Dr. Elias Pennington MD.    XR Chest 1 View [[id number]] Collected:  11/15/19 2005     Updated:  11/15/19 2008    Narrative:       Frontal upright radiograph of the chest 11/15/2019 6:15 PM CST     COMPARISON: 11/15/2019 at 1:58 PM     HISTORY postintubation.     FINDINGS:   The lungs are clear. Cardiac silhouettes mildly enlarged. Endotracheal  tube is present satisfactorily position.      The osseous structures and surrounding soft tissues demonstrate no acute  abnormality.       Impression:       1. Mild cardiomegaly no evidence of pulmonary vascular congestion.        This report was finalized on 11/15/2019 20:05 by Dr. Kailash Crouch MD.    XR Chest 1 View [[id number]] Collected:  11/15/19 1413     Updated:  11/15/19 1417    Narrative:       Frontal upright radiograph of the chest 11/15/2019 2:09 PM CST     COMPARISON: 10/30/2019.     HISTORY: Short of breath.     FINDINGS:   Hyperinflation flattening diaphragms noted consistent with COPD.. The  cardiac silhouettes mildly enlarged but unchanged..      The osseous structures and surrounding soft tissues demonstrate no acute  abnormality.       Impression:       1. COPD no acute cardiopulmonary process.        This report was finalized on 11/15/2019 14:14 by Dr. Kailash Crouch MD.        Lab Results (last 7 days)     Procedure Component Value Units Date/Time    Basic Metabolic Panel [[id number]]  (Abnormal) Collected:  11/22/19 0425    Specimen:  Blood Updated:  11/22/19 0510     Glucose 146 mg/dL      BUN 30 mg/dL      Creatinine 1.27 mg/dL      Sodium 141 mmol/L      Potassium 4.1 mmol/L      Chloride 99 mmol/L      CO2 34.0 mmol/L      Calcium 8.8 mg/dL       eGFR Non African Amer 55 mL/min/1.73      BUN/Creatinine Ratio 23.6     Anion Gap 8.0 mmol/L     Narrative:       GFR Normal >60  Chronic Kidney Disease <60  Kidney Failure <15    CBC (No Diff) [[id number]]  (Abnormal) Collected:  11/22/19 0425    Specimen:  Blood Updated:  11/22/19 0500     WBC 8.48 10*3/mm3      RBC 4.49 10*6/mm3      Hemoglobin 9.1 g/dL      Hematocrit 32.4 %      MCV 72.2 fL      MCH 20.3 pg      MCHC 28.1 g/dL      RDW 21.8 %      RDW-SD 53.2 fl      MPV 10.2 fL      Platelets 407 10*3/mm3     Basic Metabolic Panel [[id number]]  (Abnormal) Collected:  11/21/19 0515    Specimen:  Blood Updated:  11/21/19 0601     Glucose 110 mg/dL      BUN 25 mg/dL      Creatinine 1.15 mg/dL      Sodium 142 mmol/L      Potassium 4.0 mmol/L      Chloride 98 mmol/L      CO2 37.0 mmol/L      Calcium 9.2 mg/dL      eGFR Non African Amer 62 mL/min/1.73      BUN/Creatinine Ratio 21.7     Anion Gap 7.0 mmol/L     Narrative:       GFR Normal >60  Chronic Kidney Disease <60  Kidney Failure <15    Blood Culture - Blood, Arm, Left [[id number]] Collected:  11/15/19 1355    Specimen:  Blood from Arm, Left Updated:  11/20/19 1431     Blood Culture No growth at 5 days    Blood Culture - Blood, Hand, Right [[id number]] Collected:  11/15/19 1359    Specimen:  Blood from Hand, Right Updated:  11/20/19 1431     Blood Culture No growth at 5 days    Basic Metabolic Panel [[id number]]  (Abnormal) Collected:  11/20/19 0613    Specimen:  Blood Updated:  11/20/19 0653     Glucose 88 mg/dL      BUN 21 mg/dL      Creatinine 0.92 mg/dL      Sodium 140 mmol/L      Potassium 4.0 mmol/L      Chloride 100 mmol/L      CO2 36.0 mmol/L      Calcium 9.3 mg/dL      eGFR Non African Amer 80 mL/min/1.73      BUN/Creatinine Ratio 22.8     Anion Gap 4.0 mmol/L     Narrative:       GFR Normal >60  Chronic Kidney Disease <60  Kidney Failure <15    CBC (No Diff) [[id number]]  (Abnormal) Collected:  11/20/19 0613    Specimen:  Blood Updated:  11/20/19 0639      WBC 10.53 10*3/mm3      RBC 4.99 10*6/mm3      Hemoglobin 9.8 g/dL      Hematocrit 36.6 %      MCV 73.3 fL      MCH 19.6 pg      MCHC 26.8 g/dL      RDW 20.6 %      RDW-SD 52.7 fl      MPV 9.8 fL      Platelets 399 10*3/mm3     Vitamin B12 [[id number]]  (Normal) Collected:  11/19/19 0553    Specimen:  Blood Updated:  11/19/19 2013     Vitamin B-12 869 pg/mL     Respiratory Culture - Sputum, Bronchus [[id number]]  (Abnormal)  (Susceptibility) Collected:  11/16/19 1604    Specimen:  Sputum from Bronchus Updated:  11/19/19 0959     Respiratory Culture Light growth (2+) Acinetobacter baumannii      Rare Normal Respiratory Richardson     Gram Stain Greater than 25 WBCs per low power field      Moderate (3+) Epithelial cells per low power field      Moderate (3+) Mixed gram positive richardson      Few (2+) Gram negative bacilli    Susceptibility      Acinetobacter baumannii     ADENIKE     Ampicillin + Sulbactam Susceptible     Cefepime Susceptible     Ceftazidime Susceptible     Gentamicin Susceptible     Piperacillin + Tazobactam Susceptible     Tetracycline Susceptible                    Ferritin [[id number]]  (Abnormal) Collected:  11/17/19 0241    Specimen:  Blood Updated:  11/18/19 0841     Ferritin 5.87 ng/mL     Iron Profile [[id number]]  (Abnormal) Collected:  11/17/19 0241    Specimen:  Blood Updated:  11/18/19 0841     Iron 11 mcg/dL      Iron Saturation 3 %      Transferrin 293 mg/dL      TIBC 437 mcg/dL     CBC Auto Differential [[id number]]  (Abnormal) Collected:  11/18/19 0545    Specimen:  Blood Updated:  11/18/19 0552     WBC 8.35 10*3/mm3      RBC 4.19 10*6/mm3      Hemoglobin 8.4 g/dL      Hematocrit 31.0 %      MCV 74.0 fL      MCH 20.0 pg      MCHC 27.1 g/dL      RDW 20.0 %      RDW-SD 53.1 fl      MPV 9.4 fL      Platelets 333 10*3/mm3      Neutrophil % 91.3 %      Lymphocyte % 4.0 %      Monocyte % 3.0 %      Eosinophil % 0.0 %      Basophil % 0.1 %      Neutrophils, Absolute 7.63 10*3/mm3      Lymphocytes,  Absolute 0.33 10*3/mm3      Monocytes, Absolute 0.25 10*3/mm3      Eosinophils, Absolute 0.00 10*3/mm3      Basophils, Absolute 0.01 10*3/mm3     Basic Metabolic Panel [[id number]]  (Abnormal) Collected:  11/17/19 0241    Specimen:  Blood Updated:  11/17/19 0307     Glucose 199 mg/dL      BUN 19 mg/dL      Creatinine 0.99 mg/dL      Sodium 141 mmol/L      Potassium 4.4 mmol/L      Chloride 105 mmol/L      CO2 26.0 mmol/L      Calcium 8.4 mg/dL      eGFR Non African Amer 73 mL/min/1.73      BUN/Creatinine Ratio 19.2     Anion Gap 10.0 mmol/L     Narrative:       GFR Normal >60  Chronic Kidney Disease <60  Kidney Failure <15    CBC Auto Differential [[id number]]  (Abnormal) Collected:  11/17/19 0241    Specimen:  Blood Updated:  11/17/19 0303     WBC 5.22 10*3/mm3      RBC 3.83 10*6/mm3      Hemoglobin 7.5 g/dL      Hematocrit 27.6 %      MCV 72.1 fL      MCH 19.6 pg      MCHC 27.2 g/dL      RDW 20.3 %      RDW-SD 51.8 fl      MPV 10.1 fL      Platelets 305 10*3/mm3      Neutrophil % 86.8 %      Lymphocyte % 8.6 %      Monocyte % 3.6 %      Eosinophil % 0.0 %      Basophil % 0.0 %      Immature Grans % 1.0 %      Neutrophils, Absolute 4.53 10*3/mm3      Lymphocytes, Absolute 0.45 10*3/mm3      Monocytes, Absolute 0.19 10*3/mm3      Eosinophils, Absolute 0.00 10*3/mm3      Basophils, Absolute 0.00 10*3/mm3      Immature Grans, Absolute 0.05 10*3/mm3      nRBC 2.7 /100 WBC     Blood Gas, Arterial With Co-Ox [[id number]]  (Abnormal) Collected:  11/17/19 0257    Specimen:  Arterial Blood Updated:  11/17/19 0302     Site Right Brachial     Dov's Test N/A     pH, Arterial 7.381 pH units      pCO2, Arterial 49.0 mm Hg      Comment: 83 Value above reference range        pO2, Arterial 75.4 mm Hg      Comment: 84 Value below reference range        HCO3, Arterial 29.0 mmol/L      Comment: 83 Value above reference range        Base Excess, Arterial 3.5 mmol/L      Comment: 83 Value above reference range        O2 Saturation,  Arterial 93.9 %      Comment: 84 Value below reference range        Hemoglobin, Blood Gas 7.5 g/dL      Comment: 84 Value below reference range        Hematocrit, Blood Gas 23.0 %      Comment: 84 Value below reference range        Oxyhemoglobin 91.7 %      Comment: 84 Value below reference range        Methemoglobin 1.30 %      Carboxyhemoglobin 1.0 %      A-a Gradiant 153.4 mmHg      Temperature 37.0 C      Sodium, Arterial 143 mmol/L      Potassium, Arterial 4.2 mmol/L      Barometric Pressure for Blood Gas 754 mmHg      Modality Ventilator     FIO2 40 %      Ventilator Mode AC     Set Tidal Volume 600     Set Mech Resp Rate 10.0     PEEP 5.0     Note --     Collected by [id number]     Comment: Meter: [id number]     :  [id number]        pH, Temp Corrected --     pCO2, Temperature Corrected --     pO2, Temperature Corrected --    Urine Drug Screen - Urine, Clean Catch [[id number]]  (Normal) Collected:  11/16/19 1101    Specimen:  Urine, Clean Catch Updated:  11/16/19 1123     THC, Screen, Urine Negative     Phencyclidine (PCP), Urine Negative     Cocaine Screen, Urine Negative     Methamphetamine, Ur Negative     Opiate Screen Negative     Amphetamine Screen, Urine Negative     Benzodiazepine Screen, Urine Negative     Tricyclic Antidepressants Screen Negative     Methadone Screen, Urine Negative     Barbiturates Screen, Urine Negative     Oxycodone Screen, Urine Negative     Propoxyphene Screen Negative     Buprenorphine, Screen, Urine Negative    Narrative:       Cutoff For Drugs Screened:    Amphetamines               500 ng/ml  Barbiturates               200 ng/ml  Benzodiazepines            150 ng/ml  Cocaine                    150 ng/ml  Methadone                  200 ng/ml  Opiates                    100 ng/ml  Phencyclidine               25 ng/ml  THC                            50 ng/ml  Methamphetamine            500 ng/ml  Tricyclic Antidepressants  300 ng/ml  Oxycodone                  100  ng/ml  Propoxyphene               300 ng/ml  Buprenorphine               10 ng/ml    The normal value for all drugs tested is negative. This report includes unconfirmed screening results, with the cutoff values listed, to be used for medical treatment purposes only.  Unconfirmed results must not be used for non-medical purposes such as employment or legal testing.  Clinical consideration should be applied to any drug of abuse test, particularly when unconfirmed results are used.      Comprehensive Metabolic Panel [[id number]]  (Abnormal) Collected:  11/16/19 0704    Specimen:  Blood Updated:  11/16/19 0737     Glucose 171 mg/dL      BUN 16 mg/dL      Creatinine 1.03 mg/dL      Sodium 142 mmol/L      Potassium 4.3 mmol/L      Chloride 104 mmol/L      CO2 30.0 mmol/L      Calcium 8.5 mg/dL      Total Protein 6.8 g/dL      Albumin 3.10 g/dL      ALT (SGPT) 13 U/L      AST (SGOT) 23 U/L      Alkaline Phosphatase 119 U/L      Total Bilirubin 0.6 mg/dL      eGFR Non African Amer 70 mL/min/1.73      Globulin 3.7 gm/dL      A/G Ratio 0.8 g/dL      BUN/Creatinine Ratio 15.5     Anion Gap 8.0 mmol/L     Narrative:       GFR Normal >60  Chronic Kidney Disease <60  Kidney Failure <15    Lipid Panel [[id number]]  (Abnormal) Collected:  11/16/19 0704    Specimen:  Blood Updated:  11/16/19 0737     Total Cholesterol 103 mg/dL      Triglycerides 145 mg/dL      HDL Cholesterol 25 mg/dL      LDL Cholesterol  49 mg/dL      VLDL Cholesterol 29 mg/dL      LDL/HDL Ratio 1.96    Narrative:       Cholesterol Reference Ranges  (U.S. Department of Health and Human Services ATP III Classifications)    Desirable          <200 mg/dL  Borderline High    200-239 mg/dL  High Risk          >240 mg/dL      Triglyceride Reference Ranges  (U.S. Department of Health and Human Services ATP III Classifications)    Normal           <150 mg/dL  Borderline High  150-199 mg/dL  High             200-499 mg/dL  Very High        >500 mg/dL    HDL Reference  Ranges  (U.S. Department of Health and Human Services ATP III Classifcations)    Low     <40 mg/dl (major risk factor for CHD)  High    >60 mg/dl ('negative' risk factor for CHD)        LDL Reference Ranges  (U.S. Department of Health and Human Services ATP III Classifcations)    Optimal          <100 mg/dL  Near Optimal     100-129 mg/dL  Borderline High  130-159 mg/dL  High             160-189 mg/dL  Very High        >189 mg/dL    TSH [[id number]]  (Normal) Collected:  11/16/19 0704    Specimen:  Blood Updated:  11/16/19 0737     TSH 0.407 uIU/mL     CBC Auto Differential [[id number]]  (Abnormal) Collected:  11/16/19 0704    Specimen:  Blood Updated:  11/16/19 0729     WBC 4.97 10*3/mm3      RBC 4.17 10*6/mm3      Hemoglobin 8.1 g/dL      Hematocrit 29.9 %      MCV 71.7 fL      MCH 19.4 pg      MCHC 27.1 g/dL      RDW 20.2 %      RDW-SD 51.7 fl      MPV 9.9 fL      Platelets 376 10*3/mm3      Neutrophil % 79.3 %      Lymphocyte % 17.3 %      Monocyte % 2.4 %      Eosinophil % 0.0 %      Basophil % 0.2 %      Neutrophils, Absolute 3.94 10*3/mm3      Lymphocytes, Absolute 0.86 10*3/mm3      Monocytes, Absolute 0.12 10*3/mm3      Eosinophils, Absolute 0.00 10*3/mm3      Basophils, Absolute 0.01 10*3/mm3     Hemoglobin A1c [[id number]]  (Abnormal) Collected:  11/16/19 0704    Specimen:  Blood Updated:  11/16/19 0726     Hemoglobin A1C 6.00 %     Narrative:       Hemoglobin A1C Ranges:    Increased Risk for Diabetes  5.7% to 6.4%  Diabetes                     >= 6.5%  Diabetic Goal                < 7.0%    Blood Gas, Arterial [[id number]]  (Abnormal) Collected:  11/16/19 0349    Specimen:  Arterial Blood Updated:  11/16/19 0358     Site Right Radial     Dov's Test Positive     pH, Arterial 7.489 pH units      Comment: 83 Value above reference range        pCO2, Arterial 41.1 mm Hg      pO2, Arterial 56.8 mm Hg      Comment: 84 Value below reference range        HCO3, Arterial 31.2 mmol/L      Comment: 83 Value above  reference range        Base Excess, Arterial 7.2 mmol/L      Comment: 83 Value above reference range        O2 Saturation, Arterial 90.9 %      Comment: 84 Value below reference range        Temperature 37.0 C      Barometric Pressure for Blood Gas 758 mmHg      Modality Ventilator     FIO2 40 %      Ventilator Mode AC     Set Tidal Volume 600     Set Mech Resp Rate 18.0     PEEP 5.0     Collected by [id number]     Comment: Meter: [id number]     :  [id number]       Blood Gas, Arterial [[id number]]  (Abnormal) Collected:  11/15/19 2043    Specimen:  Arterial Blood Updated:  11/15/19 2053     Site Right Brachial     Dov's Test N/A     pH, Arterial 7.291 pH units      Comment: 84 Value below reference range        pCO2, Arterial 66.8 mm Hg      Comment: 83 Value above reference range        pO2, Arterial 121.0 mm Hg      Comment: 83 Value above reference range        HCO3, Arterial 32.2 mmol/L      Comment: 83 Value above reference range        Base Excess, Arterial 4.7 mmol/L      Comment: 83 Value above reference range        O2 Saturation, Arterial 98.5 %      Temperature 37.0 C      Barometric Pressure for Blood Gas 759 mmHg      Modality Ventilator     FIO2 60 %      Ventilator Mode AC     Set Tidal Volume 600     Set Mech Resp Rate 14.0     PEEP 5.0     Collected by [id number]     Comment: Meter: [id number]     :  [id number]       Blood Gas, Arterial [[id number]]  (Abnormal) Collected:  11/15/19 1630    Specimen:  Arterial Blood Updated:  11/15/19 1640     Site Right Radial     Dov's Test Positive     pH, Arterial 7.309 pH units      Comment: 84 Value below reference range        pCO2, Arterial 66.1 mm Hg      Comment: 83 Value above reference range        pO2, Arterial 76.8 mm Hg      Comment: 84 Value below reference range        HCO3, Arterial 33.2 mmol/L      Comment: 83 Value above reference range        Base Excess, Arterial 5.9 mmol/L      Comment: 83 Value above reference range        O2  Saturation, Arterial 94.4 %      Temperature 37.0 C      Barometric Pressure for Blood Gas 759 mmHg      Modality BiPap     FIO2 30 %      Ventilator Mode NA     Set Mech Resp Rate 16.0     IPAP 20     Comment: Meter: [id number]     :  [id number]        EPAP 6     Collected by [id number]    Blood Gas, Arterial [[id number]]  (Abnormal) Collected:  11/15/19 1514    Specimen:  Arterial Blood Updated:  11/15/19 1518     Site Left Radial     Dov's Test Positive     pH, Arterial 7.321 pH units      Comment: 84 Value below reference range        pCO2, Arterial 64.5 mm Hg      Comment: 83 Value above reference range        pO2, Arterial 74.0 mm Hg      Comment: 84 Value below reference range        HCO3, Arterial 33.3 mmol/L      Comment: 83 Value above reference range        Base Excess, Arterial 6.2 mmol/L      Comment: 83 Value above reference range        O2 Saturation, Arterial 93.9 %      Comment: 84 Value below reference range        Temperature 37.0 C      Barometric Pressure for Blood Gas 759 mmHg      Modality Heated HFNC     FIO2 35 %      Flow Rate 40.0 lpm      Ventilator Mode NA     Collected by [id number]     Comment: Meter: [id number]     :  [id number]       D-dimer, Quantitative [[id number]]  (Abnormal) Collected:  11/15/19 1355    Specimen:  Blood Updated:  11/15/19 1501     D-Dimer, Quantitative 1.81 mg/L (FEU)     Narrative:       Reference Range is 0-0.50 mg/L FEU. However, results <0.50 mg/L FEU tends to rule out DVT or PE. Results >0.50 mg/L FEU are not useful in predicting absence or presence of DVT or PE.    Manual Differential [[id number]]  (Abnormal) Collected:  11/15/19 1355    Specimen:  Blood Updated:  11/15/19 1443     Neutrophil % 72.7 %      Lymphocyte % 8.1 %      Monocyte % 4.0 %      Eosinophil % 12.1 %      Basophil % 2.0 %      Atypical Lymphocyte % 1.0 %      Neutrophils Absolute 4.78 10*3/mm3      Lymphocytes Absolute 0.53 10*3/mm3      Monocytes Absolute 0.26 10*3/mm3       Eosinophils Absolute 0.79 10*3/mm3      Basophils Absolute 0.13 10*3/mm3      nRBC 4.0 /100 WBC      Anisocytosis Slight/1+     Hypochromia Mod/2+     Microcytes Slight/1+     Poikilocytes Slight/1+     Polychromasia Slight/1+     WBC Morphology Normal     Giant Platelets Large/3+    CBC Auto Differential [[id number]]  (Abnormal) Collected:  11/15/19 1355    Specimen:  Blood Updated:  11/15/19 1443     WBC 6.57 10*3/mm3      RBC 4.34 10*6/mm3      Hemoglobin 8.5 g/dL      Hematocrit 32.4 %      MCV 74.7 fL      MCH 19.6 pg      MCHC 26.2 g/dL      RDW 20.5 %      RDW-SD 54.5 fl      MPV 9.5 fL      Platelets 372 10*3/mm3     Comprehensive Metabolic Panel [[id number]]  (Abnormal) Collected:  11/15/19 1355    Specimen:  Blood Updated:  11/15/19 1439     Glucose 128 mg/dL      BUN 14 mg/dL      Creatinine 1.06 mg/dL      Sodium 144 mmol/L      Potassium 3.8 mmol/L      Chloride 103 mmol/L      CO2 33.0 mmol/L      Calcium 9.0 mg/dL      Total Protein 7.6 g/dL      Albumin 3.70 g/dL      ALT (SGPT) 17 U/L      AST (SGOT) 27 U/L      Alkaline Phosphatase 145 U/L      Total Bilirubin 0.6 mg/dL      eGFR Non African Amer 68 mL/min/1.73      Globulin 3.9 gm/dL      A/G Ratio 0.9 g/dL      BUN/Creatinine Ratio 13.2     Anion Gap 8.0 mmol/L     Narrative:       GFR Normal >60  Chronic Kidney Disease <60  Kidney Failure <15    BNP [[id number]]  (Normal) Collected:  11/15/19 1355    Specimen:  Blood Updated:  11/15/19 1435     proBNP 1,550.0 pg/mL     Narrative:       Among patients with dyspnea, NT-proBNP is highly sensitive for the detection of acute congestive heart failure. In addition NT-proBNP of <300 pg/ml effectively rules out acute congestive heart failure with 99% negative predictive value.    Troponin [[id number]]  (Normal) Collected:  11/15/19 1355    Specimen:  Blood Updated:  11/15/19 1435     Troponin T 0.012 ng/mL     Narrative:       Troponin T Reference Range:  <= 0.03 ng/mL-   Negative for AMI  >0.03 ng/mL-      Abnormal for myocardial necrosis.  Clinicians would have to utilize clinical acumen, EKG, Troponin and serial changes to determine if it is an Acute Myocardial Infarction or myocardial injury due to an underlying chronic condition.     Magnesium [[id number]]  (Normal) Collected:  11/15/19 1355    Specimen:  Blood Updated:  11/15/19 1435     Magnesium 2.0 mg/dL     Lactic Acid, Plasma [[id number]]  (Normal) Collected:  11/15/19 1355    Specimen:  Blood Updated:  11/15/19 1431     Lactate 1.1 mmol/L     Blood Gas, Arterial [[id number]]  (Abnormal) Collected:  11/15/19 1356    Specimen:  Arterial Blood Updated:  11/15/19 1401     Site Left Radial     Dov's Test Positive     pH, Arterial 7.355 pH units      pCO2, Arterial 57.5 mm Hg      Comment: 83 Value above reference range        pO2, Arterial 45.9 mm Hg      Comment: 85 Value below critical limit        HCO3, Arterial 32.1 mmol/L      Comment: 83 Value above reference range        Base Excess, Arterial 5.7 mmol/L      Comment: 83 Value above reference range        O2 Saturation, Arterial 80.0 %      Comment: 84 Value below reference range        Temperature 37.0 C      Barometric Pressure for Blood Gas 759 mmHg      Modality Room Air     Ventilator Mode NA     Notified Who DR YOU     Notified By [id number]     Notified Time 11/15/2019 14:01     Collected by [id number]     Comment: Meter: [id number]     :  [id number]           Hospital Course:  The patient was originally admitted on 11/15 by Charmaine Yao and Dr. Lamb.  He presented to the emergency department with shortness of breath. He was felt to be having an exacerbation of chronic obstructive pulmonary disease secondary to long-standing and ongoing tobacco abuse.  His carbon dioxide retention and pH continued to worsen.  Trials of BiPAP were unsuccessful at controlling this.  He ultimately required intubation mechanical ventilation.  He was extubated on 11/17.     He was followed by the pulmonary  service.  He will require 2 L nasal cannula at discharge.     He has finished Rocephin.  He has grown pan susceptible Acinetobacter on sputum culture.  He does not have a clear pneumonia.  This is likely colonization. Mucinex and incentive spirometry. Continue inhaled bronchodilators. Started Symbicort on 11/18. Weaned Solu-Medrol to oral prednisone on 11/19.     Counseled for tobacco cessation.  Nicotine patch offered.     He will need a repeat noncontrasted CT scan of the chest in 6 months to reevaluate his left upper lobe pulmonary nodule.     PA lateral chest x-ray on 11/20 showed mild bibasilar atelectasis with additional trace layering pleural effusions with no obvious inflammatory infiltrate.     Started lisinopril on 11/19 and have uptitrated.   Added metoprolol tartrate on 11/20.  He has some diastolic dysfunction on echocardiogram.  His BNP was normal at presentation, but he has had some complaints of scrotal edema and now has worsening lower extremity edema. Lasix IV BID was given for a few days with improvements.  He will be discharged on 40 mg of oral Lasix on a daily basis.  He has been encouraged to restrict his fluid intake somewhat and elevate his legs when not active.     He was hyperglycemic secondary to steroids. Hemoglobin A1c 6.0.      The patient appears to have a chronic microcytic anemia.  He has been transfused 1 unit packed red blood cells on 11/17.  Anemia substrates were not assessed prior to the administration of blood products, but I was able to add them to old blood.  He is significantly iron deficient.  IV Venofer.  Dr. Caldera had him set up to have an outpatient colonoscopy on 11/18, however, he was obviously admitted.  This will need to be rescheduled as an outpatient for further evaluation of his chronic microcytic, iron deficient anemia.     B12 normal.     Bowel regimen in place.      Lovenox was used for DVT prophylaxis.     Pepcid was used for peptic ulcer prophylaxis.     He  "has been accepted to a skilled nursing facility and per year, [place] today. It is called [facility].  This is close to his home.  This is also close to his sister and his daughter.  His ultimate plan is to go there for strengthening and continued medical tuning.  He ultimately plans to return home with home health and assistance from friends and family.    Physical Exam on Discharge:  /64 (BP Location: Right arm, Patient Position: Sitting)   Pulse 77   Temp 98 °F (36.7 °C) (Oral)   Resp 16   Ht 162.6 cm (64.02\")   Wt 87.9 kg (193 lb 12.8 oz)   SpO2 96%   BMI 33.25 kg/m²    Physical Exam  Constitutional: He is oriented to person, place, and time. He appears well-developed and well-nourished. Up on the side of the bed.  No distress.  No family present. Discussed with his nurse, Lauren.    Head: Normocephalic and atraumatic.   Eyes: Conjunctivae and EOM are normal. Pupils are equal, round, and reactive to light.   Neck: Neck supple. No JVD present.   Cardiovascular: Normal rate, regular rhythm, normal heart sounds and intact distal pulses. Exam reveals no gallop and no friction rub.   No murmur heard.  Pulmonary/Chest: Effort normal. No respiratory distress. He has wheezes (slight). He has no rales. He exhibits no tenderness. 2L.   Abdominal: Soft. Bowel sounds are normal. He exhibits distension. There is no tenderness. There is no rebound and no guarding.   Musculoskeletal: Normal range of motion. He exhibits no edema, tenderness or deformity. Trace to 1+ LE edema ( improved from yesterday) and with improving scrotal edema.    Neurological: He is alert and oriented to person, place, and time. He displays normal reflexes. No cranial nerve deficit. He exhibits normal muscle tone.   Skin: Skin is warm and dry. No rash noted.   Psychiatric: He has a normal mood and affect. His behavior is normal. Judgment and thought content normal.     Condition on Discharge: Good.     Discharge Disposition:  Skilled " Nursing Facility ([place] - External): [facility] in [place], [place].     Discharge Medications:     Discharge Medications      New Medications      Instructions Start Date   aluminum-magnesium hydroxide-simethicone 400-400-40 MG/5ML suspension  Commonly known as:  MAALOX MAX   15 mL, Oral, Every 6 Hours PRN      bisacodyl 5 MG EC tablet  Commonly known as:  DULCOLAX   10 mg, Oral, Daily PRN      budesonide-formoterol 160-4.5 MCG/ACT inhaler  Commonly known as:  SYMBICORT   2 puffs, Inhalation, 2 Times Daily - RT      ferrous sulfate 325 (65 FE) MG tablet  Commonly known as:  FERROUSUL   325 mg, Oral, Daily With Breakfast      furosemide 40 MG tablet  Commonly known as:  LASIX   40 mg, Oral, Daily      guaiFENesin 600 MG 12 hr tablet  Commonly known as:  MUCINEX   1,200 mg, Oral, Every 12 Hours Scheduled      HYDROcodone-acetaminophen 5-325 MG per tablet  Commonly known as:  NORCO   1 tablet, Oral, Every 6 Hours PRN      hydrocortisone 25 MG suppository  Commonly known as:  ANUSOL-HC   25 mg, Rectal, 2 Times Daily      hydrOXYzine 50 MG tablet  Commonly known as:  ATARAX   50 mg, Oral, Nightly PRN      ipratropium-albuterol 0.5-2.5 mg/3 ml nebulizer  Commonly known as:  DUO-NEB   3 mL, Nebulization, Every 6 Hours - RT      lisinopril 10 MG tablet  Commonly known as:  PRINIVIL,ZESTRIL   10 mg, Oral, Every 24 Hours Scheduled   Start Date:  11/23/2019     metoprolol tartrate 25 MG tablet  Commonly known as:  LOPRESSOR   25 mg, Oral, Every 12 Hours Scheduled      nicotine 21 MG/24HR patch  Commonly known as:  NICODERM CQ   1 patch, Transdermal, Nightly      ondansetron 4 MG tablet  Commonly known as:  ZOFRAN   4 mg, Oral, Every 6 Hours PRN      polyethylene glycol pack packet  Commonly known as:  MIRALAX   17 g, Oral, Daily   Start Date:  11/23/2019     predniSONE 10 MG tablet  Commonly known as:  DELTASONE   Take 4 tablets daily for 3 days, then take 2 tablets daily for 3 days, then take 1 tablet daily for 3 days, then stop.            Discharge Diet:   Diet Instructions     Diet: Cardiac; Thin      Discharge Diet:  Cardiac    Fluid Consistency:  Thin        Activity at Discharge:   Activity Instructions     Activity as Tolerated          Follow-up Appointments:   1. PCP or SNF physician in 1 week.   2. Dr. Ramey per him.   3. PCP to reschedule his missed colonoscopy when medically stable.  4.  PCP or Dr. Ramey to get a noncontrast CT in 6 months to reevaluate pulmonary nodularity.    Test Results Pending at Discharge: None.     Delvin Lopez DO  11/22/19  11:46 AM    Time: 40 minutes.           Electronically signed by Delvin Lopez DO at 11/22/19 8793

## 2019-11-22 NOTE — THERAPY TREATMENT NOTE
Acute Care - Physical Therapy Treatment Note  [facility]     Patient Name: Rodrigo Javed  : 1940  MRN: [id number]  Today's Date: 2019             Admit Date: 11/15/2019    Visit Dx:    ICD-10-CM ICD-9-CM   1. Acute respiratory failure with hypoxia and hypercapnia (CMS/HCC) J96.01 518.81    J96.02    2. Gait abnormality R26.9 781.2     Patient Active Problem List   Diagnosis   • Acute respiratory failure with hypoxia and hypercapnia (CMS/HCC)   • Essential hypertension   • Microcytic anemia   • COPD with acute exacerbation (CMS/HCC)   • Acute diastolic heart failure (CMS/HCC)   • Pulmonary nodule   • Hyperglycemia   • Alcoholism /alcohol abuse (CMS/HCC)   • Chronic constipation       Therapy Treatment    Rehabilitation Treatment Summary     Row Name 19             Treatment Time/Intention    Discipline  physical therapy assistant  (Pended)   -KM      Document Type  therapy note (daily note)  (Pended)   -KM      Subjective Information  no complaints  (Pended)   -KM2      Mode of Treatment  physical therapy  (Pended)   -KM2      Patient/Family Observations  No family present  (Pended)   -KM2      Patient Effort  good  (Pended)   -KM2      Existing Precautions/Restrictions  fall  (Pended)   -KM2      Recorded by [KM] Claudia Martínez PTA Student 19  [KM2] Claudia Martínez PTA Student 19      Row Name 19             Safety Issues, Functional Mobility    Safety Issues Affecting Function (Mobility)  safety precaution awareness;impulsivity  (Pended)   -KM      Impairments Affecting Function (Mobility)  balance;endurance/activity tolerance  (Pended)   -KM      Recorded by [KM] Claudia Martínez PTA Student 19      Row Name 19             Bed Mobility Assessment/Treatment    Comment (Bed Mobility)  Patient sitting EOB  (Pended)   -KM      Recorded by [KM] Claudia Martínez PTA Student 19      Row Name 19             Transfer  Assessment/Treatment    Transfer Assessment/Treatment  sit-stand transfer;stand-sit transfer  (Pended)   -KM      Recorded by [KM] Claudia Martínez PTA Student 11/22/19 0959      Row Name 11/22/19 0927             Sit-Stand Transfer    Sit-Stand [place] (Transfers)  stand by assist  (Pended)   -KM      Assistive Device (Sit-Stand Transfers)  walker, front-wheeled  (Pended)   -KM      Recorded by [KM] Claudia Martínez PTA Student 11/22/19 0959      Row Name 11/22/19 0927             Stand-Sit Transfer    Stand-Sit [place] (Transfers)  stand by assist  (Pended)   -KM      Assistive Device (Stand-Sit Transfers)  walker, front-wheeled  (Pended)   -KM      Recorded by [KM] Claudia Martínez PTA Student 11/22/19 0959      Row Name 11/22/19 0927             Gait/Stairs Assessment/Training    Gait/Stairs Assessment/Training  gait/ambulation independence  (Pended)   -KM      [place] Level (Gait)  stand by assist  (Pended)   -KM      Assistive Device (Gait)  walker, front-wheeled  (Pended)   -KM      Distance in Feet (Gait)  75x2  (Pended)   -KM      Pattern (Gait)  step-through  (Pended)   -KM      Deviations/Abnormal Patterns (Gait)  gait speed decreased;[place] decreased  (Pended)   -KM      Bilateral Gait Deviations  heel strike decreased  (Pended)   -KM      Recorded by [KM] Claudia Martínez PTA Student 11/22/19 0959      Row Name 11/22/19 0927             Positioning and Restraints    Pre-Treatment Position  in bed  (Pended)  sitting EOB  -KM      Post Treatment Position  bed  (Pended)  Sitting EOB  -KM      In Bed  sitting EOB;call light within reach;encouraged to call for assist  (Pended)   -KM      Recorded by [KM] Claudia Martínez PTA Student 11/22/19 0959      Row Name 11/22/19 0927             Pain Scale: Numbers Pre/Post-Treatment    Pain Scale: Numbers, Pretreatment  0/10 - no pain  (Pended)   -KM      Pain Scale: Numbers, Post-Treatment  0/10 - no pain  (Pended)   -KM      Recorded by [KM] Claudia Martínez PTA  Student 11/22/19 0959        User Key  (r) = Recorded By, (t) = Taken By, (c) = Cosigned By    Initials Name Effective Dates Discipline    Claudia Gaviria PTA Student 11/06/19 -  PT                   Physical Therapy Education     Title: PT OT SLP Therapies (Done)     Topic: Physical Therapy (Done)     Point: Mobility training (Done)     Learning Progress Summary           Patient Acceptance, E,TB,D, VU,DU,NR by  at 11/19/2019  9:36 AM    Comment:  Education re: purpose of PT/importance of activity, proper use of IS, edema mgmt to include LE elevation and freq aps, and safety/falls prevention to include proper placement of hands w/ tfers w/ wx and proper placement of rwx and sequencing                   Point: Home exercise program (Done)     Learning Progress Summary           Patient Acceptance, E,TB,D, VU,DU,NR by  at 11/19/2019  9:36 AM    Comment:  Education re: purpose of PT/importance of activity, proper use of IS, edema mgmt to include LE elevation and freq aps, and safety/falls prevention to include proper placement of hands w/ tfers w/ wx and proper placement of rwx and sequencing                   Point: Precautions (Done)     Learning Progress Summary           Patient Acceptance, E,TB,D, VU,DU,NR by  at 11/19/2019  9:36 AM    Comment:  Education re: purpose of PT/importance of activity, proper use of IS, edema mgmt to include LE elevation and freq aps, and safety/falls prevention to include proper placement of hands w/ tfers w/ wx and proper placement of rwx and sequencing                               User Key     Initials Effective Dates Name Provider Type Discipline     08/02/18 -  Jayna Webber PT Physical Therapist PT                PT Recommendation and Plan     Plan of Care Reviewed With: (P) patient  Progress: (P) improving  Outcome Summary: (P) Pt feeling better this morning than he did yesterday morning and agrees to therapy. Pt performs transfers sit to and from stand sba.  Ambulated 75ft x 2 cga. Patient would benefit from more rehab upon d/c.      Time Calculation:   PT Charges     Row Name 11/22/19 1002             Time Calculation    Start Time  0927  (Pended)   -KM      Stop Time  0954  (Pended)   -KM      Time Calculation (min)  27 min  (Pended)   -KM      PT Received On  11/22/19  (Pended)   -KM      PT Goal Re-Cert Due Date  11/29/19  (Pended)   -KM         Time Calculation- PT    Total Timed Code Minutes- PT  27 minute(s)  (Pended)   -KM         Timed Charges    31550 - Gait Training Minutes   27  (Pended)   -KM        User Key  (r) = Recorded By, (t) = Taken By, (c) = Cosigned By    Initials Name Provider Type    Claudia Gaviria PTA Student PTA Student        Therapy Charges for Today     Code Description Service Date Service Provider Modifiers Qty    69855101367 HC GAIT TRAINING EA 15 MIN 11/21/2019 Claudia Martínez PTA Student GP 2    95275503780 HC GAIT TRAINING EA 15 MIN 11/22/2019 Claudia Martínez PTA Student GP 2          PT G-Codes  Outcome Measure Options: AM-PAC 6 Clicks Basic Mobility (PT)  AM-PAC 6 Clicks Score (PT): 18    Claudia Martínez PTA Student  11/22/2019

## 2019-11-22 NOTE — PLAN OF CARE
Problem: Patient Care Overview  Goal: Plan of Care Review  Outcome: Ongoing (interventions implemented as appropriate)   11/22/19 5508   Coping/Psychosocial   Plan of Care Reviewed With patient   Plan of Care Review   Progress improving   OTHER   Outcome Summary Pt feeling better this morning than he did yesterday morning and agrees to therapy. No complaints of pain. Pt performs transfers sit to and from stand sba. Ambulated 75ft x 2 cga. Patient would benefit from more rehab upon d/c.

## 2019-11-23 NOTE — THERAPY DISCHARGE NOTE
Acute Care - Physical Therapy Discharge Summary  [facility]       Patient Name: Rodrigo Javed  : 1940  MRN: [id number]    Today's Date: 2019                 Admit Date: 11/15/2019      PT Recommendation and Plan    Visit Dx:    ICD-10-CM ICD-9-CM   1. Acute respiratory failure with hypoxia and hypercapnia (CMS/[facility]) J96.01 518.81    J96.02    2. Gait abnormality R26.9 781.2   3. COPD with acute exacerbation (CMS/[facility]) J44.1 491.21               Rehab Goal Summary     Row Name 19 0722             Bed Mobility Goal 1 (PT)    Activity/Assistive Device (Bed Mobility Goal 1, PT)  bed mobility activities, all  -MF      [place] Level/Cues Needed (Bed Mobility Goal 1, PT)  independent  -MF      Time Frame (Bed Mobility Goal 1, PT)  long term goal (LTG);10 days  -MF      Progress/Outcomes (Bed Mobility Goal 1, PT)  goal not met  -MF         Transfer Goal 1 (PT)    Activity/Assistive Device (Transfer Goal 1, PT)  sit-to-stand/stand-to-sit;bed-to-chair/chair-to-bed;walker, rolling  -MF      [place] Level/Cues Needed (Transfer Goal 1, PT)  standby assist;conditional independence  -MF      Time Frame (Transfer Goal 1, PT)  long term goal (LTG);10 days  -MF      Progress/Outcome (Transfer Goal 1, PT)  goal not met  -MF         Gait Training Goal 1 (PT)    Activity/Assistive Device (Gait Training Goal 1, PT)  gait (walking locomotion);assistive device use;decrease fall risk;diminish gait deviation;improve balance and speed;increase endurance/gait distance;increase energy conservation;walker, rolling  -MF      [place] Level (Gait Training Goal 1, PT)  standby assist  -MF      Distance (Gait Goal 1, PT)  200 ft w/ less than or equal to 1 standing rest  -MF      Time Frame (Gait Training Goal 1, PT)  long term goal (LTG);10 days  -MF      Progress/Outcome (Gait Training Goal 1, PT)  goal not met  -MF         Stairs Goal 1 (PT)    Activity/Assistive Device (Stairs Goal 1, PT)  ascending  stairs;descending stairs;using handrail, right;using handrail, left;step-to-step  -      [place] Level/Cues Needed (Stairs Goal 1, PT)  contact guard assist  -MF      Number of Stairs (Stairs Goal 1, PT)  5  -MF      Time Frame (Stairs Goal 1, PT)  long term goal (LTG);10 days  -MF      Progress/Outcome (Stairs Goal 1, PT)  goal not met  -         Patient Education Goal (PT)    Activity (Patient Education Goal, PT)  energy conservation techniques, edema mgmt techniques  -      [place]/Cues/Accuracy (Memory Goal 2, PT)  demonstrates adequately;independent;verbalizes understanding  -      Time Frame (Patient Education Goal, PT)  long term goal (LTG);10 days  -MF      Progress/Outcome (Patient Education Goal, PT)  goal not met  -        User Key  (r) = Recorded By, (t) = Taken By, (c) = Cosigned By    Initials Name Provider Type Discipline     Varsha King, PTA Physical Therapy Assistant PT              PT Discharge Summary  Anticipated Discharge Disposition (PT): skilled nursing facility  Reason for Discharge: Discharge from facility  Outcomes Achieved: Unable to make functional progress toward goals at this time  Discharge Destination: SNF      Varsha King PTA   11/23/2019

## 2019-11-25 ENCOUNTER — NURSE TRIAGE (OUTPATIENT)
Dept: CALL CENTER | Facility: HOSPITAL | Age: 79
End: 2019-11-25

## 2019-11-25 NOTE — TELEPHONE ENCOUNTER
"Caller states that he still has lower extremity edema.  Caller states that he was discharged from [facility] to a  NH/Rehab facility at discharge.  Caller states that he has not been receiving his medicines.  He states that he spoke with the  today and he has received meds this afternoon.  Caller questioned at length.  He denies abuse states that he just isn't getting medicines like he should.  AVS reviewed with caller and meds discussed at length.  Caller is hoping to be discharged by Thursday or Friday.  [facility] Ombudsman number given to caller if needs further assistance.  When questioned about family he was evasive but in discharge summary his family was mentioned about helping him when he returns home.  Caller seemed oriented.  Instructed him to talk to the director of nursing if he continues have issues.  Voiced understanding.    Reason for Disposition  • Health Information question, no triage required and triager able to answer question    Additional Information  • Negative: [1] Caller is not with the adult (patient) AND [2] reporting urgent symptoms  • Negative: Lab result questions  • Negative: Medication questions  • Negative: Caller cannot be reached by phone  • Negative: Caller has already spoken to PCP or another triager  • Negative: RN needs further essential information from caller in order to complete triage  • Negative: Requesting regular office appointment  • Negative: [1] Caller requesting NON-URGENT health information AND [2] PCP's office is the best resource  • Negative: General information question, no triage required and triager able to answer question    Answer Assessment - Initial Assessment Questions  1. REASON FOR CALL or QUESTION: \"What is your reason for calling today?\" or \"How can I best help you?\" or \"What question do you have that I can help answer?\"      My feet aren't getting any better.    Protocols used: INFORMATION ONLY CALL-ADULT-AH      "

## 2019-12-09 ENCOUNTER — TRANSCRIBE ORDERS (OUTPATIENT)
Dept: ADMINISTRATIVE | Facility: HOSPITAL | Age: 79
End: 2019-12-09

## 2019-12-09 ENCOUNTER — APPOINTMENT (OUTPATIENT)
Dept: LAB | Facility: HOSPITAL | Age: 79
End: 2019-12-09

## 2019-12-09 DIAGNOSIS — I10 ESSENTIAL HYPERTENSION, MALIGNANT: ICD-10-CM

## 2019-12-09 DIAGNOSIS — D50.9 IRON DEFICIENCY ANEMIA, UNSPECIFIED IRON DEFICIENCY ANEMIA TYPE: Primary | ICD-10-CM

## 2019-12-09 LAB
ALBUMIN SERPL-MCNC: 3.9 G/DL (ref 3.5–5)
ALBUMIN/GLOB SERPL: 1.1 G/DL (ref 1.1–2.5)
ALP SERPL-CCNC: 93 U/L (ref 24–120)
ALT SERPL W P-5'-P-CCNC: 24 U/L (ref 0–54)
ANION GAP SERPL CALCULATED.3IONS-SCNC: 8 MMOL/L (ref 4–13)
AST SERPL-CCNC: 24 U/L (ref 7–45)
AUTO MIXED CELLS #: 0.7 10*3/MM3 (ref 0.1–2.6)
AUTO MIXED CELLS %: 11.4 % (ref 0.1–24)
BILIRUB SERPL-MCNC: 0.6 MG/DL (ref 0.1–1)
BUN BLD-MCNC: 12 MG/DL (ref 5–21)
BUN/CREAT SERPL: 11.3
CALCIUM SPEC-SCNC: 8.9 MG/DL (ref 8.4–10.4)
CHLORIDE SERPL-SCNC: 110 MMOL/L (ref 98–110)
CO2 SERPL-SCNC: 26 MMOL/L (ref 24–31)
CREAT BLD-MCNC: 1.06 MG/DL (ref 0.5–1.4)
ERYTHROCYTE [DISTWIDTH] IN BLOOD BY AUTOMATED COUNT: 26.3 % (ref 12.3–15.4)
GFR SERPL CREATININE-BSD FRML MDRD: 68 ML/MIN/1.73
GLOBULIN UR ELPH-MCNC: 3.6 GM/DL
GLUCOSE BLD-MCNC: 102 MG/DL (ref 70–100)
HCT VFR BLD AUTO: 36.4 % (ref 37.5–51)
HGB BLD-MCNC: 10.4 G/DL (ref 13–17.7)
LYMPHOCYTES # BLD AUTO: 1.8 10*3/MM3 (ref 0.7–3.1)
LYMPHOCYTES NFR BLD AUTO: 29.7 % (ref 19.6–45.3)
MCH RBC QN AUTO: 22.8 PG (ref 26.6–33)
MCHC RBC AUTO-ENTMCNC: 28.6 G/DL (ref 31.5–35.7)
MCV RBC AUTO: 79.6 FL (ref 79–97)
NEUTROPHILS # BLD AUTO: 3.4 10*3/MM3 (ref 1.7–7)
NEUTROPHILS NFR BLD AUTO: 58.9 % (ref 42.7–76)
PLATELET # BLD AUTO: 304 10*3/MM3 (ref 140–450)
PMV BLD AUTO: 8.3 FL (ref 6–12)
POTASSIUM BLD-SCNC: 4.8 MMOL/L (ref 3.5–5.3)
PROT SERPL-MCNC: 7.5 G/DL (ref 6.3–8.7)
RBC # BLD AUTO: 4.57 10*6/MM3 (ref 4.14–5.8)
SODIUM BLD-SCNC: 144 MMOL/L (ref 135–145)
WBC NRBC COR # BLD: 5.9 10*3/MM3 (ref 3.4–10.8)

## 2019-12-09 PROCEDURE — 80053 COMPREHEN METABOLIC PANEL: CPT | Performed by: NURSE PRACTITIONER

## 2019-12-09 PROCEDURE — 85025 COMPLETE CBC W/AUTO DIFF WBC: CPT | Performed by: NURSE PRACTITIONER

## 2019-12-09 PROCEDURE — 36415 COLL VENOUS BLD VENIPUNCTURE: CPT | Performed by: NURSE PRACTITIONER

## 2020-01-01 ENCOUNTER — APPOINTMENT (OUTPATIENT)
Dept: GENERAL RADIOLOGY | Facility: HOSPITAL | Age: 80
End: 2020-01-01

## 2020-01-01 ENCOUNTER — APPOINTMENT (OUTPATIENT)
Dept: CT IMAGING | Facility: HOSPITAL | Age: 80
End: 2020-01-01

## 2020-01-01 ENCOUNTER — HOSPITAL ENCOUNTER (EMERGENCY)
Facility: HOSPITAL | Age: 80
Discharge: HOME | End: 2020-12-30
Admitting: EMERGENCY MEDICINE

## 2020-01-01 ENCOUNTER — LAB (OUTPATIENT)
Dept: LAB | Facility: HOSPITAL | Age: 80
End: 2020-01-01

## 2020-01-01 ENCOUNTER — TELEPHONE (OUTPATIENT)
Dept: ONCOLOGY | Facility: CLINIC | Age: 80
End: 2020-01-01

## 2020-01-01 ENCOUNTER — HOSPITAL ENCOUNTER (OUTPATIENT)
Dept: CT IMAGING | Facility: HOSPITAL | Age: 80
Discharge: HOME | End: 2020-12-10

## 2020-01-01 ENCOUNTER — TELEPHONE (OUTPATIENT)
Dept: RADIATION ONCOLOGY | Facility: HOSPITAL | Age: 80
End: 2020-01-01

## 2020-01-01 ENCOUNTER — CONSULT (OUTPATIENT)
Dept: RADIATION ONCOLOGY | Facility: HOSPITAL | Age: 80
End: 2020-01-01

## 2020-01-01 ENCOUNTER — HOSPITAL ENCOUNTER (OUTPATIENT)
Dept: CT IMAGING | Facility: HOSPITAL | Age: 80
Discharge: HOME | End: 2020-10-30

## 2020-01-01 ENCOUNTER — TRANSCRIBE ORDERS (OUTPATIENT)
Dept: ADMINISTRATIVE | Facility: HOSPITAL | Age: 80
End: 2020-01-01

## 2020-01-01 ENCOUNTER — APPOINTMENT (OUTPATIENT)
Dept: ONCOLOGY | Facility: HOSPITAL | Age: 80
End: 2020-01-01

## 2020-01-01 ENCOUNTER — HOSPITAL ENCOUNTER (OUTPATIENT)
Dept: RADIATION ONCOLOGY | Facility: HOSPITAL | Age: 80
Setting detail: RADIATION/ONCOLOGY SERIES
Discharge: HOME | End: 2020-12-29

## 2020-01-01 ENCOUNTER — TRANSCRIBE ORDERS (OUTPATIENT)
Dept: LAB | Facility: HOSPITAL | Age: 80
End: 2020-01-01

## 2020-01-01 ENCOUNTER — HOSPITAL ENCOUNTER (OUTPATIENT)
Dept: RADIATION ONCOLOGY | Facility: HOSPITAL | Age: 80
Discharge: HOME | End: 2020-12-10

## 2020-01-01 ENCOUNTER — OFFICE VISIT (OUTPATIENT)
Dept: GASTROENTEROLOGY | Facility: CLINIC | Age: 80
End: 2020-01-01

## 2020-01-01 ENCOUNTER — HOSPITAL ENCOUNTER (OUTPATIENT)
Dept: CT IMAGING | Facility: HOSPITAL | Age: 80
Discharge: HOME | End: 2020-12-07

## 2020-01-01 ENCOUNTER — HOSPITAL ENCOUNTER (OUTPATIENT)
Dept: CT IMAGING | Facility: HOSPITAL | Age: 80
End: 2020-01-01

## 2020-01-01 ENCOUNTER — HOSPITAL ENCOUNTER (OUTPATIENT)
Dept: RADIATION ONCOLOGY | Facility: HOSPITAL | Age: 80
Setting detail: RADIATION/ONCOLOGY SERIES
Discharge: HOME | End: 2020-12-28

## 2020-01-01 ENCOUNTER — OFFICE VISIT (OUTPATIENT)
Dept: OTOLARYNGOLOGY | Facility: CLINIC | Age: 80
End: 2020-01-01

## 2020-01-01 ENCOUNTER — APPOINTMENT (OUTPATIENT)
Dept: RADIATION ONCOLOGY | Facility: HOSPITAL | Age: 80
End: 2020-01-01

## 2020-01-01 ENCOUNTER — DOCUMENTATION (OUTPATIENT)
Dept: RADIATION ONCOLOGY | Facility: HOSPITAL | Age: 80
End: 2020-01-01

## 2020-01-01 ENCOUNTER — CONSULT (OUTPATIENT)
Dept: ONCOLOGY | Facility: CLINIC | Age: 80
End: 2020-01-01

## 2020-01-01 ENCOUNTER — OFFICE VISIT (OUTPATIENT)
Dept: ONCOLOGY | Facility: CLINIC | Age: 80
End: 2020-01-01

## 2020-01-01 ENCOUNTER — HOSPITAL ENCOUNTER (OUTPATIENT)
Dept: ULTRASOUND IMAGING | Facility: HOSPITAL | Age: 80
Discharge: HOME | End: 2020-11-06
Admitting: PEDIATRICS

## 2020-01-01 ENCOUNTER — HOSPITAL ENCOUNTER (OUTPATIENT)
Dept: RADIATION ONCOLOGY | Facility: HOSPITAL | Age: 80
Setting detail: RADIATION/ONCOLOGY SERIES
End: 2020-01-01

## 2020-01-01 ENCOUNTER — APPOINTMENT (OUTPATIENT)
Dept: LAB | Facility: HOSPITAL | Age: 80
End: 2020-01-01

## 2020-01-01 VITALS
WEIGHT: 184.7 LBS | OXYGEN SATURATION: 91 % | HEART RATE: 80 BPM | BODY MASS INDEX: 30.77 KG/M2 | DIASTOLIC BLOOD PRESSURE: 70 MMHG | RESPIRATION RATE: 16 BRPM | TEMPERATURE: 97.1 F | SYSTOLIC BLOOD PRESSURE: 142 MMHG | HEIGHT: 65 IN

## 2020-01-01 VITALS
DIASTOLIC BLOOD PRESSURE: 68 MMHG | HEART RATE: 67 BPM | BODY MASS INDEX: 30.94 KG/M2 | TEMPERATURE: 97.2 F | HEIGHT: 65 IN | OXYGEN SATURATION: 91 % | SYSTOLIC BLOOD PRESSURE: 132 MMHG | WEIGHT: 185.7 LBS | RESPIRATION RATE: 16 BRPM

## 2020-01-01 VITALS
DIASTOLIC BLOOD PRESSURE: 106 MMHG | WEIGHT: 182 LBS | SYSTOLIC BLOOD PRESSURE: 144 MMHG | HEIGHT: 65 IN | HEART RATE: 103 BPM | TEMPERATURE: 97.3 F | BODY MASS INDEX: 30.32 KG/M2

## 2020-01-01 VITALS
RESPIRATION RATE: 18 BRPM | SYSTOLIC BLOOD PRESSURE: 137 MMHG | OXYGEN SATURATION: 98 % | HEIGHT: 65 IN | TEMPERATURE: 98.2 F | DIASTOLIC BLOOD PRESSURE: 79 MMHG | BODY MASS INDEX: 30.32 KG/M2 | WEIGHT: 182 LBS | HEART RATE: 78 BPM

## 2020-01-01 VITALS
HEART RATE: 70 BPM | OXYGEN SATURATION: 94 % | DIASTOLIC BLOOD PRESSURE: 70 MMHG | BODY MASS INDEX: 30.32 KG/M2 | TEMPERATURE: 98 F | WEIGHT: 182 LBS | SYSTOLIC BLOOD PRESSURE: 140 MMHG | HEIGHT: 65 IN

## 2020-01-01 VITALS
BODY MASS INDEX: 29.99 KG/M2 | WEIGHT: 180 LBS | HEIGHT: 65 IN | OXYGEN SATURATION: 77 % | DIASTOLIC BLOOD PRESSURE: 76 MMHG | SYSTOLIC BLOOD PRESSURE: 142 MMHG

## 2020-01-01 DIAGNOSIS — C32.1 CANCER OF SUPRAGLOTTIS (HCC): Primary | ICD-10-CM

## 2020-01-01 DIAGNOSIS — C34.90 SQUAMOUS CELL CARCINOMA OF LUNG, UNSPECIFIED LATERALITY (HCC): Primary | ICD-10-CM

## 2020-01-01 DIAGNOSIS — M79.642 PAIN OF LEFT HAND: ICD-10-CM

## 2020-01-01 DIAGNOSIS — R94.6 ABNORMAL RESULTS OF THYROID FUNCTION STUDIES: ICD-10-CM

## 2020-01-01 DIAGNOSIS — C80.1 CANCER WITH UNKNOWN PRIMARY SITE (HCC): ICD-10-CM

## 2020-01-01 DIAGNOSIS — C77.9 METASTATIC SQUAMOUS CELL CARCINOMA TO LYMPH NODE (HCC): Primary | ICD-10-CM

## 2020-01-01 DIAGNOSIS — G89.3 CANCER RELATED PAIN: ICD-10-CM

## 2020-01-01 DIAGNOSIS — C34.90 SQUAMOUS CELL CARCINOMA OF LUNG, UNSPECIFIED LATERALITY (HCC): ICD-10-CM

## 2020-01-01 DIAGNOSIS — I10 ESSENTIAL HYPERTENSION: Primary | ICD-10-CM

## 2020-01-01 DIAGNOSIS — R22.1 NECK MASS: Primary | ICD-10-CM

## 2020-01-01 DIAGNOSIS — IMO0001 ALCOHOLISM /ALCOHOL ABUSE: ICD-10-CM

## 2020-01-01 DIAGNOSIS — R22.1 LOCALIZED SWELLING, MASS AND LUMP, NECK: ICD-10-CM

## 2020-01-01 DIAGNOSIS — M25.562 ACUTE PAIN OF BOTH KNEES: ICD-10-CM

## 2020-01-01 DIAGNOSIS — J44.1 COPD WITH ACUTE EXACERBATION (HCC): ICD-10-CM

## 2020-01-01 DIAGNOSIS — F17.200 TOBACCO DEPENDENCE SYNDROME: ICD-10-CM

## 2020-01-01 DIAGNOSIS — C32.1 CANCER OF SUPRAGLOTTIS (HCC): ICD-10-CM
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
## 2020-01-01 DIAGNOSIS — I50.9 HEART FAILURE, UNSPECIFIED HF CHRONICITY, UNSPECIFIED HEART FAILURE TYPE (HCC): ICD-10-CM

## 2020-01-01 DIAGNOSIS — I50.31 ACUTE DIASTOLIC HEART FAILURE (HCC): ICD-10-CM

## 2020-01-01 DIAGNOSIS — Z01.818 PRE-OP TESTING: Primary | ICD-10-CM

## 2020-01-01 DIAGNOSIS — M25.561 ACUTE PAIN OF BOTH KNEES: ICD-10-CM

## 2020-01-01 DIAGNOSIS — F17.200 CURRENT EVERY DAY SMOKER: ICD-10-CM

## 2020-01-01 DIAGNOSIS — E11.69 TYPE 2 DIABETES MELLITUS WITH OTHER SPECIFIED COMPLICATION, UNSPECIFIED WHETHER LONG TERM INSULIN USE (HCC): ICD-10-CM

## 2020-01-01 DIAGNOSIS — R91.8 OTHER NONSPECIFIC ABNORMAL FINDING OF LUNG FIELD: ICD-10-CM

## 2020-01-01 DIAGNOSIS — M54.2 NECK PAIN: ICD-10-CM

## 2020-01-01 DIAGNOSIS — Z99.81 DEPENDENCE ON CONTINUOUS SUPPLEMENTAL OXYGEN: ICD-10-CM

## 2020-01-01 DIAGNOSIS — J96.02 ACUTE RESPIRATORY FAILURE WITH HYPOXIA AND HYPERCAPNIA (HCC): ICD-10-CM

## 2020-01-01 DIAGNOSIS — R91.1 SOLITARY PULMONARY NODULE: Primary | ICD-10-CM

## 2020-01-01 DIAGNOSIS — R22.1 NECK MASS: ICD-10-CM

## 2020-01-01 DIAGNOSIS — R94.8 ABNORMAL GASTROINTESTINAL PET SCAN: Primary | ICD-10-CM

## 2020-01-01 DIAGNOSIS — I10 ESSENTIAL HYPERTENSION: ICD-10-CM

## 2020-01-01 DIAGNOSIS — J96.01 ACUTE RESPIRATORY FAILURE WITH HYPOXIA AND HYPERCAPNIA (HCC): ICD-10-CM

## 2020-01-01 LAB
ALBUMIN SERPL-MCNC: 3.8 G/DL (ref 3.5–5.2)
ALBUMIN SERPL-MCNC: 4.1 G/DL (ref 3.5–5)
ALBUMIN/GLOB SERPL: 1.1 G/DL (ref 1.1–2.5)
ALBUMIN/GLOB SERPL: 1.2 G/DL
ALP SERPL-CCNC: 107 U/L (ref 24–120)
ALP SERPL-CCNC: 139 U/L (ref 39–117)
ALT SERPL W P-5'-P-CCNC: 10 U/L (ref 1–41)
ALT SERPL W P-5'-P-CCNC: 13 U/L (ref 0–50)
ANION GAP SERPL CALCULATED.3IONS-SCNC: 3 MMOL/L (ref 4–13)
ANION GAP SERPL CALCULATED.3IONS-SCNC: 9 MMOL/L (ref 5–15)
AST SERPL-CCNC: 16 U/L (ref 1–40)
AST SERPL-CCNC: 23 U/L (ref 7–45)
AUTO MIXED CELLS #: 0.7 10*3/MM3 (ref 0.1–2.6)
AUTO MIXED CELLS %: 9.3 % (ref 0.1–24)
BASOPHILS # BLD AUTO: 0.03 10*3/MM3 (ref 0–0.2)
BASOPHILS NFR BLD AUTO: 0.4 % (ref 0–1.5)
BILIRUB SERPL-MCNC: 0.5 MG/DL (ref 0–1.2)
BILIRUB SERPL-MCNC: 0.8 MG/DL (ref 0.1–1)
BILIRUB UR QL STRIP: NEGATIVE
BUN SERPL-MCNC: 11 MG/DL (ref 8–23)
BUN SERPL-MCNC: 20 MG/DL (ref 5–21)
BUN/CREAT SERPL: 10 (ref 7–25)
BUN/CREAT SERPL: 13.2
CALCIUM SPEC-SCNC: 9.2 MG/DL (ref 8.6–10.5)
CALCIUM SPEC-SCNC: 9.6 MG/DL (ref 8.4–10.4)
CEA SERPL-MCNC: 5.48 NG/ML
CHLORIDE SERPL-SCNC: 102 MMOL/L (ref 98–110)
CHLORIDE SERPL-SCNC: 107 MMOL/L (ref 98–107)
CHOLEST SERPL-MCNC: 158 MG/DL (ref 130–200)
CLARITY UR: CLEAR
CO2 SERPL-SCNC: 28 MMOL/L (ref 22–29)
CO2 SERPL-SCNC: 32 MMOL/L (ref 24–31)
COLOR UR: YELLOW
COVID LABCORP PRIORITY: NORMAL
CREAT SERPL-MCNC: 1.1 MG/DL (ref 0.76–1.27)
CREAT SERPL-MCNC: 1.52 MG/DL (ref 0.5–1.4)
CYTO UR: NORMAL
DEPRECATED RDW RBC AUTO: 54.7 FL (ref 37–54)
EOSINOPHIL # BLD AUTO: 0.25 10*3/MM3 (ref 0–0.4)
EOSINOPHIL NFR BLD AUTO: 3.4 % (ref 0.3–6.2)
ERYTHROCYTE [DISTWIDTH] IN BLOOD BY AUTOMATED COUNT: 16.1 % (ref 12.3–15.4)
ERYTHROCYTE [DISTWIDTH] IN BLOOD BY AUTOMATED COUNT: 16.2 % (ref 12.3–15.4)
FERRITIN SERPL-MCNC: 58.51 NG/ML (ref 30–400)
GFR SERPL CREATININE-BSD FRML MDRD: 45 ML/MIN/1.73
GFR SERPL CREATININE-BSD FRML MDRD: 65 ML/MIN/1.73
GLOBULIN UR ELPH-MCNC: 3.3 GM/DL
GLOBULIN UR ELPH-MCNC: 3.6 GM/DL
GLUCOSE SERPL-MCNC: 111 MG/DL (ref 65–99)
GLUCOSE SERPL-MCNC: 141 MG/DL (ref 70–100)
GLUCOSE UR STRIP-MCNC: NEGATIVE MG/DL
HBA1C MFR BLD: 6.3 % (ref 4.8–5.9)
HCT VFR BLD AUTO: 48.7 % (ref 37.5–51)
HCT VFR BLD AUTO: 48.8 % (ref 37.5–51)
HDLC SERPL-MCNC: 31 MG/DL
HGB BLD-MCNC: 14.9 G/DL (ref 13–17.7)
HGB BLD-MCNC: 15.2 G/DL (ref 13–17.7)
HGB UR QL STRIP.AUTO: NEGATIVE
IMM GRANULOCYTES # BLD AUTO: 0.02 10*3/MM3 (ref 0–0.05)
IMM GRANULOCYTES NFR BLD AUTO: 0.3 % (ref 0–0.5)
IRON 24H UR-MRATE: 49 MCG/DL (ref 59–158)
IRON SATN MFR SERPL: 11 % (ref 20–50)
KETONES UR QL STRIP: NEGATIVE
LAB AP CASE REPORT: NORMAL
LAB AP DIAGNOSIS COMMENT: NORMAL
LDLC SERPL CALC-MCNC: 78 MG/DL (ref 0–99)
LDLC/HDLC SERPL: 2.15 {RATIO}
LEUKOCYTE ESTERASE UR QL STRIP.AUTO: NEGATIVE
LYMPHOCYTES # BLD AUTO: 1.27 10*3/MM3 (ref 0.7–3.1)
LYMPHOCYTES # BLD AUTO: 1.8 10*3/MM3 (ref 0.7–3.1)
LYMPHOCYTES NFR BLD AUTO: 17.4 % (ref 19.6–45.3)
LYMPHOCYTES NFR BLD AUTO: 23.1 % (ref 19.6–45.3)
Lab: NORMAL
MCH RBC QN AUTO: 28.3 PG (ref 26.6–33)
MCH RBC QN AUTO: 28.7 PG (ref 26.6–33)
MCHC RBC AUTO-ENTMCNC: 30.5 G/DL (ref 31.5–35.7)
MCHC RBC AUTO-ENTMCNC: 31.2 G/DL (ref 31.5–35.7)
MCV RBC AUTO: 91.9 FL (ref 79–97)
MCV RBC AUTO: 92.8 FL (ref 79–97)
MONOCYTES # BLD AUTO: 0.75 10*3/MM3 (ref 0.1–0.9)
MONOCYTES NFR BLD AUTO: 10.3 % (ref 5–12)
NEUTROPHILS NFR BLD AUTO: 4.98 10*3/MM3 (ref 1.7–7)
NEUTROPHILS NFR BLD AUTO: 5.5 10*3/MM3 (ref 1.7–7)
NEUTROPHILS NFR BLD AUTO: 67.6 % (ref 42.7–76)
NEUTROPHILS NFR BLD AUTO: 68.2 % (ref 42.7–76)
NITRITE UR QL STRIP: NEGATIVE
NRBC BLD AUTO-RTO: 0 /100 WBC (ref 0–0.2)
NT-PROBNP SERPL-MCNC: 220.2 PG/ML (ref 0–1800)
PATH REPORT.FINAL DX SPEC: NORMAL
PATH REPORT.GROSS SPEC: NORMAL
PH UR STRIP.AUTO: 7 [PH] (ref 5–8)
PLATELET # BLD AUTO: 261 10*3/MM3 (ref 140–450)
PLATELET # BLD AUTO: 286 10*3/MM3 (ref 140–450)
PMV BLD AUTO: 8.2 FL (ref 6–12)
PMV BLD AUTO: 9.3 FL (ref 6–12)
POTASSIUM SERPL-SCNC: 4.4 MMOL/L (ref 3.5–5.2)
POTASSIUM SERPL-SCNC: 4.6 MMOL/L (ref 3.5–5.3)
PROT SERPL-MCNC: 7.1 G/DL (ref 6–8.5)
PROT SERPL-MCNC: 7.7 G/DL (ref 6.3–8.7)
PROT UR QL STRIP: NEGATIVE
RBC # BLD AUTO: 5.26 10*6/MM3 (ref 4.14–5.8)
RBC # BLD AUTO: 5.3 10*6/MM3 (ref 4.14–5.8)
SARS-COV-2 RNA RESP QL NAA+PROBE: NOT DETECTED
SODIUM SERPL-SCNC: 137 MMOL/L (ref 135–145)
SODIUM SERPL-SCNC: 144 MMOL/L (ref 136–145)
SP GR UR STRIP: 1.01 (ref 1–1.03)
TIBC SERPL-MCNC: 446 MCG/DL (ref 298–536)
TRANSFERRIN SERPL-MCNC: 299 MG/DL (ref 200–360)
TRIGL SERPL-MCNC: 301 MG/DL (ref 0–149)
TSH SERPL DL<=0.05 MIU/L-ACNC: 1.25 UIU/ML (ref 0.27–4.2)
UROBILINOGEN UR QL STRIP: NORMAL
VLDLC SERPL-MCNC: 49 MG/DL (ref 5–40)
WBC # BLD AUTO: 7.3 10*3/MM3 (ref 3.4–10.8)
WBC # BLD AUTO: 8 10*3/MM3 (ref 3.4–10.8)

## 2020-01-01 PROCEDURE — 93005 ELECTROCARDIOGRAM TRACING: CPT | Performed by: NURSE PRACTITIONER

## 2020-01-01 PROCEDURE — 99204 OFFICE O/P NEW MOD 45 MIN: CPT | Performed by: NURSE PRACTITIONER

## 2020-01-01 PROCEDURE — 76942 ECHO GUIDE FOR BIOPSY: CPT

## 2020-01-01 PROCEDURE — 73562 X-RAY EXAM OF KNEE 3: CPT

## 2020-01-01 PROCEDURE — 77300 RADIATION THERAPY DOSE PLAN: CPT | Performed by: RADIOLOGY

## 2020-01-01 PROCEDURE — 73130 X-RAY EXAM OF HAND: CPT

## 2020-01-01 PROCEDURE — 83880 ASSAY OF NATRIURETIC PEPTIDE: CPT | Performed by: NURSE PRACTITIONER

## 2020-01-01 PROCEDURE — 72128 CT CHEST SPINE W/O DYE: CPT

## 2020-01-01 PROCEDURE — A9552 F18 FDG: HCPCS | Performed by: INTERNAL MEDICINE

## 2020-01-01 PROCEDURE — 36415 COLL VENOUS BLD VENIPUNCTURE: CPT | Performed by: NURSE PRACTITIONER

## 2020-01-01 PROCEDURE — 76536 US EXAM OF HEAD AND NECK: CPT

## 2020-01-01 PROCEDURE — 85025 COMPLETE CBC W/AUTO DIFF WBC: CPT

## 2020-01-01 PROCEDURE — 72131 CT LUMBAR SPINE W/O DYE: CPT

## 2020-01-01 PROCEDURE — 88333 PATH CONSLTJ SURG CYTO XM 1: CPT | Performed by: PEDIATRICS

## 2020-01-01 PROCEDURE — 88305 TISSUE EXAM BY PATHOLOGIST: CPT | Performed by: PEDIATRICS

## 2020-01-01 PROCEDURE — 80061 LIPID PANEL: CPT

## 2020-01-01 PROCEDURE — 88341 IMHCHEM/IMCYTCHM EA ADD ANTB: CPT | Performed by: PEDIATRICS

## 2020-01-01 PROCEDURE — 82378 CARCINOEMBRYONIC ANTIGEN: CPT

## 2020-01-01 PROCEDURE — 77290 THER RAD SIMULAJ FIELD CPLX: CPT | Performed by: RADIOLOGY

## 2020-01-01 PROCEDURE — 77417 THER RADIOLOGY PORT IMAGE(S): CPT | Performed by: RADIOLOGY

## 2020-01-01 PROCEDURE — 80053 COMPREHEN METABOLIC PANEL: CPT | Performed by: NURSE PRACTITIONER

## 2020-01-01 PROCEDURE — 31575 DIAGNOSTIC LARYNGOSCOPY: CPT | Performed by: OTOLARYNGOLOGY

## 2020-01-01 PROCEDURE — 36415 COLL VENOUS BLD VENIPUNCTURE: CPT

## 2020-01-01 PROCEDURE — 88342 IMHCHEM/IMCYTCHM 1ST ANTB: CPT | Performed by: PEDIATRICS

## 2020-01-01 PROCEDURE — 99283 EMERGENCY DEPT VISIT LOW MDM: CPT

## 2020-01-01 PROCEDURE — 83036 HEMOGLOBIN GLYCOSYLATED A1C: CPT | Performed by: NURSE PRACTITIONER

## 2020-01-01 PROCEDURE — 77412 RADIATION TX DELIVERY LVL 3: CPT | Performed by: RADIOLOGY

## 2020-01-01 PROCEDURE — 82728 ASSAY OF FERRITIN: CPT

## 2020-01-01 PROCEDURE — C9803 HOPD COVID-19 SPEC COLLECT: HCPCS | Performed by: OTOLARYNGOLOGY

## 2020-01-01 PROCEDURE — 70490 CT SOFT TISSUE NECK W/O DYE: CPT

## 2020-01-01 PROCEDURE — 77280 THER RAD SIMULAJ FIELD SMPL: CPT | Performed by: RADIOLOGY

## 2020-01-01 PROCEDURE — 99205 OFFICE O/P NEW HI 60 MIN: CPT | Performed by: INTERNAL MEDICINE

## 2020-01-01 PROCEDURE — U0003 INFECTIOUS AGENT DETECTION BY NUCLEIC ACID (DNA OR RNA); SEVERE ACUTE RESPIRATORY SYNDROME CORONAVIRUS 2 (SARS-COV-2) (CORONAVIRUS DISEASE [COVID-19]), AMPLIFIED PROBE TECHNIQUE, MAKING USE OF HIGH THROUGHPUT TECHNOLOGIES AS DESCRIBED BY CMS-2020-01-R: HCPCS | Performed by: OTOLARYNGOLOGY

## 2020-01-01 PROCEDURE — 77295 3-D RADIOTHERAPY PLAN: CPT | Performed by: RADIOLOGY

## 2020-01-01 PROCEDURE — 99215 OFFICE O/P EST HI 40 MIN: CPT | Performed by: INTERNAL MEDICINE

## 2020-01-01 PROCEDURE — 84466 ASSAY OF TRANSFERRIN: CPT

## 2020-01-01 PROCEDURE — 93010 ELECTROCARDIOGRAM REPORT: CPT | Performed by: INTERNAL MEDICINE

## 2020-01-01 PROCEDURE — 71045 X-RAY EXAM CHEST 1 VIEW: CPT

## 2020-01-01 PROCEDURE — 99203 OFFICE O/P NEW LOW 30 MIN: CPT | Performed by: OTOLARYNGOLOGY

## 2020-01-01 PROCEDURE — 78815 PET IMAGE W/CT SKULL-THIGH: CPT

## 2020-01-01 PROCEDURE — 71250 CT THORAX DX C-: CPT

## 2020-01-01 PROCEDURE — 0 FLUDEOXYGLUCOSE F18 SOLUTION: Performed by: INTERNAL MEDICINE

## 2020-01-01 PROCEDURE — 81003 URINALYSIS AUTO W/O SCOPE: CPT | Performed by: NURSE PRACTITIONER

## 2020-01-01 PROCEDURE — 80053 COMPREHEN METABOLIC PANEL: CPT

## 2020-01-01 PROCEDURE — 83540 ASSAY OF IRON: CPT

## 2020-01-01 PROCEDURE — 84443 ASSAY THYROID STIM HORMONE: CPT | Performed by: NURSE PRACTITIONER

## 2020-01-01 PROCEDURE — 77334 RADIATION TREATMENT AID(S): CPT | Performed by: RADIOLOGY

## 2020-01-01 PROCEDURE — 72125 CT NECK SPINE W/O DYE: CPT

## 2020-01-01 RX ORDER — SODIUM CHLORIDE 0.9 % (FLUSH) 0.9 %
10 SYRINGE (ML) INJECTION AS NEEDED
Status: DISCONTINUED | OUTPATIENT
Start: 2020-01-01 | End: 2020-01-01 | Stop reason: HOSPADM

## 2020-01-01 RX ORDER — ONDANSETRON 4 MG/1
4 TABLET, FILM COATED ORAL EVERY 8 HOURS PRN
Qty: 30 TABLET | Refills: 0 | Status: SHIPPED | OUTPATIENT
Start: 2020-01-01

## 2020-01-01 RX ORDER — HYDROCODONE BITARTRATE AND ACETAMINOPHEN 5; 325 MG/1; MG/1
1 TABLET ORAL EVERY 6 HOURS PRN
Qty: 40 TABLET | Refills: 0 | Status: SHIPPED | OUTPATIENT
Start: 2020-01-01 | End: 2021-01-01 | Stop reason: SDUPTHER

## 2020-01-01 RX ORDER — POTASSIUM CHLORIDE 1500 MG/1
TABLET, FILM COATED, EXTENDED RELEASE ORAL
COMMUNITY
Start: 2020-01-01

## 2020-01-01 RX ORDER — TRAMADOL HYDROCHLORIDE 50 MG/1
1 TABLET ORAL EVERY 6 HOURS PRN
COMMUNITY
Start: 2020-01-01 | End: 2020-01-01

## 2020-01-01 RX ORDER — HYDROCHLOROTHIAZIDE 12.5 MG/1
12.5 TABLET ORAL DAILY
COMMUNITY
Start: 2020-01-01

## 2020-01-01 RX ORDER — ALBUTEROL SULFATE 90 UG/1
AEROSOL, METERED RESPIRATORY (INHALATION)
COMMUNITY
Start: 2020-01-01

## 2020-01-01 RX ORDER — METOPROLOL SUCCINATE 25 MG/1
25 TABLET, EXTENDED RELEASE ORAL DAILY
COMMUNITY
Start: 2020-01-01

## 2020-01-01 RX ORDER — CEPHALEXIN 500 MG/1
CAPSULE ORAL
COMMUNITY
Start: 2020-01-01 | End: 2020-01-01

## 2020-01-01 RX ADMIN — FLUDEOXYGLUCOSE F18 1 DOSE: 300 INJECTION INTRAVENOUS at 11:23

## 2020-11-16 NOTE — TELEPHONE ENCOUNTER
Spoke with [facility] and with pt. Have sched appt 12/1/20. Pt stated he was experiencing increased pain and difficulty breathing in the neck mass area. I instructed him to contact his PCP for them to instruct him. He v/u.

## 2020-11-16 NOTE — TELEPHONE ENCOUNTER
PATIENT IS STILL WAITING FOR A CALL FOR A NEW PT APPT, HIS REFERRAL IS SCHEDULING REVIEW, WANTS TO KNOW WHEN HE WILL BE SCHEDULED FOR, PLEASE ADVISE?    PT CALL BACK #[phone]

## 2020-12-01 PROBLEM — F17.200 TOBACCO DEPENDENCE SYNDROME: Status: ACTIVE | Noted: 2019-10-30

## 2020-12-01 PROBLEM — E66.9 OBESITY: Status: ACTIVE | Noted: 2019-10-30

## 2020-12-01 PROBLEM — C34.90 SQUAMOUS CELL LUNG CANCER (HCC): Status: ACTIVE | Noted: 2020-01-01

## 2020-12-01 NOTE — TELEPHONE ENCOUNTER
Rodrigo's daughter Jose Alejandro is asking for a call from a nurse to discuss pt's diagnosis, treatments, and plan. She says Rodrigo is hard of hearing so he may not answer his phone if we call.     Jose Alejandro's Phone# [phone]

## 2020-12-08 PROBLEM — F17.200 CURRENT EVERY DAY SMOKER: Status: ACTIVE | Noted: 2020-01-01

## 2020-12-08 NOTE — PROGRESS NOTES
[facility]CMD Tita Luong BSN, PA-C  ____________________________________________________________               [facility]  Department of Radiation Oncology  [address] [place] [id number]  Office:  [phone]  Fax: [phone]    DATE:  12/13/2020  PATIENT: Rodrigo Javed Jr. 1940                                 MEDICAL RECORD #: [id number]                                                       REASON FOR CONSULTATION: Rodrigo Javed Jr. is a very pleasant male that has been referred to our office for radiotherapy considerations for right neck mass. Denies activity change, unexpected weight change, nausea/vomiting, diarrhea, light-headedness, weakness, and headaches. He follows .           HISTORY OF PRESENT ILLNESS  Stage IV (cT2,cNx, M1) Squamous cell carcinoma, metastatic right neck mass, 6.3 cm. PET scan revealed hypermetabolic activity in scattered bilateral cervical and right supraclavicular nodes, focal  uptake at the left epiglottis without CT correlate SUV 5.4. RUL nodule 3.1cm SUV 9.6. COSMO 1 cm  nodule SUV 3.4, LLL 1.2 cm nodule SUV 5.0, Pretracheal lymph node 1.4 cm SUV 4.2, a few smaller FDG avid bilateral hilar and mediastinal lymph nodes. Follows Dr. Mcqueen who has ordered further testing on tissue sample to determine primary site.    11/16/2019 - CT Angiogram chest with and without contrast:  · No evidence of embolic disease.  · Infiltrate or atelectasis left lower lobe.  · Small bilateral pleural effusions are noted.  · 7 mm nodule left upper lobe. 6 month follow-up is suggested.    10/30/2020 - CT Chest without contrast:  · Multiple new and enlarging pulmonary nodules are present.  · These include the following nodules.   · Enlarging 2.7 cm RIGHT upper lobe pulmonary nodule with chest wall and mediastinal abutment.   · New 1 cm LEFT lower lobe superior segment pulmonary nodule on image 60.   · New 6 mm  RIGHT inferior upper lobe pulmonary nodule on image 69.   · New 5 mm RIGHT lower lobe pulmonary nodule on image 102.   · New 7 mm LEFT upper lobe pulmonary nodule on image 53.  · Multiple indeterminate low-density liver lesions, potentially cysts.   · Mild chronic thickening of the bilateral adrenal glands.   · No suspicious focal bone lesion.  Impression:  · Multiple new and enlarging pulmonary nodules measuring up to 2.7 cm. Findings are highly suspicious for pulmonary metastasis  Addendum:   · Given that the dominant pulmonary nodule in the RIGHT upper lobe has increased in size from prior exams, this nodule is highly suspicious for primary lung neoplasm with the other small nodules representing pulmonary metastasis.     10/30/2020 - CT Soft Tissue neck without contrast:  · A skin marker overlies a right-sided neck mass.  · On the right side of the neck at the level of the hyoid bone is a large lobular centrally necrotic soft tissue mass which measures 50 x 48 x 44 mm.  · Given that there is also a 15 mm nodule within the medial right upper lobe this likely represents metastatic lymphadenopathy within the neck.  · The right submandibular gland is displaced anteriorly by the soft tissue mass.  · Tubular superficial subcutaneous structure in the left supraclavicular region may represent an additional lymph node or an enlarged vein.   Impression:  · Large centrally necrotic soft tissue mass along the right side of the neck represents the palpable abnormality.  · Metastatic lymphadenopathy is suspected. Ultrasound-guided right neck mass biopsy and be performed for tissue diagnosis.    11/06/2020 - Ultrasound-guided biopsy of right neck mass (suspected lymph node metastasis):  · Right neck mass, core biopsy:  · Squamous cell carcinoma.  · Right neck mass, fine-needle aspiration:  · Squamous cell carcinoma.  Microscopic description: Immunohistochemical stains for TTF-1-1 and CK 5/6 performed.  Tumor cells stain  "strongly and diffusely positive for CK 5/6 and negative for TTF-1-1.    12/01/2020 - Appointment with ion:  Assessment:  · Squamous cell carcinoma, metastatic.  Probable lung primary  · Original tumor stage: IV (cT2,cNx, M1)  · Tumor burden:  · 10/30/2020-chest CT (comparison 11/16/2019) for follow-up of solitary pulmonary nodule revealed multiple new and enlarging pulmonary nodules (measuring up to 2.7 cm right upper lobe pulmonary nodule with chest wall and mediastinal abutment; new 1 cm left lower lobe superior segment pulmonary nodule, new 6 mm right inferior upper lobe pulmonary nodule, new 5 mm right lower lobe pulmonary nodule, new 7 mm left upper lobe pulmonary nodule) highly suspicious for pulmonary metastasis.  No enlarged axillary/supraclavicular/mediastinal/hilar lymphadenopathy.  Multiple indeterminate low-density liver lesions, potentially cysts.  · 10/30/2020-CT soft tissue neck without contrast-large lobular centrally necrotic soft tissue mass measuring 50 x 48 x 44 mm along the right side of the neck.  Metastatic lymphadenopathy suspected.  · Tumor status:  Untreated  · COPD.  On home O2  · Tobacco abuse.  Still smoking 1.5 ppd  · Alcohol abuse.  Still drinks sultana, \"occasionally.\"  · History of diastolic CHF  Plan:  · Apprised of the available information, to include the radiographic findings, and core biopsy diagnosis indicating squamous cell/poorly differentiated carcinoma.  · CT scans from 10/30/2020 reviewed with Dr. Marie of radiology.  Agrees that the 7 mm right upper lobe lung nodule from the CT angiogram last 11/21/2019 corresponds with the 2.7 cm right upper lobe pulmonary nodule seen on most recent CT of the chest, 10/30/2020, indicating a possible primary site.  · Pathology from the core needle biopsy taken from the neck mass on 11/06/2020 discussed with Dr. Steve of pathology.  Unfortunately, they are unable to tell me from that specimen where the primary site " is.  · Appoint to Dr. Norris of ENT Re: Assess for panendoscopic exam to look for head and neck primary.  · Send biopsy specimen for PD-L1 immunostaining.  · [facility].  Compared to CT of the chest, 10/30/2020.  · Draw baseline CBC with differential, CMP, CEA, iron, iron saturation, ferritin.  · Appoint to Dr. Wing Re:  Mediport placement  · Appoint to Dr. Dial Re:  Palliative radiation to the right neck mass  · Return to office in 2 weeks for further recommendations.    12/10/2020 - PET Scan:  · Right neck with a 6.3 cm peripherally FDG avid mass, maximum SUV 18.1.   · Central low-density suggesting central necrosis.   · There are a few smaller scattered bilateral cervical and right supraclavicular lymph nodes that have mild FDG uptake.  · There is subtle focal FDG uptake at the left epiglottis, which does not definitely have a CT correlate. Maximum SUV 5.4. Recommend direct visualization.  · Right upper lobe with a 3.1 x 1.9 cm FDG avid nodule. Maximum SUV 9.6.  · Left upper lobe 1 cm pulmonary nodule with maximum SUV 3.4.  · Superior segment of the left lower lobe with a 1.2 cm nodule, maximum SUV 5.0.  · Pretracheal lymph node measures up to 1.4 cm. Maximum SUV 4.2.   · There are a few smaller FDG avid bilateral hilar and mediastinal lymph nodes.  · There are 2 tiny hypodense liver lesions, not optimally evaluated on this exam without intravenous contrast, which do not definitely appear to have FDG uptake.  · 1 cm periportal lymph node with maximum SUV 2.2.  · Infrarenal abdominal aorta measures up to 4.3 cm.    Impression:  · Dominant 6 cm right neck mass with maximum SUV 18.1. Central low density suggests necrosis.  · Subtle focal uptake at the left upper quadrant without definite CT correlate. Recommend direct visualization.  · There are 3 FDG avid pulmonary nodules, largest at the right upper lobe, these could represent primary pulmonary malignancy or metastatic disease.  · FDG  uptake in lymph nodes at the bilateral neck, right supraclavicular fossa, mediastinum, bilateral herrera and periportal regions. Some of the lymph nodes are enlarged.  · Mild FDG uptake at the rectum, which may be infectious/inflammatory. However, recommend correlation with physical exam and direct visualization if not recently performed.  · There are 2 hypodense liver lesions, do not appear to have FDG uptake, may represent hepatic cysts, better not optimally evaluated on this exam.  · Abdominal aortic aneurysm measuring 4.3 cm.  · Findings of the bilateral femoral heads suggesting avascular necrosis.            History obtained from  PATIENT, FAMILY, and CHART    PAST MEDICAL HISTORY  Past Medical History:   Diagnosis Date   • Cataract    • Hypertension       PAST SURGICAL HISTORY  Past Surgical History:   Procedure Laterality Date   • COLONOSCOPY     • HEMORRHOIDECTOMY        FAMILY HISTORY  family history includes Cancer in his father; No Known Problems in his mother.     SOCIAL HISTORY  Social History     Tobacco Use   • Smoking status: Current Every Day Smoker     Packs/day: 1.50     Years: 55.00     Pack years: 82.50   • Smokeless tobacco: Never Used   Substance Use Topics   • Alcohol use: Yes     Alcohol/week: 14.0 standard drinks     Types: 14 Cans of beer per week     Frequency: Never   • Drug use: No      ALLERGIES  Tetracycline     MEDICATIONS  Current Outpatient Medications   Medication Sig Dispense Refill   • albuterol sulfate  (90 Base) MCG/ACT inhaler INHALE 2 PUFFS BY MOUTH EVERY 4 HOURS     • aluminum-magnesium hydroxide-simethicone (MAALOX MAX) 400-400-40 MG/5ML suspension Take 15 mL by mouth Every 6 (Six) Hours As Needed for Indigestion or Heartburn.     • bisacodyl (DULCOLAX) 5 MG EC tablet Take 2 tablets by mouth Daily As Needed for Constipation.     • Breo Ellipta 100-25 MCG/INH inhaler      • ferrous sulfate (FERROUSUL) 325 (65 FE) MG tablet Take 1 tablet by mouth Daily With Breakfast.      • furosemide (LASIX) 40 MG tablet Take 1 tablet by mouth Daily.     • guaiFENesin (MUCINEX) 600 MG 12 hr tablet Take 2 tablets by mouth Every 12 (Twelve) Hours.     • hydroCHLOROthiazide (HYDRODIURIL) 12.5 MG tablet Take 12.5 mg by mouth Daily.     • hydrocortisone (ANUSOL-HC) 25 MG suppository Insert 1 suppository into the rectum 2 (Two) Times a Day.     • hydrOXYzine (ATARAX) 50 MG tablet Take 1 tablet by mouth At Night As Needed for Anxiety.     • ipratropium-albuterol (DUO-NEB) 0.5-2.5 mg/3 ml nebulizer Take 3 mL by nebulization Every 6 (Six) Hours. 360 mL    • lisinopril (PRINIVIL,ZESTRIL) 10 MG tablet Take 1 tablet by mouth Daily.     • metoprolol succinate XL (TOPROL-XL) 25 MG 24 hr tablet Take 25 mg by mouth Daily.     • nicotine (NICODERM CQ) 21 MG/24HR patch Place 1 patch on the skin as directed by provider Every Night.     • potassium chloride ER (K-TAB) 20 MEQ tablet controlled-release ER tablet      • ondansetron (ZOFRAN) 4 MG tablet Take 1 tablet by mouth Every 6 (Six) Hours As Needed for Nausea or Vomiting.       No current facility-administered medications for this visit.       Current outpatient and discharge medications have been reconciled for the patient.  Reviewed by: Corey Dial III, MD    The following portions of the patient's history were reviewed and updated as appropriate: allergies, current medications, past family history, past medical history, past social history, past surgical history and problem list.    REVIEW OF SYSTEMS  Review of Systems   Constitutional: Positive for fatigue. Negative for appetite change and unexpected weight change.   HENT: Positive for trouble swallowing.    Eyes: Negative.  Negative for visual disturbance.        Glasses   Respiratory: Positive for cough and shortness of breath (with minimal exertion).         02@5L     Cardiovascular: Negative.    Gastrointestinal: Positive for anal bleeding (patient states he has hemorrhoids) and constipation. Negative  "for nausea and vomiting.   Endocrine: Negative.    Genitourinary: Negative.    Musculoskeletal: Positive for gait problem (r/t shortness of breath) and neck pain (r/t right neck mass).   Skin: Negative.    Allergic/Immunologic: Negative.    Neurological: Positive for speech difficulty (patient states he feels like he is \"thick tongued\"), weakness and headaches (radiating from right neck mass). Negative for dizziness and light-headedness.   Hematological: Positive for adenopathy (large mass noted to right neck). Bruises/bleeds easily.   Psychiatric/Behavioral: Negative.      PHYSICAL EXAM  VITAL SIGNS:   Vitals:    12/10/20 1454   BP: 142/76   SpO2: (!) 77%  Comment: room air;patient states he is on 02@5L, on & up to 94%   Weight: 81.6 kg (180 lb)   Height: 165.1 cm (65\")   PainSc:   4   PainLoc: Neck  Comment: \"right neck up\"     General:  Alert and oriented, no acute distress, well developed, Vitals reviewed.  Head:  Normocephalic, without obvious abnormality    Nose/Sinuses:  Nares normal externally, no sinus tenderness.  Mouth/Throat:  Mucosa moist, pharynx without erythema  Neck:  supple, No evidence of adenopathy in the cervical or supraclavicular areas.  Eyes: No gross abnormalities   Ears: Ears intact with no external abnormalities noted  Chest:  Respiratory efforts are normal and unlabored, chest is clear to auscultation.  Cardiovascular: Regular rate and rhythm without murmurs, rubs, or gallops.   Abdomen:  Soft, non-tender, normal bowel sounds; no CVA tenderness   Extremities:  AVERY well, warm to touch, no evidence of cyanosis or edema.  Skin: No suspicious lesions or rashes of concern  Neurologic:  Alert and oriented, non focal exam, strength and sensation grossly normal  Psych: Mood and affect are appropriate    Performance Status: ECOG (0) Fully active, able to carry on all predisease performance without restriction    Clinical Quality Measures  -Pain Documented by Standardized Tool, HOA Javed "  reports a pain score of 0. Given his pain assessment as noted, treatment options were discussed and the following options were decided upon as a follow-up plan to address the patient's pain: No pain, no plan given.     -Advanced Care Planning   Advance Care Planning   ACP discussion was held with the patient during this visit. Patient does not have an advance directive, information provided.    -Body Mass Index Screening and Follow-Up Plan Patient's Body mass index is 29.95 kg/m². BMI is above normal parameters. Recommendations include: educational material.  -Tobacco Use: Screening and Cessation Intervention Social History    Tobacco Use      Smoking status: Current Every Day Smoker        Packs/day: 1.50        Years: 55.00        Pack years: 82.5      Smokeless tobacco: Never Used   Smoking cessation information given in after visit summary.    ASSESSMENT AND PLAN  1. 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 Cancer with unknown primary site (CMS/HCC)    3. Cancer related pain    4. Current every day smoker    5. Dependence on continuous supplemental oxygen      RECOMMENDATIONS: Rodrigo Javed Jr. was diagnosed with Stage IV (cT2,cNx, M1) Squamous cell carcinoma, metastatic right neck mass, 6.3 cm. PET scan revealed hypermetabolic activity in scattered bilateral cervical and right supraclavicular nodes, focal  uptake at the left epiglottis without CT correlate SUV 5.4. RUL nodule 3.1cm SUV 9.6. COSMO 1 cm  nodule SUV 3.4, LLL 1.2 cm nodule SUV 5.0, Pretracheal lymph node 1.4 cm SUV 4.2, a few smaller FDG avid bilateral hilar and mediastinal lymph nodes. Follows Dr. Mcqueen who has ordered further testing on tissue sample to determine primary site and palliative radiation therapy to right neck mass.    We have discussed the indications and rationale of radiation therapy according to the NCCN Guidelines. I have extensively reviewed the risks, benefits and alternatives of therapy and  progression of disease in spite of therapy with either local or systemic failure.  I have seen, examined and reviewed his medication list, appropriate labs and imaging studies as well as other physician notes. We discussed the goals/plans of care and answered all questions.     After careful consideration of the diagnostic data and evaluation of the patient, I am recommending to treat the right neck mass with palliative radiation for pain management and to maximize local tumor control. We discussed the PET scan findings which he had completed just prior to our visit today. Dr Fuller will discuss a final plan with him.     His initial o2 level on arrival was 77%, he states he ran out of oxygen since hes been here in town, we were able to call and get him new tanks delivered here during his appt. Saturation level came up to 94.    The patient verbalizes understanding of this discussion, voice no further questions and wish to proceed with recommendations.  We will simulate treatment fields today to begin the treatment planning, I anticipate a dose of 3250 cGy over 13 fractions, final course to be determined.    Rodrigo Javed Jr.  reports that he has been smoking. He has a 82.50 pack-year smoking history. He has never used smokeless tobacco.. I have educated him on the risk of diseases from using tobacco products such as cancer, COPD and heart disease. I advised him to quit and he is not willing to quit. I spent >10 minutes counseling the patient.    Thank you for allowing me to assist in this patients care.     Todays appointment time was spent in counseling and coordination of care as follows: diagnosis, intent of treatment discussing radiation therapy specifics: logistics, possible and probable side effects and after effects, staging of cancer, standard of care in for this stage of this cancer and treatment options  Corey Dial III, MD  12/13/2020

## 2020-12-10 PROBLEM — G89.3 CANCER RELATED PAIN: Status: ACTIVE | Noted: 2020-01-01

## 2020-12-10 PROBLEM — Z99.81 DEPENDENCE ON CONTINUOUS SUPPLEMENTAL OXYGEN: Status: ACTIVE | Noted: 2020-01-01

## 2020-12-10 PROBLEM — C80.1 CANCER WITH UNKNOWN PRIMARY SITE (HCC): Status: ACTIVE | Noted: 2020-01-01

## 2020-12-11 NOTE — TELEPHONE ENCOUNTER
BURAK called Mr. Javed due to his distress score of 7. He had a radiation consultation on 12-10-20 for metastatic squamous cell carcinoma to lymph node. He is a 80 year old male. He does not have a support system. He states he is independent with his ADLS, drives, and is on oxygen. He has two daughters, one lives out of state and the other has health issues. He also has a sister but she lives about 45mins away from him. He plans to drive himself to treatment but states money is very tight and is worried about the cost of gas. Once he starts treatment he will be evaluated to see if he qualifies for assistance through the [employer]. BURAK encouraged him to see if he qualifies for Medicaid but he said he has applied and they said he main too much money. He has two dogs and he is looking to rehome one because it has been difficult for him to take care of both. If he is stressed he will watch TV. 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 His previous occupation was a . BURAK spoke to him assisted livings and SNF but he states, “I’m not ready to do that.” He sleeps in a recliner chair. BURAK will remain available.

## 2020-12-11 NOTE — TELEPHONE ENCOUNTER
Spoke to Mr. Javed, he does have apt magdi with Dr Norris on 12/16/20.  Pt made aware that he will need to see GI regarding abnormal uptate in the rectal area. Pt v/u informed once this is magdi he will be called with time and date of apt.he v/u

## 2020-12-11 NOTE — TELEPHONE ENCOUNTER
----- Message from Murali Mcqueen MD sent at 12/10/2020  7:13 PM CST -----  PET scan 12/10/2020–please confirm that he has an appointment with ENT (Dr. Norris) to assess for head and neck primary, and appoint to GI Re: Abnormal uptake at the rectum.  May need direct visualization.  Thank you

## 2020-12-13 NOTE — PROGRESS NOTES
[facility] HEMATOLOGY AND ONCOLOGY  [address]  formerly [facility] [address] [phone]    Patient Name: Rodrigo Javed Jr.  Encounter Date: 12/16/2020  YOB: 1940  Patient Number: [id number]      REASON FOR VISIT: Rodrigo Javed is a 80-year-old male who returns in follow-up of newly diagnosed metastatic squamous cell carcinoma.  He is seen to discuss the diagnostic information gathered since his initial visit and for subsequent management planning.  He is here with his daughter, Jose Alejandro.    I have reviewed the HPI and verified with the patient the accuracy of it. No changes to interval history since the information was documented. Murali Mcqueen MD 12/16/20     Diagnostic abnormalities:  1.   Medical patient of Dr. Caldera whose history includes COPD with prior exacerbation, acute hypoxemic respiratory failure, tobacco abuse, alcohol abuse, macrocytic anemia, 7 mm left upper lobe pulmonary nodule noted on CT angiogram of the chest, 11/16/2019.  2.   10/30/2020-chest CT (comparison 11/16/2019) for follow-up of solitary pulmonary nodule revealed multiple new and enlarging pulmonary nodules (measuring up to 2.7 cm right upper lobe pulmonary nodule with chest wall and mediastinal abutment; new 1 cm left lower lobe superior segment pulmonary nodule, new 6 mm right inferior upper lobe pulmonary nodule, new 5 mm right lower lobe pulmonary nodule, new 7 mm left upper lobe pulmonary nodule) highly suspicious for pulmonary metastasis.  No enlarged axillary/supraclavicular/mediastinal/hilar lymphadenopathy.  Multiple indeterminate low-density liver lesions, potentially cysts.  3.   10/30/2020-CT soft tissue neck without contrast-large lobular centrally necrotic soft tissue mass measuring 50 x 48 x 44 mm along the right side of the neck.  Metastatic lymphadenopathy suspected.  4.   10/30/2020-glucose 141, BUN 20, creatinine 1.52, GFR 45  otherwise stable CMP, hemoglobin 15.2, MCV 91.9, platelets 286,000, WBC 8.0.  5.   11/06/2020-ultrasound-guided biopsy of right neck mass (suspected lymph node metastasis).  No complications.  Final diagnosis: 1.right neck mass, core biopsy: Squamous cell carcinoma.  2.right neck mass, fine-needle aspiration: Squamous cell carcinoma.  Microscopic description: Immunohistochemical stains for TTF-1-1 and CK 5/6 performed.  Tumor cells stain strongly and diffusely positive for CK 5/6 and negative for TTF-1-1. PD-L1 positive CPS > 1  6.   12/01/2020-alkaline phosphatase of 139 otherwise normal CMP.  CEA 5.48.  Ferritin 58.5.  Iron 49, iron saturation 11%.  Hemoglobin 14.9, hematocrit 48.8, MCV 92.  Platelets 261,000, WBC 7.3.  7.   12/10/2020-PET scan: Dominant 6 cm right neck mass with SUV 18.1.  Central low density suggesting necrosis.  Subtle focal uptake at the left upper quadrant without definite CT correlate.  Direct visualization recommended.  There are 3 FDG avid pulmonary nodules, largest at the right upper lobe which could represent primary pulmonary malignancy or metastatic disease.  FDG uptake in lymph nodes at the bilateral neck, right supraclavicular fossa, mediastinum, bilateral herrera and periportal regions.  Some of the lymph nodes are enlarged.  Mild FDG uptake in the rectum which may be infectious/inflammatory.  However recommend correlation with physical exam and direct visualization if not recently performed.  There are 2 hypodense liver lesions, do not appear to have FDG uptake and may represent hepatic cysts, not optimally evaluated on this exam.  Abdominal aortic aneurysm 4.3 cm.  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  8.   12/10/2020-seen by radiation oncology.  ASSESSMENT AND PLAN  1. 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 Cancer with unknown primary site (CMS/HCC)    3. Cancer related pain    4. Current every day smoker    5. Dependence on  continuous supplemental oxygen       RECOMMENDATIONS: Rodrigo Javed Jr. was diagnosed with Stage IV (cT2,cNx, M1) Squamous cell carcinoma, metastatic right neck mass, 6.3 cm. PET scan revealed hypermetabolic activity in scattered bilateral cervical and right supraclavicular nodes, focal  uptake at the left epiglottis without CT correlate SUV 5.4. RUL nodule 3.1cm SUV 9.6. COSMO 1 cm  nodule SUV 3.4, LLL 1.2 cm nodule SUV 5.0, Pretracheal lymph node 1.4 cm SUV 4.2, a few smaller FDG avid bilateral hilar and mediastinal lymph nodes. Follows Dr. Mcqueen who has ordered further testing on tissue sample to determine primary site and palliative radiation therapy to right neck mass.     We have discussed the indications and rationale of radiation therapy according to the NCCN Guidelines. I have extensively reviewed the risks, benefits and alternatives of therapy and progression of disease in spite of therapy with either local or systemic failure.  I have seen, examined and reviewed his medication list, appropriate labs and imaging studies as well as other physician notes. We discussed the goals/plans of care and answered all questions.      After careful consideration of the diagnostic data and evaluation of the patient, I am recommending to treat the right neck mass with palliative radiation for pain management and to maximize local tumor control. We discussed the PET scan findings which he had completed just prior to our visit today. Dr Fuller will discuss a final plan with him.      His initial o2 level on arrival was 77%, he states he ran out of oxygen since hes been here in town, we were able to call and get him new tanks delivered here during his appt. Saturation level came up to 94.     The patient verbalizes understanding of this discussion, voice no further questions and wish to proceed with recommendations.  We will simulate treatment fields today to begin the treatment planning, I anticipate a dose of  3250 cGy over 13 fractions, final course to be determined.     Rodrigo Javed Jr.  reports that he has been smoking. He has a 82.50 pack-year smoking history. He has never used smokeless tobacco.. I have educated him on the risk of diseases from using tobacco products such as cancer, COPD and heart disease. I advised him to quit and he is not willing to quit. I spent >10 minutes counseling the patient.     Thank you for allowing me to assist in this patients care.      Todays appointment time was spent in counseling and coordination of care as follows: diagnosis, intent of treatment discussing radiation therapy specifics: logistics, possible and probable side effects and after effects, staging of cancer, standard of care in for this stage of this cancer and treatment options  Tita Quinonez PA-C  12/10/2020    Previous interventions:  1.   Anticipate palliative immunotherapy pending ENT evaluation.      LABS    Lab Results - Last 18 Months   Lab Units 12/01/20  1443 10/30/20  1536 12/09/19  1352 11/22/19  0425 11/20/19  0613 11/18/19  0545 11/17/19  0241 11/16/19  0704 11/15/19  1355   HEMOGLOBIN g/dL 14.9 15.2 10.4* 9.1* 9.8* 8.4* 7.5* 8.1* 8.5*   HEMATOCRIT % 48.8 48.7 36.4* 32.4* 36.6* 31.0* 27.6* 29.9* 32.4*   MCV fL 92.8 91.9 79.6 72.2* 73.3* 74.0* 72.1* 71.7* 74.7*   WBC 10*3/mm3 7.30 8.00 5.90 8.48 10.53 8.35 5.22 4.97 6.57   RDW % 16.2* 16.1* 26.3* 21.8* 20.6* 20.0* 20.3* 20.2* 20.5*   MPV fL 9.3 8.2 8.3 10.2 9.8 9.4 10.1 9.9 9.5   PLATELETS 10*3/mm3 261 286 304 407 399 333 305 376 372   IMM GRAN % % 0.3  --   --   --   --   --  1.0*  --   --    NEUTROS ABS 10*3/mm3 4.98 5.50 3.40  --   --  7.63* 4.53 3.94 4.78   LYMPHS ABS 10*3/mm3 1.27 1.80 1.80  --   --  0.33* 0.45* 0.86  --    MONOS ABS 10*3/mm3 0.75  --   --   --   --  0.25 0.19 0.12  --    EOS ABS 10*3/mm3 0.25  --   --   --   --  0.00 0.00 0.00 0.79*   BASOS ABS 10*3/mm3 0.03  --   --   --   --  0.01 0.00 0.01 0.13   IMMATURE GRANS (ABS)  10*3/mm3 0.02  --   --   --   --   --  0.05  --   --    NRBC /100 WBC 0.0  --   --   --   --   --  2.7*  --  4.0*   NEUTROPHIL % %  --   --   --   --   --   --   --   --  72.7   MONOCYTES % %  --   --   --   --   --   --   --   --  4.0*   BASOPHIL % %  --   --   --   --   --   --   --   --  2.0*   ATYP LYMPH % %  --   --   --   --   --   --   --   --  1.0   ANISOCYTOSIS   --   --   --   --   --   --   --   --  Slight/1+   GIANT PLT   --   --   --   --   --   --   --   --  Large/3+       Lab Results - Last 18 Months   Lab Units 12/01/20  1443 10/30/20  1536 12/09/19  1352 11/22/19  0425 11/21/19  0515 11/20/19  0613  11/16/19  0704 11/15/19  1355 10/30/19  1520   GLUCOSE mg/dL 111* 141* 102* 146* 110* 88   < > 171* 128* 103*   SODIUM mmol/L 144 137 144 141 142 140   < > 142 144 140   SODIUM, ARTERIAL   --   --   --   --   --   --    < >  --   --   --    POTASSIUM mmol/L 4.4 4.6 4.8 4.1 4.0 4.0   < > 4.3 3.8 4.7   CO2 mmol/L 28.0 32.0* 26.0 34.0* 37.0* 36.0*   < > 30.0* 33.0* 25.0   CHLORIDE mmol/L 107 102 110 99 98 100   < > 104 103 108   ANION GAP mmol/L 9.0 3.0* 8.0 8.0 7.0 4.0*   < > 8.0 8.0 7.0   CREATININE mg/dL 1.10 1.52* 1.06 1.27 1.15 0.92   < > 1.03 1.06 1.22   BUN mg/dL 11 20 12 30* 25* 21   < > 16 14 15   BUN / CREAT RATIO  10.0 13.2 11.3 23.6 21.7 22.8   < > 15.5 13.2 12.3   CALCIUM mg/dL 9.2 9.6 8.9 8.8 9.2 9.3   < > 8.5* 9.0 8.5   EGFR IF NONAFRICN AM mL/min/1.73 65 45* 68 55* 62 80   < > 70 68 58*   ALK PHOS U/L 139* 107 93  --   --   --   --  119* 145* 116   TOTAL PROTEIN g/dL 7.1 7.7 7.5  --   --   --   --  6.8 7.6 7.5   ALT (SGPT) U/L 10 13 24  --   --   --   --  13 17 17   AST (SGOT) U/L 16 23 24  --   --   --   --  23 27 22   BILIRUBIN mg/dL 0.5 0.8 0.6  --   --   --   --  0.6 0.6 0.6   ALBUMIN g/dL 3.80 4.10 3.90  --   --   --   --  3.10* 3.70 3.70   GLOBULIN gm/dL 3.3 3.6 3.6  --   --   --   --  3.7 3.9 3.8    < > = values in this interval not displayed.       Lab Results - Last 18 Months   Lab  "Units 12/01/20  1443   CEA ng/mL 5.48       Lab Results - Last 18 Months   Lab Units 12/01/20  1443 10/30/20  1536 11/17/19  0241 11/16/19  0704   IRON mcg/dL 49*  --  11*  --    TIBC mcg/dL 446  --  437  --    IRON SATURATION % 11*  --  3*  --    FERRITIN ng/mL 58.51  --  5.87*  --    TSH uIU/mL  --  1.250  --  0.407         PAST MEDICAL HISTORY:  ALLERGIES:  Allergies   Allergen Reactions   • Tetracycline Other (See Comments)     \"Break out in great big blisters\"     CURRENT MEDICATIONS:  Outpatient Encounter Medications as of 12/16/2020   Medication Sig Dispense Refill   • albuterol sulfate  (90 Base) MCG/ACT inhaler INHALE 2 PUFFS BY MOUTH EVERY 4 HOURS     • aluminum-magnesium hydroxide-simethicone (MAALOX MAX) 400-400-40 MG/5ML suspension Take 15 mL by mouth Every 6 (Six) Hours As Needed for Indigestion or Heartburn.     • bisacodyl (DULCOLAX) 5 MG EC tablet Take 2 tablets by mouth Daily As Needed for Constipation.     • Breo Ellipta 100-25 MCG/INH inhaler      • ferrous sulfate (FERROUSUL) 325 (65 FE) MG tablet Take 1 tablet by mouth Daily With Breakfast.     • furosemide (LASIX) 40 MG tablet Take 1 tablet by mouth Daily.     • guaiFENesin (MUCINEX) 600 MG 12 hr tablet Take 2 tablets by mouth Every 12 (Twelve) Hours.     • hydroCHLOROthiazide (HYDRODIURIL) 12.5 MG tablet Take 12.5 mg by mouth Daily.     • hydrocortisone (ANUSOL-HC) 25 MG suppository Insert 1 suppository into the rectum 2 (Two) Times a Day.     • hydrOXYzine (ATARAX) 50 MG tablet Take 1 tablet by mouth At Night As Needed for Anxiety.     • ipratropium-albuterol (DUO-NEB) 0.5-2.5 mg/3 ml nebulizer Take 3 mL by nebulization Every 6 (Six) Hours. 360 mL    • lisinopril (PRINIVIL,ZESTRIL) 10 MG tablet Take 1 tablet by mouth Daily.     • metoprolol succinate XL (TOPROL-XL) 25 MG 24 hr tablet Take 25 mg by mouth Daily.     • nicotine (NICODERM CQ) 21 MG/24HR patch Place 1 patch on the skin as directed by provider Every Night.     • ondansetron " (ZOFRAN) 4 MG tablet Take 1 tablet by mouth Every 6 (Six) Hours As Needed for Nausea or Vomiting.     • potassium chloride ER (K-TAB) 20 MEQ tablet controlled-release ER tablet        No facility-administered encounter medications on file as of 12/16/2020.      Adult illnesses:  History of acute respiratory failure with hypoxia and hypercapnia  Hypertension  Microcytic anemia  COPD with acute exacerbation  History of acute diastolic heart failure  History of pulmonary nodule  Hyperglycemia  Alcoholism/alcohol abuse  Chronic constipation    Past surgeries:  Colonoscopy  Hemorrhoidectomy    ADULT ILLNESSES:  Patient Active Problem List   Diagnosis Code   • Acute respiratory failure with hypoxia and hypercapnia (CMS/[facility]) J96.01, J96.02   • Essential hypertension I10   • Microcytic anemia D50.9   • COPD with acute exacerbation (CMS/[facility]) J44.1   • Acute diastolic heart failure (CMS/[facility]) I50.31   • Pulmonary nodule R91.1   • Hyperglycemia R73.9   • Alcoholism /alcohol abuse (CMS/[facility]) F10.20   • Chronic constipation K59.09   • Obesity E66.9   • Tobacco dependence syndrome F17.200   • Squamous cell lung cancer (CMS/HCC) C34.90   • Current every day smoker F17.200   • 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   • Cancer related pain G89.3   • Dependence on continuous supplemental oxygen Z99.81     SURGERIES:  Past Surgical History:   Procedure Laterality Date   • COLONOSCOPY     • HEMORRHOIDECTOMY       HEALTH MAINTENANCE ITEMS:  Health Maintenance Due   Topic Date Due   • URINE MICROALBUMIN  1940   • TDAP/TD VACCINES (1 - Tdap) 11/09/1961   • ZOSTER VACCINE (1 of 2) 11/09/1992   • Pneumococcal Vaccine 65+ (1 of 1 - PPSV23) 11/09/2007   • HEPATITIS C SCREENING  11/04/2019   • ANNUAL WELLNESS VISIT  11/04/2019   • DIABETIC EYE EXAM  11/04/2019   • INFLUENZA VACCINE  08/01/2020       <no information>  Last Completed Colonoscopy     Patient has no health maintenance due at this time          There is no immunization history  "on file for this patient.  Last Completed Mammogram     Patient has no health maintenance due at this time            FAMILY HISTORY:  Family History   Problem Relation Age of Onset   • No Known Problems Mother    • Cancer Father    • Colon cancer Neg Hx    • Colon polyps Neg Hx    • Esophageal cancer Neg Hx      SOCIAL HISTORY:  Social History     Socioeconomic History   • Marital status: Single     Spouse name: Not on file   • Number of children: Not on file   • Years of education: Not on file   • Highest education level: Not on file   Tobacco Use   • Smoking status: Current Every Day Smoker     Packs/day: 1.50     Years: 55.00     Pack years: 82.50   • Smokeless tobacco: Never Used   Substance and Sexual Activity   • Alcohol use: Yes     Frequency: Never     Comment: weekly   • Drug use: No   • Sexual activity: Never     I reviewed the ROS as documented here and confirmed the accuracy of it with the patient today. 12/16/2020     REVIEW OF SYSTEMS:  Review of Systems   Constitutional: Positive for activity change (\"same in the last year. not more, not less.\"). Negative for appetite change (\"I eat well.\"), chills, diaphoresis, fatigue, fever, unexpected weight gain and unexpected weight loss.        Lives by himself.  Manages his personal ADLs including the chores, errands, driving. Says he spends < 50% up and about.     HENT: Positive for swollen glands (Right neck mass \"same size\") and trouble swallowing (\"occasionally\".  ). Negative for postnasal drip, rhinorrhea, sore throat and voice change.    Eyes: Negative.    Respiratory: Positive for cough (occasional clear phlegm), shortness of breath (Baseline exertional dyspnea and sob with some of his routine activities) and wheezing (\"sometimes\").         Smoker since age 22's - currently 1.5 ppd    Is on home O2   Cardiovascular: Negative.    Gastrointestinal: Negative.  Negative for abdominal pain, blood in stool, constipation, diarrhea, nausea, vomiting and GERD. " "  Endocrine: Negative.    Genitourinary: Negative.  Negative for nocturia.   Musculoskeletal: Positive for joint swelling (left ankle- chronic) and neck pain (related to the neck mass). Negative for arthralgias.   Skin: Negative.    Allergic/Immunologic: Negative.    Neurological: Negative.  Negative for dizziness and headache.   Hematological: Positive for adenopathy (right neck mass).   Psychiatric/Behavioral: Positive for depressed mood. The patient is nervous/anxious.        /70   Pulse 80   Temp 97.1 °F (36.2 °C)   Resp 16   Ht 165.1 cm (65\")   Wt 83.8 kg (184 lb 11.2 oz)   SpO2 91%   BMI 30.74 kg/m²  Body surface area is 1.91 meters squared.  Pain Score    12/16/20 1351   PainSc: 0-No pain     I have reexamined the patient and the results are consistent with the previously documented exam. Murali Mcqueen MD     Physical Exam:  Physical Exam  Vitals signs reviewed.   Constitutional:       Comments: Pleasant, cooperative, heavy-set, modestly kept elderly male.  Ambulatory with a walker. ECOG 3.     HENT:      Head: Normocephalic and atraumatic.      Mouth/Throat:      Comments: Wearing a surgical mask today    He is wearing O2 via cannula  Eyes:      General: No scleral icterus.     Extraocular Movements: Extraocular movements intact.      Pupils: Pupils are equal, round, and reactive to light.   Cardiovascular:      Rate and Rhythm: Normal rate and regular rhythm.      Heart sounds: No murmur. No gallop.    Pulmonary:      Comments: Distant breath sounds  Abdominal:      General: There is no distension.      Palpations: Abdomen is soft. There is no mass.      Tenderness: There is no abdominal tenderness. There is no guarding.      Comments: Globose, soft   Musculoskeletal: Normal range of motion.         General: No swelling.      Right lower leg: No edema.      Left lower leg: Edema (trace) present.   Lymphadenopathy:      Cervical: Cervical adenopathy (Large right sided, firm, fixed, " non-tender anterior mass) present.   Skin:     Coloration: Skin is not jaundiced.      Findings: No bruising, erythema or rash.   Neurological:      General: No focal deficit present.      Mental Status: He is alert and oriented to person, place, and time.      Cranial Nerves: No cranial nerve deficit.   Psychiatric:         Mood and Affect: Mood normal.         Behavior: Behavior normal.         Thought Content: Thought content normal.         Assessment:  1.   Squamous cell carcinoma, metastatic.  Likely epiglottis/supraglottic larynx primary with probable synchronous lung primary with metastases           Original tumor stage (epiglottis): IVC (cT1,cN3, M1)           Original tumor stage (lung):  IV (cT2,cN0,M1)           PD-L1 positive CPS > 1           Tumor burden:  --10/30/2020-chest CT (comparison 11/16/2019) for follow-up of solitary pulmonary nodule revealed multiple new and enlarging pulmonary nodules (measuring up to 2.7 cm right upper lobe pulmonary nodule with chest wall and mediastinal abutment; new 1 cm left lower lobe superior segment pulmonary nodule, new 6 mm right inferior upper lobe pulmonary nodule, new 5 mm right lower lobe pulmonary nodule, new 7 mm left upper lobe pulmonary nodule) highly suspicious for pulmonary metastasis.  No enlarged axillary/supraclavicular/mediastinal/hilar lymphadenopathy.  Multiple indeterminate low-density liver lesions, potentially cysts.  --10/30/2020-CT soft tissue neck without contrast-large lobular centrally necrotic soft tissue mass measuring 50 x 48 x 44 mm along the right side of the neck.  Metastatic lymphadenopathy suspected.  --12/16/2020-flexible laryngoscopy (Dr. Norris).  Obvious lesion of epiglottis and supraglottic larynx.  No limitation of vocal cord mobility.  Likely primary tumor.            Baseline CEA: 5.48, 12/01/2020            Tumor status:  Untreated            Prognosis: Poor    2.   COPD.  On home O2  3.   Tobacco abuse.  Still smoking  "1.5 ppd  4.   Alcohol abuse.  Still drinks sultana, \"occasionally.\"  5.   History of diastolic CHF  6.   Borderline iron deficiency without anemia.  7.   Borderline performance status (PS 2-3)  8.   Solitary living condition.     Plan:  1.   Apprised of the labs and radiographic findings (including PET scan, 12/10/2020 above). Long conference regarding the pathologic findings, extent of disease (metastatic), prognosis (poor), palliative intent of therapy, benefits of therapy, and potential toxicities of same (see below).    2.   CT scans from 10/30/2020 previously reviewed with Dr. Marie of radiology.  Agrees that the 7 mm right upper lobe lung nodule from the CT angiogram last 11/21/2019 corresponds with the 2.7 cm right upper lobe pulmonary nodule seen on most recent CT of the chest, 10/30/2020, indicating a possible primary site.  3.   Pathology from the core needle biopsy taken from the neck mass on 11/06/2020 discussed with Dr. Steve of pathology.  Unfortunately, they are unable to tell me from that specimen where the primary site is.  4.   Discussed with Dr. Norris of ENT, who saw the patient today.  Flexible laryngoscopy revealed an obvious lesion of his epiglottis and supraglottic larynx which is felt to represent the primary tumor..  5.   Appoint to Dr. Wnig Re:  Mediport placement    6.    Re:    Given that the metastatic process in the lungs most life-threatening and is felt to be a second primary, I have reviewed NCCN guidelines version 3.2020 non-small cell lung cancer- for PD-L1 positive (>50%), and EGFR, ALK, ROS 1, BRAF negative with PS 0-2 - squamous cell carcinoma -preferred: Pembrolizumab (category 1) or carboplatin+ (paclitaxel or albumin-bound paclitaxel) + pembrolizumab (category 1).   7.    Teaching sheets for Keytruda.  8.     Re:  The potential toxicities of Keytruda discussed (to include but not limited to: Fatigue, hyperglycemia, hyponatremia, hypoalbuminemia, cough, " nausea, pruritus, rash, decreased appetite, hypertriglyceridemia, hypercholesterolemia, hepatotoxicity, hypocalcemia, constipation, diarrhea, arthralgias, dyspnea, peripheral edema, vomiting, headache, anemia, abdominal pain, back pain, fever, vitiligo, hyperthyroidism, hypothyroidism, facial edema, colitis, pneumonia, renal failure, lymphopenia, confusion, pleural effusion, severe infusion reactions, immune-mediated reactions, exfoliative dermatitis, pneumonitis, pneumonia, pulmonary embolism, sepsis, pancreatitis, type 1 diabetes with ketoacidosis, adrenal insufficiency, nephritis, uveitis, optic neuritis, myasthenia gravis, myositis, rhabdomyolysis, hemolytic anemia, seizures, peripheral neuropathy, respiratory failure). Questions answered to his apparent satisfaction and to the best of my ability. He agrees to a trial of therapy, but only with immunotherapy.  He declines chemotherapy which in this instance (multiple comorbidities, advanced age, borderline-poor performance status) is perfectly reasonable.  The patient is very reluctant to undergo any type of therapy, thus we discussed the other option of conservative management/comfort care and hospice.  He is not averse to the latter, and will discuss this further with his family.     9.    Schedule treatment C1 (plan: every 21 days to progression/toxicity) to be administered Monday, 12/21/2020; then C2, 01/11/2021.     •Keytruda 200 mg IV per administration guidelines, day 1      10.  Premedicate with:     •Benadryl 25 mg IV  •Pepcid 20 mg IV     11.  CBC with differential and CMP on day of Keytruda  12.  Rx:     •Zofran 8 mg by mouth 3 times daily as needed, #30 - pharmacy.        13.  Draw baseline TSH and T4  14.  Discussed findings with Dr. Dial today.  We agreed to proceed with single agent palliative pembrolizumab while palliative radiation will be administered to the epiglottis/supraglottic mass and large neck mass.  15.  Review encounter from Tita  TIMMY Quinonez with Dr. Dial Re: Anticipate 3250 cGy over 13 fractions of palliative radiation to the right neck mass  16.  Review visit with ADAN Maier with GI on 12/14/2020.  Was seen to assess the mild FDG uptake at the rectum.  colonoscopy or [facility] discussed but patient declined.  17.   Return to office in 6 weeks with preoffice TSH, T4, CMP, CEA and CA 27-29.    I spent - at least 75 total minutes, face-to-face, caring for Rodrigo today.  Greater than 50% of this time involved counseling and/or coordination of care as documented within this note regarding the patient's illness(es), pros and cons of various treatment options, instructions and/or risk reduction.

## 2020-12-14 NOTE — PROGRESS NOTES
Chief Complaint   Patient presents with   • Imaging Only     abdnormal pet scan       PCP: Delvin Caldera MD  REFER: Murali Mcqueen MD    Subjective     HPI    .Rodrigo Javed Jr. referred for abnormal PET scan dated 12/10/2020.  this is a new finding.  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  He underwent biopsy of neck mass 11/6/2020.  PET scan was performed for further evaluation of abnormal pathology of neck mass.   Rodrigo Javed Jr. denies abdominal pain.  No change in bowels.  no melena.  No weight loss. Observes bright red blood apx once per week.  Blood described as occurring in  small  amount and noted on toilet paper.  Blood associated when he has difficulty cleaning after bowel movement.  Colonoscopy 20 years ago in [place].  He has been utilizing NSAIDs x 2 month to relieve pain from neck mass.    PET SCAN FINDING:   Focal mild FDG uptake at the rectum without associated CT finding    Past Medical History:   Diagnosis Date   • Cataract    • Hypertension        Past Surgical History:   Procedure Laterality Date   • COLONOSCOPY     • HEMORRHOIDECTOMY         Outpatient Medications Marked as Taking for the 12/14/20 encounter (Office Visit) with Jani Quinn APRN   Medication Sig Dispense Refill   • albuterol sulfate  (90 Base) MCG/ACT inhaler INHALE 2 PUFFS BY MOUTH EVERY 4 HOURS     • aluminum-magnesium hydroxide-simethicone (MAALOX MAX) 400-400-40 MG/5ML suspension Take 15 mL by mouth Every 6 (Six) Hours As Needed for Indigestion or Heartburn.     • bisacodyl (DULCOLAX) 5 MG EC tablet Take 2 tablets by mouth Daily As Needed for Constipation.     • Breo Ellipta 100-25 MCG/INH inhaler      • ferrous sulfate (FERROUSUL) 325 (65 FE) MG tablet Take 1 tablet by mouth Daily With Breakfast.     • furosemide (LASIX) 40 MG tablet Take 1 tablet by mouth Daily.     • guaiFENesin (MUCINEX) 600 MG 12 hr tablet Take 2 tablets  "by mouth Every 12 (Twelve) Hours.     • hydroCHLOROthiazide (HYDRODIURIL) 12.5 MG tablet Take 12.5 mg by mouth Daily.     • hydrocortisone (ANUSOL-HC) 25 MG suppository Insert 1 suppository into the rectum 2 (Two) Times a Day.     • ipratropium-albuterol (DUO-NEB) 0.5-2.5 mg/3 ml nebulizer Take 3 mL by nebulization Every 6 (Six) Hours. 360 mL    • lisinopril (PRINIVIL,ZESTRIL) 10 MG tablet Take 1 tablet by mouth Daily.     • metoprolol succinate XL (TOPROL-XL) 25 MG 24 hr tablet Take 25 mg by mouth Daily.     • nicotine (NICODERM CQ) 21 MG/24HR patch Place 1 patch on the skin as directed by provider Every Night.     • ondansetron (ZOFRAN) 4 MG tablet Take 1 tablet by mouth Every 6 (Six) Hours As Needed for Nausea or Vomiting.     • potassium chloride ER (K-TAB) 20 MEQ tablet controlled-release ER tablet          Allergies   Allergen Reactions   • Tetracycline Other (See Comments)     \"Break out in great big blisters\"       Social History     Socioeconomic History   • Marital status: Single     Spouse name: Not on file   • Number of children: Not on file   • Years of education: Not on file   • Highest education level: Not on file   Tobacco Use   • Smoking status: Current Every Day Smoker     Packs/day: 1.50     Years: 55.00     Pack years: 82.50   • Smokeless tobacco: Never Used   Substance and Sexual Activity   • Alcohol use: Yes     Frequency: Never     Comment: weekly   • Drug use: No   • Sexual activity: Never       Family History   Problem Relation Age of Onset   • No Known Problems Mother    • Cancer Father    • Colon cancer Neg Hx    • Colon polyps Neg Hx    • Esophageal cancer Neg Hx        Review of Systems   Constitutional: Negative for fatigue, fever and unexpected weight change.   HENT: Negative for hearing loss, sore throat and voice change.    Eyes: Negative for visual disturbance.   Respiratory: Negative for cough, shortness of breath and wheezing.    Cardiovascular: Negative for chest pain and " "palpitations.   Gastrointestinal: Positive for anal bleeding. Negative for abdominal pain, blood in stool and vomiting.   Endocrine: Negative for polydipsia and polyuria.   Genitourinary: Negative for difficulty urinating, dysuria, hematuria and urgency.   Musculoskeletal: Negative for joint swelling and myalgias.   Skin: Negative for color change, rash and wound.   Neurological: Negative for dizziness, tremors, seizures and syncope.   Hematological: Does not bruise/bleed easily.   Psychiatric/Behavioral: Negative for agitation and confusion. The patient is not nervous/anxious.        Objective     Vitals:    12/14/20 1255   BP: 140/70   Pulse: 70   Temp: 98 °F (36.7 °C)   SpO2: 94%   Weight: 82.6 kg (182 lb)   Height: 165.1 cm (65\")     Body mass index is 30.29 kg/m².    Physical Exam  Constitutional:       Appearance: He is well-developed.   HENT:      Head: Normocephalic and atraumatic. Mass (right neck) present.   Eyes:      Comments: Pink, Nonicteric   Neck:      Comments: Global Assessment- supple. No JVD or lymphadenopathy  Cardiovascular:      Rate and Rhythm: Normal rate and regular rhythm.      Heart sounds: Normal heart sounds. No murmur. No friction rub. No gallop.    Pulmonary:      Effort: Pulmonary effort is normal. No respiratory distress.      Breath sounds: Normal breath sounds. No wheezing or rales.   Abdominal:      General: Bowel sounds are normal. There is no distension.      Palpations: Abdomen is soft. There is no mass.      Tenderness: There is no abdominal tenderness. There is no guarding or rebound.   Neurological:      Mental Status: He is alert and oriented to person, place, and time.      Comments: General Exam-Deemed a reliable historian, able to converse without difficulty and Able to move all extremities without difficulty         Imaging Results (Most Recent)     None          Body mass index is 30.29 kg/m².    Assessment/Plan     Diagnoses and all orders for this visit:    1. " Abnormal gastrointestinal PET scan (Primary)  -     Case Request; Standing  -     Implement Anesthesia Orders Day of Procedure; Standing  -     Obtain Informed Consent; Standing        * Surgery not found *    Lengthy explanation about abnormality to PET scan and need for further evaluation with colonoscopy.  Rodrigo Javed Jr. did not wish to proceed with colonoscopy  due to being told mass/lung cancer is incurable.  I discussed LLC to evaluate area of abnormality on PET scan.  I explained we would not be able to comment on rest of colon but area of abnormality could be evaluated.  Rodrigo JACKIE Javed JrKaron wished to discuss options further with Dr Mcqueen as he has been told his cancer is not curable at this time.  Rodrigo JACKIE Javed JrKaron was agreeable to call office if he wishes to proceed with LLC or colonoscopy     He did not wish to follow up with Dr Eden    All risks, benefits, alternatives, and indications of colonoscopy procedure have been discussed with the patient. Risks to include perforation of the colon requiring possible surgery or colostomy, risk of bleeding from biopsies or removal of colon tissue, possibility of missing a colon polyp or cancer, or adverse drug reaction.  Benefits to include the diagnosis and management of disease of the colon and rectum. Alternatives to include barium enema, radiographic evaluation, lab testing or no intervention. Pt verbalizes understanding     Patient's Body mass index is 30.29 kg/m². BMI is above normal parameters. Recommendations include: no follow up.       Jani Quinn, APRN  12/14/20          There are no Patient Instructions on file for this visit.

## 2020-12-15 PROBLEM — C77.0 MALIGNANT NEOPLASM METASTATIC TO LYMPH NODE OF HEAD, FACE, OR NECK WITH UNKNOWN PRIMARY SITE (HCC): Status: ACTIVE | Noted: 2020-01-01

## 2020-12-16 PROBLEM — C32.1 CANCER OF SUPRAGLOTTIS (HCC): Status: ACTIVE | Noted: 2020-01-01

## 2020-12-16 NOTE — PROGRESS NOTES
Procedure   Laryngoscopy    Date/Time: 12/16/2020 1:07 PM  Performed by: Delvin Norris MD  Authorized by: Delvin Norris MD   Comments: Procedure Note    Anesthesia: topical 4% tetracaine and oxymetazoline mix    Endoscopy Type: Flexible Laryngoscopy    Indications for Procedure: Large neck mass with concerns of head neck malignancy    Procedure Details:    The patient was placed in the sitting position.  The 4 mm laryngoscope was passed.  The nasal cavities, nasopharynx, oropharynx, hypopharynx, and larynx were all examined.  Vocal cords were examined during respiration and phonation.  The following findings were noted:    Findings: The base of tongue and nasopharynx are within normal limits.  The lingual surface of the epiglottis is free of tumor.  At the petiole region of the epiglottis, there is a ulcerated tumor that appears to be in the midline going off towards the left-hand side.  This appears to be the primary source of his squamous cell carcinoma from his previously fine-needle aspirated right neck mass.  This appears to be confined to the epiglottis and does not extend down onto the false vocal cords.  It does not limit the vocal cord mobility and is not bulky in nature.  (See pictures)    Condition:  Stable.  Patient tolerated procedure well.    Complications:  None

## 2020-12-16 NOTE — PATIENT INSTRUCTIONS
CONTACT INFORMATION:  The main office phone number is [phone]. For emergencies after hours and on weekends, this number will convert over to our answering service and the on call provider will answer. Please try to keep non emergent phone calls/ questions to office hours 9am-5pm Monday through Friday.     [employer]  As an alternative, you can sign up and use the Epic MyChart system for more direct and quicker access for non emergent questions/ problems.  [employer] allows you to send messages to your doctor, view your test results, renew your prescriptions, schedule appointments, and more. To sign up, go to [employer] and click on the Sign Up Now link in the New User? box. Enter your [employer]pert Activation Code exactly as it appears below along with the last four digits of your Social Security Number and your Date of Birth () to complete the sign-up process. If you do not sign up before the expiration date, you must request a new code.    [employer]pert Activation Code: Activation code not generated  Current Sanaexpert Status: Patient Declined    If you have questions, you can email [email or web address] or call [phone] to talk to our Sana[employer]pert staff. Remember, [employer]pert is NOT to be used for urgent needs. For medical emergencies, dial 911.    IF YOU SMOKE OR USE TOBACCO PLEASE READ THE FOLLOWING:  Why is smoking bad for me?  Smoking increases the risk of heart disease, lung disease, vascular disease, stroke, and cancer. If you smoke, STOP!      IF YOU SMOKE OR USE TOBACCO PLEASE READ THE FOLLOWING:  Why is smoking bad for me?  Smoking increases the risk of heart disease, lung disease, vascular disease, stroke, and cancer. If you smoke, STOP!    For more information:  Quit Now [place]  -QUIT-NOW  [email or web address]

## 2020-12-16 NOTE — PROGRESS NOTES
Delvin Norris MD     Chief Complaint   Patient presents with   • Neck Mass          HISTORY OF PRESENT ILLNESS:  Rodrigo Javed Jr. is a 80 y.o. male who is a medical patient of Dr. Caldera whose history includes COPD with prior exacerbation, acute hypoxemic respiratory failure, tobacco abuse, alcohol abuse, macrocytic anemia, 7 mm left upper lobe pulmonary nodule noted on CT angiogram of the chest, 11/16/2019.  Further work-up also revealed a right sided large neck mass.  The PET scan which showed hypermetabolic activity in this area as well as a focal area just to the right of the epiglottis.  He has already been seen by Dr. Mcqueen and Dr. Dial and his being evaluated for chemoradiation therapy.  Fine-needle aspiration of the right neck mass revealed squamous cell carcinoma.   He  reports that he has been smoking. He has a 82.50 pack-year smoking history. He has never used smokeless tobacco. He reports current alcohol use. He reports that he does not use drugs.  He also was found to have a colon lesion and has seen gastroenterology.  He is still considering whether he is going to proceed with colonoscopy or not.    Review of Systems   Constitutional: Negative for chills and fever.   HENT: Positive for sore throat and trouble swallowing.    Eyes: Negative for pain and redness.   Respiratory: Positive for cough, choking and shortness of breath.    Cardiovascular: Negative for chest pain and palpitations.   Gastrointestinal: Negative for diarrhea, nausea and vomiting.   Genitourinary: Negative for decreased urine volume and urgency.   Musculoskeletal: Negative for neck pain and neck stiffness.   Skin: Negative for rash.   Neurological: Negative for tremors and weakness.      I have reviewed the ROS as documented by the MA/LPN/RN Delvin Norris MD      History     Last Reviewed by Delvin Norris MD on 12/16/2020 at  1:17 PM    Sections Reviewed    Tobacco, Family, Medical, Surgical         Problem list reviewed by Delvin Norris MD on 12/16/2020 at  1:17 PM  Medicines reviewed by Delvin Norris MD on 12/16/2020 at  1:17 PM  Allergies reviewed by Delvin Norris MD on 12/16/2020 at  1:17 PM         Vital Signs:   Temp:  [97.3 °F (36.3 °C)] 97.3 °F (36.3 °C)  Heart Rate:  [103] 103  BP: (144)/(106) 144/106    Physical Exam  CONSTITUTIONAL: He is obese and elderly.  He is not in good health.  He is currently in no acute distress but does appear chronically ill  COMMUNICATION AND VOICE: able to communicate normally, normal voice quality  HEAD: normocephalic, no lesions, atraumatic, no tenderness, no masses   FACE: appearance normal, no lesions, no tenderness, no deformities, facial motion symmetric  SALIVARY GLANDS: parotid glands with no tenderness, no swelling, no masses, submandibular glands with normal size, nontender  EYES: ocular motility normal, eyelids normal, orbits normal, no proptosis, conjunctiva normal , pupils equal, round  HEARING: response to conversational voice normal bilaterally   EXTERNAL EARS: auricles without lesions  EXTERNAL EAR CANALS: normal ear canals without stenosis or significant cerumen  TYMPANIC MEMBRANES: tympanic membrane appearance normal, no lesions, no perforation, normal mobility, no fluid  EXTERNAL NOSE: structure normal, no tenderness on palpation, no nasal discharge, no lesions, no evidence of trauma, nostrils patent  INTRANASAL EXAM: nasal mucosa normal, vestibule within normal limits, inferior turbinate normal,  nasal septum without overt anterior deviation  NASOPHARYNX: nasopharyngeal mucosa, adenoids within normal limits  LIPS: structure normal, no tenderness on palpation, no lesions, no evidence of trauma  TEETH: dentition within normal limits for age  GUMS: gingivae healthy  ORAL MUCOSA: oral mucosa with diffuse dryness, no mucosal lesions   FLOOR OF MOUTH: Warthin's duct patent, mucosa normal  TONGUE: lingual mucosa normal without  lesions, normal tongue mobility  PALATE: soft and hard palates with normal mucosa and structure  OROPHARYNX: oropharyngeal mucosa normal, tonsil fossa normal in appearance  HYPOPHARYNX: hypopharyngeal mucosa normal  LARYNX: See scope exam  There is a ulcerated lesion at the petiole region of the epiglottis that seems to have some bulkiness off to the left-hand side.  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  NECK: There is a large 8 cm firm neck mass in the central portion of the right neck that is fixed but nontender.  THYROID: no overt thyromegaly, no tenderness, nodules or mass present on palpation, position midline   LYMPHATIC: There is lymphadenopathy of the right neck as described above  CHEST/RESPIRATORY: respiratory effort normal, normal breath sounds  CARDIOVASCULAR: rate and rhythm normal, extremities without cyanosis or edema, no overt jugulovenous distension present  NEUROLOGIC/PSYCHIATRIC: oriented appropriately for age, mood normal, affect appropriate, cranial nerves intact grossly unless specifically mentioned above       RESULTS REVIEW:    I have reviewed the patients old records in the chart.  The following results/records were reviewed:    Fine Needle Aspiration (11/06/2020 11:12)   CT Chest Without Contrast (10/30/2020 16:51)   CT Soft Tissue Neck Without Contrast (10/30/2020 16:51)   US Head Neck Soft Tissue (11/06/2020 12:49)   NM Pet Skull Base To Mid Thigh (12/10/2020 12:58)  There is a large right-sided neck mass with hypermetabolic activity on PET scan.  There is a small focal area of increased uptake on the PET scan just to the right of the epiglottis region.          Assessment:       1. Cancer of supraglottis (CMS/HCC)    2. Tobacco dependence syndrome    3. Alcoholism /alcohol abuse (CMS/HCC)    4. Squamous cell carcinoma of lung, unspecified laterality (CMS/HCC)            Plan:              Orders Placed This Encounter   Procedures   •  Laryngoscopy     I feel the supraglottic lesion represents his primary site.  Normally we would proceed with direct laryngoscopy with biopsy but I am concerned about his lung function and the ability to tolerate general anesthesia.  I will discuss this further with oncology to see if biopsies are necessary given the fact that we have tissue diagnosis via fine-needle aspiration.  If so, he will need pulmonary function evaluations to be able to tolerate the general anesthesia.  If not, I would treat this as the primary site with radiation and chemotherapy.    Return in about 2 months (around 2/16/2021).            Delvin Norris MD  12/16/20  13:23 CST

## 2020-12-17 NOTE — TELEPHONE ENCOUNTER
Rodrigo is wanting to know if his appt 12/18 for chemo teach will be over the phone or in-person    Ph# [phone]

## 2020-12-17 NOTE — TELEPHONE ENCOUNTER
"Received a call from [facility] that patient was requesting a call from Dr. Dial.  I called him and asked what question he had.  He was confirming his appointment for Monday for XRT.   I checked schedule, he is currently scheduled for Lab at 11:30 am and Keytruda infusion at 11:45 am as well as XRT at 12:30 pm.  He said \"The only appointment I am keeping is the Radiation treatment at 12:30 pm.  I am not taking any chemo right now.\"  I informed him that I would speak with Agueda Mcintosh with Dr. Mcqueen to relay his decision.  He verbalized appreciation for informing them and said he would see us Monday at 12:30 pm for XRT.  "

## 2020-12-29 NOTE — PROGRESS NOTES
BURAK met with Mr. Javed and his daughter, Jose Alejandro, regarding transportation issues. Jose Alejandro has been driving Mr. Javed to his radiation appointments but states she will not be able to do this anymore due to her job and she lives an hour away from Mr. Javed. Mr. Javed was evaluated for transportation assistance through the [employer] and he does meet criteria. BURAK set up transportation for his radiation treatments on Jan 4th- 8th and Jan 11th- 14th. Transportation was set up with [employer]. BURAK spoke to rep Rankin and informed her, his appointment time is 1:20P.M. every day for the dates listed above, every trip is a round trip, he ambulated with a walker, has oxygen, and will be traveling alone. She verbalized understanding and said transportation can pick Mr. Javed up to 1.5 hours prior to his appointment time. Confirmation number for his trips are [id number]. Phone number for [employer] is [phone]. Informed Mr. Javed and daughter if he is unable to make it to treatment he needs to inform this writer as early as possible so transportation can be canceled. If he is a “no show” and transportation is charged all other transportations that have been arranged will be canceled. BURAK will remain available.

## 2021-01-01 ENCOUNTER — HOSPITAL ENCOUNTER (OUTPATIENT)
Dept: RADIATION ONCOLOGY | Facility: HOSPITAL | Age: 81
Setting detail: RADIATION/ONCOLOGY SERIES
Discharge: HOME | End: 2021-01-22

## 2021-01-01 ENCOUNTER — OFFICE VISIT (OUTPATIENT)
Dept: ONCOLOGY | Facility: CLINIC | Age: 81
End: 2021-01-01

## 2021-01-01 ENCOUNTER — HOSPITAL ENCOUNTER (OUTPATIENT)
Dept: RADIATION ONCOLOGY | Facility: HOSPITAL | Age: 81
Setting detail: RADIATION/ONCOLOGY SERIES
Discharge: HOME | End: 2021-01-15

## 2021-01-01 ENCOUNTER — HOSPITAL ENCOUNTER (OUTPATIENT)
Dept: RADIATION ONCOLOGY | Facility: HOSPITAL | Age: 81
Setting detail: RADIATION/ONCOLOGY SERIES
Discharge: HOME | End: 2021-01-08

## 2021-01-01 ENCOUNTER — HOSPITAL ENCOUNTER (OUTPATIENT)
Dept: RADIATION ONCOLOGY | Facility: HOSPITAL | Age: 81
Setting detail: RADIATION/ONCOLOGY SERIES
End: 2021-01-01

## 2021-01-01 ENCOUNTER — HOSPITAL ENCOUNTER (OUTPATIENT)
Dept: RADIATION ONCOLOGY | Facility: HOSPITAL | Age: 81
Setting detail: RADIATION/ONCOLOGY SERIES
Discharge: HOME | End: 2021-01-05

## 2021-01-01 ENCOUNTER — HOSPITAL ENCOUNTER (OUTPATIENT)
Dept: RADIATION ONCOLOGY | Facility: HOSPITAL | Age: 81
Setting detail: RADIATION/ONCOLOGY SERIES
Discharge: HOME | End: 2021-01-20

## 2021-01-01 ENCOUNTER — HOSPITAL ENCOUNTER (OUTPATIENT)
Dept: RADIATION ONCOLOGY | Facility: HOSPITAL | Age: 81
Setting detail: RADIATION/ONCOLOGY SERIES
Discharge: HOME | End: 2021-01-07

## 2021-01-01 ENCOUNTER — HOSPITAL ENCOUNTER (OUTPATIENT)
Dept: RADIATION ONCOLOGY | Facility: HOSPITAL | Age: 81
Setting detail: RADIATION/ONCOLOGY SERIES
Discharge: HOME | End: 2021-01-06

## 2021-01-01 ENCOUNTER — HOSPITAL ENCOUNTER (OUTPATIENT)
Dept: RADIATION ONCOLOGY | Facility: HOSPITAL | Age: 81
Setting detail: RADIATION/ONCOLOGY SERIES
Discharge: HOME | End: 2021-01-14

## 2021-01-01 ENCOUNTER — TELEPHONE (OUTPATIENT)
Dept: ONCOLOGY | Facility: CLINIC | Age: 81
End: 2021-01-01

## 2021-01-01 ENCOUNTER — TELEPHONE (OUTPATIENT)
Dept: RADIATION ONCOLOGY | Facility: HOSPITAL | Age: 81
End: 2021-01-01

## 2021-01-01 ENCOUNTER — HOSPITAL ENCOUNTER (OUTPATIENT)
Dept: RADIATION ONCOLOGY | Facility: HOSPITAL | Age: 81
Setting detail: RADIATION/ONCOLOGY SERIES
Discharge: HOME | End: 2021-01-21

## 2021-01-01 ENCOUNTER — DOCUMENTATION (OUTPATIENT)
Dept: RADIATION ONCOLOGY | Facility: HOSPITAL | Age: 81
End: 2021-01-01

## 2021-01-01 ENCOUNTER — HOSPITAL ENCOUNTER (OUTPATIENT)
Dept: RADIATION ONCOLOGY | Facility: HOSPITAL | Age: 81
Setting detail: RADIATION/ONCOLOGY SERIES
Discharge: HOME | End: 2021-01-04

## 2021-01-01 ENCOUNTER — APPOINTMENT (OUTPATIENT)
Dept: RADIATION ONCOLOGY | Facility: HOSPITAL | Age: 81
End: 2021-01-01

## 2021-01-01 ENCOUNTER — TELEPHONE (OUTPATIENT)
Dept: FAMILY MEDICINE CLINIC | Facility: CLINIC | Age: 81
End: 2021-01-01

## 2021-01-01 ENCOUNTER — HOSPITAL ENCOUNTER (OUTPATIENT)
Dept: RADIATION ONCOLOGY | Facility: HOSPITAL | Age: 81
Setting detail: RADIATION/ONCOLOGY SERIES
Discharge: HOME | End: 2021-01-19

## 2021-01-01 VITALS
WEIGHT: 170.9 LBS | HEART RATE: 83 BPM | TEMPERATURE: 97.4 F | OXYGEN SATURATION: 91 % | SYSTOLIC BLOOD PRESSURE: 128 MMHG | DIASTOLIC BLOOD PRESSURE: 64 MMHG | RESPIRATION RATE: 16 BRPM | HEIGHT: 65 IN | BODY MASS INDEX: 28.47 KG/M2

## 2021-01-01 DIAGNOSIS — C34.90 SQUAMOUS CELL CARCINOMA OF LUNG, UNSPECIFIED LATERALITY (HCC): ICD-10-CM

## 2021-01-01 DIAGNOSIS — C32.1 CANCER OF SUPRAGLOTTIS (HCC): ICD-10-CM

## 2021-01-01 DIAGNOSIS — K64.9 HEMORRHOIDS, UNSPECIFIED HEMORRHOID TYPE: Primary | ICD-10-CM

## 2021-01-01 DIAGNOSIS — C34.90 SQUAMOUS CELL CARCINOMA OF LUNG, UNSPECIFIED LATERALITY (HCC): Primary | ICD-10-CM

## 2021-01-01 LAB
QT INTERVAL: 382 MS
QTC INTERVAL: 435 MS

## 2021-01-01 PROCEDURE — 77417 THER RADIOLOGY PORT IMAGE(S): CPT | Performed by: RADIOLOGY

## 2021-01-01 PROCEDURE — 77412 RADIATION TX DELIVERY LVL 3: CPT | Performed by: RADIOLOGY

## 2021-01-01 PROCEDURE — 77336 RADIATION PHYSICS CONSULT: CPT | Performed by: RADIOLOGY

## 2021-01-01 PROCEDURE — 77334 RADIATION TREATMENT AID(S): CPT | Performed by: RADIOLOGY

## 2021-01-01 PROCEDURE — 77300 RADIATION THERAPY DOSE PLAN: CPT | Performed by: RADIOLOGY

## 2021-01-01 PROCEDURE — 99214 OFFICE O/P EST MOD 30 MIN: CPT | Performed by: INTERNAL MEDICINE

## 2021-01-01 PROCEDURE — 77332 RADIATION TREATMENT AID(S): CPT | Performed by: RADIOLOGY

## 2021-01-01 RX ORDER — LISINOPRIL 10 MG/1
10 TABLET ORAL
Qty: 90 TABLET | Refills: 1
Start: 2021-01-01 | End: 2021-07-25

## 2021-01-01 RX ORDER — LIDOCAINE HYDROCHLORIDE 20 MG/ML
5 SOLUTION OROPHARYNGEAL AS NEEDED
Qty: 100 ML | Refills: 4 | Status: SHIPPED | OUTPATIENT
Start: 2021-01-01 | End: 2021-01-01

## 2021-01-01 RX ORDER — HYDROCORTISONE ACETATE 25 MG/1
25 SUPPOSITORY RECTAL 2 TIMES DAILY
Qty: 28 SUPPOSITORY | Refills: 2 | Status: SHIPPED | OUTPATIENT
Start: 2021-01-01 | End: 2021-01-01

## 2021-01-01 RX ORDER — HYDROCODONE BITARTRATE AND ACETAMINOPHEN 5; 325 MG/1; MG/1
1 TABLET ORAL EVERY 6 HOURS PRN
Qty: 60 TABLET | Refills: 0 | Status: SHIPPED | OUTPATIENT
Start: 2021-01-01

## 2021-01-08 NOTE — PROGRESS NOTES
Mr. Javed informed BURAK that he needs to cancel his radiation appointments for Jan 11 and Jan 12th. BURAK contacted [facility] and spoke to rep Monreal who has canceled transportation for the two days. BURAK also set up transportation for Mr. Javed through [place] for Jan 15th, 18th, 19th, and 20th. Rep is aware his appointment time is 1:20P.M. every day for the dates listed above, every trip is a round trip, he ambulated with a walker, has oxygen, and will be traveling alone. She verbalized understanding and said transportation can pick Mr. Javed up to 1.5 hours prior to his appointment time. Mr. Javed is aware of the pickup time.  BURAK will remain available.

## 2021-01-15 NOTE — TELEPHONE ENCOUNTER
Spoke with Maral. I let her know that I was not sure when he would get another PET. I told her that Dr. Dial usually waits a set amount of time before rescanning. She will call their office and see what Dr. Dial's plans were. She feels like her dad will be ready for Hospice soon. I let her know that when they are ready we can make that referral. Patient previously declined keytruda tx but she doesn't know if he will decide to do tx at his next appt.

## 2021-01-15 NOTE — TELEPHONE ENCOUNTER
Jose Alejandro, patient's daughter, calling to speak to Dr. Mcqueen's nurse.    She is wondering if patient would have another PET scan after radiation.    She also wonders if there is someone there to talk for her to talk to regarding how to handle patient and his needs since he has been diagnosed with terminal cancer.    [phone]

## 2021-01-18 NOTE — TELEPHONE ENCOUNTER
BURAK returned call from Mr. Javed who states he will not be able to come to radiation treatment today. Mr. Javed states a lady has been coming to help him get ready in the morning and she was unable to come today. BURAK called [facility] and spoke to Kavitha Abbasi and informed her to cancel transportation today. BURAK informed radiation therapist that Mr. Javed has to cancel his appointment today. BURAK spoke to rep at [facility], and set up transportation for Mr. Javed for Jan 21st and 22nd. Rep is aware his appointment time is 1:20P.M. for both days, every trip is a round trip, he ambulates with a walker, has oxygen, and will be traveling alone. She verbalized understanding and said transportation can pick Mr. Javed up to 1.5 hours prior to his appointment time. Mr. Javed is aware of the pickup time. SW will remain available.

## 2021-01-24 NOTE — PROGRESS NOTES
[facility] HEMATOLOGY AND ONCOLOGY  [address]  [facility] [address] [phone]    Patient Name: Rodrigo Javed Jr.  Encounter Date: 01/27/2021  YOB: 1940  Patient Number: [id number]        REASON FOR VISIT: Rodrigo Javed is a 80-year-old male who returns in follow-up of newly diagnosed metastatic squamous cell carcinoma.  The patient was scheduled to start palliative pembrolizumab but called when he decided to decline chemotherapy.  He has completed palliative radiation to the right neck mass that was started on 12/28/2020 through 01/22/2021 (completed 3250 cGy over 13 fractions).  On 01/15/2021, with patient's daughter called to state that her father would be ready for hospice soon.  He is here with his daughter, Jose Alejandro.         Diagnostic abnormalities:  1.   Medical patient of Dr. Caldera whose history includes COPD with prior exacerbation, acute hypoxemic respiratory failure, tobacco abuse, alcohol abuse, macrocytic anemia, 7 mm left upper lobe pulmonary nodule noted on CT angiogram of the chest, 11/16/2019.  2.   10/30/2020-chest CT (comparison 11/16/2019) for follow-up of solitary pulmonary nodule revealed multiple new and enlarging pulmonary nodules (measuring up to 2.7 cm right upper lobe pulmonary nodule with chest wall and mediastinal abutment; new 1 cm left lower lobe superior segment pulmonary nodule, new 6 mm right inferior upper lobe pulmonary nodule, new 5 mm right lower lobe pulmonary nodule, new 7 mm left upper lobe pulmonary nodule) highly suspicious for pulmonary metastasis.  No enlarged axillary/supraclavicular/mediastinal/hilar lymphadenopathy.  Multiple indeterminate low-density liver lesions, potentially cysts.  3.   10/30/2020-CT soft tissue neck without contrast-large lobular centrally necrotic soft tissue mass measuring 50 x 48 x 44 mm along the right side of the neck.  Metastatic lymphadenopathy  suspected.  4.   10/30/2020-glucose 141, BUN 20, creatinine 1.52, GFR 45 otherwise stable CMP, hemoglobin 15.2, MCV 91.9, platelets 286,000, WBC 8.0.  5.   11/06/2020-ultrasound-guided biopsy of right neck mass (suspected lymph node metastasis).  No complications.  Final diagnosis: 1.right neck mass, core biopsy: Squamous cell carcinoma.  2.right neck mass, fine-needle aspiration: Squamous cell carcinoma.  Microscopic description: Immunohistochemical stains for TTF-1-1 and CK 5/6 performed.  Tumor cells stain strongly and diffusely positive for CK 5/6 and negative for TTF-1-1. PD-L1 positive CPS > 1  6.   12/01/2020-alkaline phosphatase of 139 otherwise normal CMP.  CEA 5.48.  Ferritin 58.5.  Iron 49, iron saturation 11%.  Hemoglobin 14.9, hematocrit 48.8, MCV 92.  Platelets 261,000, WBC 7.3.  7.   12/10/2020-PET scan: Dominant 6 cm right neck mass with SUV 18.1.  Central low density suggesting necrosis.  Subtle focal uptake at the left upper quadrant without definite CT correlate.  Direct visualization recommended.  There are 3 FDG avid pulmonary nodules, largest at the right upper lobe which could represent primary pulmonary malignancy or metastatic disease.  FDG uptake in lymph nodes at the bilateral neck, right supraclavicular fossa, mediastinum, bilateral herrera and periportal regions.  Some of the lymph nodes are enlarged.  Mild FDG uptake in the rectum which may be infectious/inflammatory.  However recommend correlation with physical exam and direct visualization if not recently performed.  There are 2 hypodense liver lesions, do not appear to have FDG uptake and may represent hepatic cysts, not optimally evaluated on this exam.  Abdominal aortic aneurysm 4.3 cm.  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  8.   12/10/2020-seen by radiation oncology.  ASSESSMENT AND PLAN  1. 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 Cancer with unknown primary site (CMS/HCC)    3.  Cancer related pain    4. Current every day smoker    5. Dependence on continuous supplemental oxygen       RECOMMENDATIONS: Rodrigo Javed Jr. was diagnosed with Stage IV (cT2,cNx, M1) Squamous cell carcinoma, metastatic right neck mass, 6.3 cm. PET scan revealed hypermetabolic activity in scattered bilateral cervical and right supraclavicular nodes, focal  uptake at the left epiglottis without CT correlate SUV 5.4. RUL nodule 3.1cm SUV 9.6. COSMO 1 cm  nodule SUV 3.4, LLL 1.2 cm nodule SUV 5.0, Pretracheal lymph node 1.4 cm SUV 4.2, a few smaller FDG avid bilateral hilar and mediastinal lymph nodes. Follows Dr. Mcqueen who has ordered further testing on tissue sample to determine primary site and palliative radiation therapy to right neck mass.     We have discussed the indications and rationale of radiation therapy according to the NCCN Guidelines. I have extensively reviewed the risks, benefits and alternatives of therapy and progression of disease in spite of therapy with either local or systemic failure.  I have seen, examined and reviewed his medication list, appropriate labs and imaging studies as well as other physician notes. We discussed the goals/plans of care and answered all questions.      After careful consideration of the diagnostic data and evaluation of the patient, I am recommending to treat the right neck mass with palliative radiation for pain management and to maximize local tumor control. We discussed the PET scan findings which he had completed just prior to our visit today. Dr Fuller will discuss a final plan with him.      His initial o2 level on arrival was 77%, he states he ran out of oxygen since hes been here in town, we were able to call and get him new tanks delivered here during his appt. Saturation level came up to 94.     The patient verbalizes understanding of this discussion, voice no further questions and wish to proceed with recommendations.  We will simulate treatment  pleitez today to begin the treatment planning, I anticipate a dose of 3250 cGy over 13 fractions, final course to be determined.     Rodrigo Javed Jr.  reports that he has been smoking. He has a 82.50 pack-year smoking history. He has never used smokeless tobacco.. I have educated him on the risk of diseases from using tobacco products such as cancer, COPD and heart disease. I advised him to quit and he is not willing to quit. I spent >10 minutes counseling the patient.     Thank you for allowing me to assist in this patients care.      Todays appointment time was spent in counseling and coordination of care as follows: diagnosis, intent of treatment discussing radiation therapy specifics: logistics, possible and probable side effects and after effects, staging of cancer, standard of care in for this stage of this cancer and treatment options  Tita Quinonez PA-C  12/10/2020    Previous interventions:  1.   Anticipated palliative immunotherapy (pembrolizumab).  Patient declined therapy  2.   Palliative radiation to the right neck mass, 12/18/2020 through 01/22/2021 open (3250 cGy over 13 fractions).      LABS    Lab Results - Last 18 Months   Lab Units 12/01/20  1443 10/30/20  1536 12/09/19  1352 11/22/19  0425 11/20/19  0613 11/18/19  0545 11/17/19  0241 11/16/19  0704 11/15/19  1355   HEMOGLOBIN g/dL 14.9 15.2 10.4* 9.1* 9.8* 8.4* 7.5* 8.1* 8.5*   HEMATOCRIT % 48.8 48.7 36.4* 32.4* 36.6* 31.0* 27.6* 29.9* 32.4*   MCV fL 92.8 91.9 79.6 72.2* 73.3* 74.0* 72.1* 71.7* 74.7*   WBC 10*3/mm3 7.30 8.00 5.90 8.48 10.53 8.35 5.22 4.97 6.57   RDW % 16.2* 16.1* 26.3* 21.8* 20.6* 20.0* 20.3* 20.2* 20.5*   MPV fL 9.3 8.2 8.3 10.2 9.8 9.4 10.1 9.9 9.5   PLATELETS 10*3/mm3 261 286 304 407 399 333 305 376 372   IMM GRAN % % 0.3  --   --   --   --   --  1.0*  --   --    NEUTROS ABS 10*3/mm3 4.98 5.50 3.40  --   --  7.63* 4.53 3.94 4.78   LYMPHS ABS 10*3/mm3 1.27 1.80 1.80  --   --  0.33* 0.45* 0.86  --    MONOS ABS  10*3/mm3 0.75  --   --   --   --  0.25 0.19 0.12  --    EOS ABS 10*3/mm3 0.25  --   --   --   --  0.00 0.00 0.00 0.79*   BASOS ABS 10*3/mm3 0.03  --   --   --   --  0.01 0.00 0.01 0.13   IMMATURE GRANS (ABS) 10*3/mm3 0.02  --   --   --   --   --  0.05  --   --    NRBC /100 WBC 0.0  --   --   --   --   --  2.7*  --  4.0*   NEUTROPHIL % %  --   --   --   --   --   --   --   --  72.7   MONOCYTES % %  --   --   --   --   --   --   --   --  4.0*   BASOPHIL % %  --   --   --   --   --   --   --   --  2.0*   ATYP LYMPH % %  --   --   --   --   --   --   --   --  1.0   ANISOCYTOSIS   --   --   --   --   --   --   --   --  Slight/1+   GIANT PLT   --   --   --   --   --   --   --   --  Large/3+       Lab Results - Last 18 Months   Lab Units 12/01/20  1443 10/30/20  1536 12/09/19  1352 11/22/19  0425 11/21/19  0515 11/20/19  0613  11/16/19  0704 11/15/19  1355 10/30/19  1520   GLUCOSE mg/dL 111* 141* 102* 146* 110* 88   < > 171* 128* 103*   SODIUM mmol/L 144 137 144 141 142 140   < > 142 144 140   SODIUM, ARTERIAL   --   --   --   --   --   --    < >  --   --   --    POTASSIUM mmol/L 4.4 4.6 4.8 4.1 4.0 4.0   < > 4.3 3.8 4.7   CO2 mmol/L 28.0 32.0* 26.0 34.0* 37.0* 36.0*   < > 30.0* 33.0* 25.0   CHLORIDE mmol/L 107 102 110 99 98 100   < > 104 103 108   ANION GAP mmol/L 9.0 3.0* 8.0 8.0 7.0 4.0*   < > 8.0 8.0 7.0   CREATININE mg/dL 1.10 1.52* 1.06 1.27 1.15 0.92   < > 1.03 1.06 1.22   BUN mg/dL 11 20 12 30* 25* 21   < > 16 14 15   BUN / CREAT RATIO  10.0 13.2 11.3 23.6 21.7 22.8   < > 15.5 13.2 12.3   CALCIUM mg/dL 9.2 9.6 8.9 8.8 9.2 9.3   < > 8.5* 9.0 8.5   EGFR IF NONAFRICN AM mL/min/1.73 65 45* 68 55* 62 80   < > 70 68 58*   ALK PHOS U/L 139* 107 93  --   --   --   --  119* 145* 116   TOTAL PROTEIN g/dL 7.1 7.7 7.5  --   --   --   --  6.8 7.6 7.5   ALT (SGPT) U/L 10 13 24  --   --   --   --  13 17 17   AST (SGOT) U/L 16 23 24  --   --   --   --  23 27 22   BILIRUBIN mg/dL 0.5 0.8 0.6  --   --   --   --  0.6 0.6 0.6  "  ALBUMIN g/dL 3.80 4.10 3.90  --   --   --   --  3.10* 3.70 3.70   GLOBULIN gm/dL 3.3 3.6 3.6  --   --   --   --  3.7 3.9 3.8    < > = values in this interval not displayed.       Lab Results - Last 18 Months   Lab Units 12/01/20  1443   CEA ng/mL 5.48       Lab Results - Last 18 Months   Lab Units 12/01/20  1443 10/30/20  1536 11/17/19  0241 11/16/19  0704   IRON mcg/dL 49*  --  11*  --    TIBC mcg/dL 446  --  437  --    IRON SATURATION % 11*  --  3*  --    FERRITIN ng/mL 58.51  --  5.87*  --    TSH uIU/mL  --  1.250  --  0.407         PAST MEDICAL HISTORY:  ALLERGIES:  Allergies   Allergen Reactions   • Tetracycline Other (See Comments)     \"Break out in great big blisters\"     CURRENT MEDICATIONS:  Outpatient Encounter Medications as of 1/27/2021   Medication Sig Dispense Refill   • albuterol sulfate  (90 Base) MCG/ACT inhaler INHALE 2 PUFFS BY MOUTH EVERY 4 HOURS     • aluminum-magnesium hydroxide-simethicone (MAALOX MAX) 400-400-40 MG/5ML suspension Take 15 mL by mouth Every 6 (Six) Hours As Needed for Indigestion or Heartburn.     • bisacodyl (DULCOLAX) 5 MG EC tablet Take 2 tablets by mouth Daily As Needed for Constipation.     • Breo Ellipta 100-25 MCG/INH inhaler      • ferrous sulfate (FERROUSUL) 325 (65 FE) MG tablet Take 1 tablet by mouth Daily With Breakfast.     • furosemide (LASIX) 40 MG tablet Take 1 tablet by mouth Daily.     • guaiFENesin (MUCINEX) 600 MG 12 hr tablet Take 2 tablets by mouth Every 12 (Twelve) Hours.     • hydroCHLOROthiazide (HYDRODIURIL) 12.5 MG tablet Take 12.5 mg by mouth Daily.     • HYDROcodone-acetaminophen (NORCO) 5-325 MG per tablet Take 1 tablet by mouth Every 6 (Six) Hours As Needed for Moderate Pain . 60 tablet 0   • hydrocortisone (ANUSOL-HC) 25 MG suppository Insert 1 suppository into the rectum 2 (Two) Times a Day for 14 days. 28 suppository 2   • hydrOXYzine (ATARAX) 50 MG tablet Take 1 tablet by mouth At Night As Needed for Anxiety.     • " ipratropium-albuterol (DUO-NEB) 0.5-2.5 mg/3 ml nebulizer Take 3 mL by nebulization Every 6 (Six) Hours. 360 mL    • Lidocaine Viscous HCl (XYLOCAINE) 2 % solution Take 5 mL by mouth As Needed (for swallowing difficulty caused by radiation) for up to 60 days. 100 mL 4   • lisinopril (PRINIVIL,ZESTRIL) 10 MG tablet Take 1 tablet by mouth Daily.     • metoprolol succinate XL (TOPROL-XL) 25 MG 24 hr tablet Take 25 mg by mouth Daily.     • nicotine (NICODERM CQ) 21 MG/24HR patch Place 1 patch on the skin as directed by provider Every Night.     • ondansetron (ZOFRAN) 4 MG tablet Take 1 tablet by mouth Every 8 (Eight) Hours As Needed for Nausea or Vomiting. 30 tablet 0   • potassium chloride ER (K-TAB) 20 MEQ tablet controlled-release ER tablet      • [DISCONTINUED] hydrocortisone (ANUSOL-HC) 25 MG suppository Insert 1 suppository into the rectum 2 (Two) Times a Day.       No facility-administered encounter medications on file as of 1/27/2021.      Adult illnesses:  History of acute respiratory failure with hypoxia and hypercapnia  Hypertension  Microcytic anemia  COPD with acute exacerbation  History of acute diastolic heart failure  History of pulmonary nodule  Hyperglycemia  Alcoholism/alcohol abuse  Chronic constipation    Past surgeries:  Colonoscopy  Hemorrhoidectomy    ADULT ILLNESSES:  Patient Active Problem List   Diagnosis Code   • Acute respiratory failure with hypoxia and hypercapnia (CMS/HCC) J96.01, J96.02   • Essential hypertension I10   • Microcytic anemia D50.9   • COPD with acute exacerbation (CMS/HCC) J44.1   • Acute diastolic heart failure (CMS/HCC) I50.31   • Pulmonary nodule R91.1   • Hyperglycemia R73.9   • Alcoholism /alcohol abuse (CMS/HCC) F10.20   • Chronic constipation K59.09   • Obesity E66.9   • Tobacco dependence syndrome F17.200   • Squamous cell lung cancer (CMS/HCC) C34.90   • Current every day smoker F17.200   • 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   • Cancer related pain G89.3   •  "Dependence on continuous supplemental oxygen Z99.81     SURGERIES:  Past Surgical History:   Procedure Laterality Date   • COLONOSCOPY     • HEMORRHOIDECTOMY       HEALTH MAINTENANCE ITEMS:  Health Maintenance Due   Topic Date Due   • URINE MICROALBUMIN  1940   • TDAP/TD VACCINES (1 - Tdap) 11/09/1961   • ZOSTER VACCINE (1 of 2) 11/09/1992   • Pneumococcal Vaccine 65+ (1 of 1 - PPSV23) 11/09/2007   • HEPATITIS C SCREENING  11/04/2019   • ANNUAL WELLNESS VISIT  11/04/2019   • DIABETIC EYE EXAM  11/04/2019   • INFLUENZA VACCINE  08/01/2020       <no information>  Last Completed Colonoscopy     Patient has no health maintenance due at this time          There is no immunization history on file for this patient.  Last Completed Mammogram     Patient has no health maintenance due at this time            FAMILY HISTORY:  Family History   Problem Relation Age of Onset   • No Known Problems Mother    • Cancer Father    • Colon cancer Neg Hx    • Colon polyps Neg Hx    • Esophageal cancer Neg Hx      SOCIAL HISTORY:  Social History     Socioeconomic History   • Marital status: Single     Spouse name: Not on file   • Number of children: Not on file   • Years of education: Not on file   • Highest education level: Not on file   Tobacco Use   • Smoking status: Current Every Day Smoker     Packs/day: 1.50     Years: 55.00     Pack years: 82.50   • Smokeless tobacco: Never Used   Substance and Sexual Activity   • Alcohol use: Not Currently     Frequency: Never     Comment: weekly   • Drug use: No   • Sexual activity: Never     Okay REVIEW OF SYSTEMS:  Review of Systems   Constitutional: Positive for activity change (\"same in the last year. not more, not less.\"). Negative for appetite change (\"I eat well.\"), chills, diaphoresis, fatigue, fever, unexpected weight gain and unexpected weight loss.        Lives by himself.  Manages his personal ADLs but unable to do chores.  He runs short errands, driving short distances. Says he " "spends > 90% sitting and sleeping.     HENT: Positive for swollen glands (Right neck mass \"same size\"), trouble swallowing (\"worse with some foods. like a stick in my throat.\") and voice change (worsening hoarseness). Negative for postnasal drip, rhinorrhea and sore throat.    Eyes: Negative.    Respiratory: Positive for cough (occasional cream/brown phlegm), shortness of breath (Baseline exertional dyspnea and sob with some of his routine activities) and wheezing (\"sometimes\").         Smoker since age 22's - currently 1.5 ppd    Is on home O2   Cardiovascular: Negative.    Gastrointestinal: Positive for constipation (Uses stool softeners). Negative for abdominal pain, blood in stool, diarrhea, nausea, vomiting and GERD.   Endocrine: Negative.    Genitourinary: Negative.  Negative for nocturia.   Musculoskeletal: Positive for joint swelling (left ankle- chronic) and neck pain (related to the neck mass, \"once in awhile\".  Says pain meds have helped - usually before bedtime only). Negative for arthralgias.   Skin: Negative.    Allergic/Immunologic: Negative.    Neurological: Negative.  Negative for dizziness and headache.   Hematological: Positive for adenopathy (right neck mass, \"same size\").   Psychiatric/Behavioral: Positive for depressed mood. The patient is nervous/anxious.        /64   Pulse 83   Temp 97.4 °F (36.3 °C)   Resp 16   Ht 165.1 cm (65\")   Wt 77.5 kg (170 lb 14.4 oz)   SpO2 91%   BMI 28.44 kg/m²  Body surface area is 1.85 meters squared.  Pain Score    01/27/21 1132   PainSc:   8       Physical Exam:  Physical Exam  Vitals signs reviewed.   Constitutional:       Comments: Pleasant, cooperative, heavy-set, slimmer, modestly kept elderly male.  Ambulatory with a walker. ECOG 3.      Has lost 14 pounds   HENT:      Head: Normocephalic and atraumatic.      Mouth/Throat:      Comments: Wearing a surgical mask today    He is wearing O2 via cannula  Eyes:      General: No scleral icterus.     " Extraocular Movements: Extraocular movements intact.      Pupils: Pupils are equal, round, and reactive to light.   Cardiovascular:      Rate and Rhythm: Normal rate and regular rhythm.      Heart sounds: No murmur. No gallop.    Pulmonary:      Comments: Distant breath sounds  Abdominal:      General: There is no distension.      Palpations: Abdomen is soft. There is no mass.      Tenderness: There is no abdominal tenderness. There is no guarding.      Comments: Globose, soft   Musculoskeletal: Normal range of motion.         General: No swelling.      Right lower leg: No edema.      Left lower leg: Edema (trace) present.   Lymphadenopathy:      Cervical: Cervical adenopathy (Large right sided, firm, fixed, non-tender anterior mass is unchanged other than radiation associated hyperemia) present.   Skin:     Coloration: Skin is not jaundiced.      Findings: No bruising, erythema or rash.   Neurological:      General: No focal deficit present.      Mental Status: He is alert and oriented to person, place, and time.      Cranial Nerves: No cranial nerve deficit.   Psychiatric:         Mood and Affect: Mood normal.         Behavior: Behavior normal.         Thought Content: Thought content normal.         Assessment:  1.   Squamous cell carcinoma, metastatic.  Likely epiglottis/supraglottic larynx primary with probable synchronous lung primary with metastases           Original tumor stage (epiglottis): IVC (cT1,cN3, M1)           Original tumor stage (lung):  IV (cT2,cN0,M1)           PD-L1 positive CPS > 1           Tumor burden:  --10/30/2020-chest CT (comparison 11/16/2019) for follow-up of solitary pulmonary nodule revealed multiple new and enlarging pulmonary nodules (measuring up to 2.7 cm right upper lobe pulmonary nodule with chest wall and mediastinal abutment; new 1 cm left lower lobe superior segment pulmonary nodule, new 6 mm right inferior upper lobe pulmonary nodule, new 5 mm right lower lobe pulmonary  "nodule, new 7 mm left upper lobe pulmonary nodule) highly suspicious for pulmonary metastasis.  No enlarged axillary/supraclavicular/mediastinal/hilar lymphadenopathy.  Multiple indeterminate low-density liver lesions, potentially cysts.  --10/30/2020-CT soft tissue neck without contrast-large lobular centrally necrotic soft tissue mass measuring 50 x 48 x 44 mm along the right side of the neck.  Metastatic lymphadenopathy suspected.  --12/16/2020-flexible laryngoscopy (Dr. Norris).  Obvious lesion of epiglottis and supraglottic larynx.  No limitation of vocal cord mobility.  Likely primary tumor.            Baseline CEA: 5.48, 12/01/2020            Tumor status:   --Systemically untreated-declined palliative pembrolizumab  --Completed palliative radiation to the right neck (3250 centigrays in 13 fractions), 01/22/2021.            Prognosis: Poor    2.   COPD.  On home O2  3.   Tobacco abuse.  Still smoking 1.5 ppd  4.   Alcohol abuse.  Still drinks sultana, \"occasionally.\"  5.   History of diastolic CHF  6.   Borderline iron deficiency without anemia.  7.   Borderline performance status (PS 2-3)  8.   Solitary living condition.     Plan:  1.   Previously apprised of the labs and radiographic findings (including PET scan, 12/10/2020 above). Long conference regarding the pathologic findings, extent of disease (metastatic), prognosis (poor), palliative intent of therapy, benefits of therapy, and potential toxicities of same (see below).    Note that labs ordered at his last visit, 12/16/2020 were not drawn.  2.   CT scans from 10/30/2020 previously reviewed with Dr. Marie of radiology.  Agrees that the 7 mm right upper lobe lung nodule from the CT angiogram last 11/21/2019 corresponds with the 2.7 cm right upper lobe pulmonary nodule seen on most recent CT of the chest, 10/30/2020, indicating a possible primary site.  3.   Pathology from the core needle biopsy taken from the neck mass on 11/06/2020 previously " discussed with Dr. Steve of pathology.  Unfortunately, they are unable to tell me from that specimen where the primary site is.  4.   Previously discussed with Dr. Norris of ENT, who saw the patient today.  Flexible laryngoscopy revealed an obvious lesion of his epiglottis and supraglottic larynx which is felt to represent the primary tumor..  5.   Previously discussed that given that the metastatic process in the lungs most life-threatening and is felt to be a second primary, I have reviewed NCCN guidelines version 3.2020 non-small cell lung cancer- for PD-L1 positive (>50%), and EGFR, ALK, ROS 1, BRAF negative with PS 0-2 - squamous cell carcinoma -preferred: Pembrolizumab (category 1) or carboplatin+ (paclitaxel or albumin-bound paclitaxel) + pembrolizumab (category 1).   6.   Previously discussed the potential toxicities of Keytruda discussed (to include but not limited to: Fatigue, hyperglycemia, hyponatremia, hypoalbuminemia, cough, nausea, pruritus, rash, decreased appetite, hypertriglyceridemia, hypercholesterolemia, hepatotoxicity, hypocalcemia, constipation, diarrhea, arthralgias, dyspnea, peripheral edema, vomiting, headache, anemia, abdominal pain, back pain, fever, vitiligo, hyperthyroidism, hypothyroidism, facial edema, colitis, pneumonia, renal failure, lymphopenia, confusion, pleural effusion, severe infusion reactions, immune-mediated reactions, exfoliative dermatitis, pneumonitis, pneumonia, pulmonary embolism, sepsis, pancreatitis, type 1 diabetes with ketoacidosis, adrenal insufficiency, nephritis, uveitis, optic neuritis, myasthenia gravis, myositis, rhabdomyolysis, hemolytic anemia, seizures, peripheral neuropathy, respiratory failure). Questions answered to his apparent satisfaction and to the best of my ability. He had agreed to a trial of therapy, but only with immunotherapy.  He declines chemotherapy which in this instance (multiple comorbidities, advanced age, borderline-poor performance  status) is perfectly reasonable.  The patient has been very reluctant to undergo any type of therapy, thus we discussed the other option of conservative management/comfort care and hospice.  He has apparently discussed with his family and is ready for hospice.    7.    Refer to hospice.   8.   Review visit with ADAN Maier with GI on 12/14/2020.  Was seen to assess the mild FDG uptake at the rectum.  colonoscopy or [facility] discussed but patient declined.  9.   Return to office as needed.    I spent - 30 total minutes, face-to-face, caring for Rodrigo today.  Greater than 50% of this time involved counseling and/or coordination of care as documented within this note regarding the patient's illness(es), pros and cons of various treatment options, instructions and/or risk reduction.

## 2022-01-28 NOTE — TELEPHONE ENCOUNTER
Received call from patient daughter she calls to report patient is out of his pain medication and requesting refill. She reports that due to his increased pain (Lung Cancer) he has taken more of the medication than prescribed and is now out. Dr Mcqueen gave verbal ok to refill prescription now and increase quantity of tablets from 40 to 60, family request script to be sent to  Pharmacy due to patient having transportation issues.    Patient/Caregiver provided printed discharge information.
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
## 2023-04-24 NOTE — DISCHARGE PLACEMENT REQUEST
"  MIGUEL PRIYABRYANNA [phone]  Yuli Christian (79 y.o. Male)     Date of Birth Social Security Number Address Home Phone MRN    1940  [id number] [place] [id number] [phone] [id number]    Orthodoxy Marital Status          Amish Single       Admission Date Admission Type Admitting Provider Attending Provider Department, Room/Bed    11/15/19 Emergency Delvin Lopez DO Hancock, John C, DO [facility] 4C, 473/1    Discharge Date Discharge Disposition Discharge Destination                       Attending Provider:  Delvin Lopez DO    Allergies:  Tetracycline    Isolation:  None   Infection:  None   Code Status:  CPR    Ht:  162.6 cm (64.02\")   Wt:  96.5 kg (212 lb 11.2 oz)    Admission Cmt:  None   Principal Problem:  Acute respiratory failure with hypoxia and hypercapnia (CMS/HCC) [J96.01,J96.02]                 Active Insurance as of 11/15/2019     Primary Coverage     Payor Plan Insurance Group Employer/Plan Group    MEDICARE MEDICARE A & B      Payor Plan Address Payor Plan Phone Number Payor Plan Fax Number Effective Dates    [address] [phone]  11/1/2007 - None Entered    [place] [address]       Subscriber Name Subscriber Birth Date Member ID       YULI CHRISTIAN 1940 [id number]                 Emergency Contacts      (Rel.) Home Phone Work Phone Mobile Phone    Jose Alejandro Leonardo (Daughter) [phone] -- --    HARDY FRANCOIS (Friend) [phone] -- --    LUIS FRANCOIS (Friend) [phone] -- --               History & Physical      Charmaine Yao APRN at 11/15/19 1722     Attestation signed by Maximino Lamb DO at 11/15/19 0224    I personally evaluated and examined the patient in conjunction with ADAN Mcgraw and agree with the assessment, treatment plan, and disposition of the patient as recorded by her. My history, exam, and further recommendations are:     I discussed the patient's condition with Dr. Barr in the " Transfer Note "emergency department.  Patient is noted to have progressive worsening of his respiratory condition and progressive acidosis despite Vapotherm and then conversion to BiPAP.  We are unable to blow off CO2 and his pH continues to decline.  I asked Dr. Barr he could intubate the patient in the emergency department.  I understand there are no critical care beds available so the patient will stay in the emergency department.  Orders have been written for propofol sedation and pulmonary consultation for ventilator management.    Maximino FLANNERYKaron Lamb, DO  11/15/19  6:04 PM                        [facility] Services  HISTORY AND PHYSICAL    Date of Admission: 11/15/2019  Primary Care Physician: Delvin Caldera MD    Subjective     Chief Complaint: Shortness of breathing with declining condition    History of Present Illness  HPI obtained from medical record/emergency department note and discussion with Dr. Barr as patient currently is obtunded.  Apparently patient was seen at \"clinic\" 2 days ago after a fall, x-rays were completed and he was stated there was no fractures.  He complained of jock itch and they did provide medication.  He has subsequently developed scrotal and abdominal swelling that he feels is secondary to medication used to treat issue.  He returned to clinic today and was informed he needed to come to the emergency department to \"get fluid drained off\".  It is surmised they felt he was having a heart failure exacerbation however laboratory studies and x-rays do not indicate this.  Patient stated his legs are no larger from edema than normal.  Patient was placed on Vapotherm, repeat ABGs worse than initial, subsequently changed to BiPAP and again they continue to decline.  There is discussion regarding possible need to intubate however we will continue with conservative measures for now.  Patient will nod or shake head to questions however I am unable to " "determine answers from that behavior.  Lung sounds are extremely coarse throughout patient is noted to have anemia with hemoglobin of 8.5, elevated d-dimer 1.81 will determine if we will proceed with CTA when respiratory status more stable.  Currently patient is unable to answer any questions.  He is going to be housed in the emergency department for now until critical bed opens.  Condition is critical.    Review of Systems   Unable to determine due to patient's altered mental status    Past Medical History:   Past Medical History:   Diagnosis Date   • Cataract    • Hypertension        Past Surgical History:   Past Surgical History:   Procedure Laterality Date   • COLONOSCOPY     • HEMORRHOIDECTOMY         Family History: family history is not on file.  Unable to determine due to patient's altered mental status    Social History:  reports that he has been smoking.  He has a 82.50 pack-year smoking history. He has never used smokeless tobacco. He reports that he drinks about 8.4 oz of alcohol per week. He reports that he does not use drugs.    Code Status: Full      Allergies:  Allergies   Allergen Reactions   • Tetracycline Other (See Comments)       Medications:  Prior to Admission medications    Medication Sig Start Date End Date Taking? Authorizing Provider   Ascorbic Acid (VITAMIN C) 500 MG capsule     Amira Huizar MD   ipratropium-albuterol (COMBIVENT RESPIMAT)  MCG/ACT inhaler Inhale 1 puff 4 times a day by inhalation route. 10/30/19   Amira Huizar MD   vitamin A 04627 UNIT capsule Take 10,000 Units by mouth Daily.    Amira Huizar MD   Vitamin D, Cholecalciferol, (CHOLECALCIFEROL) 10 MCG (400 UNIT) tablet Take 400 Units by mouth Daily.    Amira Huizar MD       Objective     /78   Pulse 77   Temp 98.1 °F (36.7 °C) (Oral)   Resp 18   Ht 165.1 cm (65\")   Wt 88.9 kg (196 lb)   SpO2 96%   BMI 32.62 kg/m²    Physical Exam   Constitutional: He appears " well-developed and well-nourished.   HENT:   Head: Normocephalic and atraumatic.   Eyes: EOM are normal. Pupils are equal, round, and reactive to light.   Neck: Neck supple. No tracheal deviation present.   Cardiovascular: Normal rate, regular rhythm and normal heart sounds.   No murmur heard.  Pulmonary/Chest: He is in respiratory distress.   Coarseness throughout   Abdominal: Soft. Bowel sounds are normal. He exhibits distension.   Musculoskeletal: He exhibits edema (trace bilateral lower extremities).   Neurological:   Obtunded   Skin: Skin is warm and dry. There is pallor.   Psychiatric:   Flat       Pertinent Data:   Lab Results (last 72 hours)     Procedure Component Value Units Date/Time    Blood Gas, Arterial [[id number]]  (Abnormal) Collected:  11/15/19 1630    Specimen:  Arterial Blood Updated:  11/15/19 1640     Site Right Radial     Dov's Test Positive     pH, Arterial 7.309 pH units      Comment: 84 Value below reference range        pCO2, Arterial 66.1 mm Hg      Comment: 83 Value above reference range        pO2, Arterial 76.8 mm Hg      Comment: 84 Value below reference range        HCO3, Arterial 33.2 mmol/L      Comment: 83 Value above reference range        Base Excess, Arterial 5.9 mmol/L      Comment: 83 Value above reference range        O2 Saturation, Arterial 94.4 %      Temperature 37.0 C      Barometric Pressure for Blood Gas 759 mmHg      Modality BiPap     FIO2 30 %      Ventilator Mode NA     Set [facility] Resp Rate 16.0     IPAP 20     Comment: Meter: [id number]     :  [id number]        EPAP 6     Collected by [id number]    Blood Gas, Arterial [[id number]]  (Abnormal) Collected:  11/15/19 1514    Specimen:  Arterial Blood Updated:  11/15/19 1518     Site Left Radial     Dov's Test Positive     pH, Arterial 7.321 pH units      Comment: 84 Value below reference range        pCO2, Arterial 64.5 mm Hg      Comment: 83 Value above reference range        pO2, Arterial 74.0 mm Hg       Comment: 84 Value below reference range        HCO3, Arterial 33.3 mmol/L      Comment: 83 Value above reference range        Base Excess, Arterial 6.2 mmol/L      Comment: 83 Value above reference range        O2 Saturation, Arterial 93.9 %      Comment: 84 Value below reference range        Temperature 37.0 C      Barometric Pressure for Blood Gas 759 mmHg      Modality Heated HFNC     FIO2 35 %      Flow Rate 40.0 lpm      Ventilator Mode NA     Collected by [id number]     Comment: Meter: [id number]     :  [id number]       D-dimer, Quantitative [[id number]]  (Abnormal) Collected:  11/15/19 1355    Specimen:  Blood Updated:  11/15/19 1501     D-Dimer, Quantitative 1.81 mg/L (FEU)     Narrative:       Reference Range is 0-0.50 mg/L FEU. However, results <0.50 mg/L FEU tends to rule out DVT or PE. Results >0.50 mg/L FEU are not useful in predicting absence or presence of DVT or PE.    Manual Differential [[id number]]  (Abnormal) Collected:  11/15/19 1355    Specimen:  Blood Updated:  11/15/19 1443     Neutrophil % 72.7 %      Lymphocyte % 8.1 %      Monocyte % 4.0 %      Eosinophil % 12.1 %      Basophil % 2.0 %      Atypical Lymphocyte % 1.0 %      Neutrophils Absolute 4.78 10*3/mm3      Lymphocytes Absolute 0.53 10*3/mm3      Monocytes Absolute 0.26 10*3/mm3      Eosinophils Absolute 0.79 10*3/mm3      Basophils Absolute 0.13 10*3/mm3      nRBC 4.0 /100 WBC      Anisocytosis Slight/1+     Hypochromia Mod/2+     Microcytes Slight/1+     Poikilocytes Slight/1+     Polychromasia Slight/1+     WBC Morphology Normal     Giant Platelets Large/3+    CBC Auto Differential [[id number]]  (Abnormal) Collected:  11/15/19 1355    Specimen:  Blood Updated:  11/15/19 1443     WBC 6.57 10*3/mm3      RBC 4.34 10*6/mm3      Hemoglobin 8.5 g/dL      Hematocrit 32.4 %      MCV 74.7 fL      MCH 19.6 pg      MCHC 26.2 g/dL      RDW 20.5 %      RDW-SD 54.5 fl      MPV 9.5 fL      Platelets 372 10*3/mm3     Comprehensive Metabolic  Panel [[id number]]  (Abnormal) Collected:  11/15/19 1355    Specimen:  Blood Updated:  11/15/19 1439     Glucose 128 mg/dL      BUN 14 mg/dL      Creatinine 1.06 mg/dL      Sodium 144 mmol/L      Potassium 3.8 mmol/L      Chloride 103 mmol/L      CO2 33.0 mmol/L      Calcium 9.0 mg/dL      Total Protein 7.6 g/dL      Albumin 3.70 g/dL      ALT (SGPT) 17 U/L      AST (SGOT) 27 U/L      Alkaline Phosphatase 145 U/L      Total Bilirubin 0.6 mg/dL      eGFR Non African Amer 68 mL/min/1.73      Globulin 3.9 gm/dL      A/G Ratio 0.9 g/dL      BUN/Creatinine Ratio 13.2     Anion Gap 8.0 mmol/L     Narrative:       GFR Normal >60  Chronic Kidney Disease <60  Kidney Failure <15    BNP [[id number]]  (Normal) Collected:  11/15/19 1355    Specimen:  Blood Updated:  11/15/19 1435     proBNP 1,550.0 pg/mL     Troponin [[id number]]  (Normal) Collected:  11/15/19 1355    Specimen:  Blood Updated:  11/15/19 1435     Troponin T 0.012 ng/mL     Magnesium [[id number]]  (Normal) Collected:  11/15/19 1355    Specimen:  Blood Updated:  11/15/19 1435     Magnesium 2.0 mg/dL     Lactic Acid, Plasma [[id number]]  (Normal) Collected:  11/15/19 1355    Specimen:  Blood Updated:  11/15/19 1431     Lactate 1.1 mmol/L     Blood Culture - Blood, Arm, Left [[id number]] Collected:  11/15/19 1355    Specimen:  Blood from Arm, Left Updated:  11/15/19 1424    Blood Culture - Blood, Hand, Right [[id number]] Collected:  11/15/19 1359    Specimen:  Blood from Hand, Right Updated:  11/15/19 1424    Blood Gas, Arterial [[id number]]  (Abnormal) Collected:  11/15/19 1356    Specimen:  Arterial Blood Updated:  11/15/19 1401     Site Left Radial     Dov's Test Positive     pH, Arterial 7.355 pH units      pCO2, Arterial 57.5 mm Hg      Comment: 83 Value above reference range        pO2, Arterial 45.9 mm Hg      Comment: 85 Value below critical limit        HCO3, Arterial 32.1 mmol/L      Comment: 83 Value above reference range        Base Excess, Arterial 5.7 mmol/L       Comment: 83 Value above reference range        O2 Saturation, Arterial 80.0 %      Comment: 84 Value below reference range        Temperature 37.0 C      Barometric Pressure for Blood Gas 759 mmHg      Modality Room Air     Ventilator Mode NA     Notified Who DR YOU     Notified By [id number]     Notified Time 11/15/2019 14:01     Collected by [id number]     Comment: Meter: [id number]     :  [id number]           Imaging Results (Last 24 Hours)     Procedure Component Value Units Date/Time    XR Chest 1 View [[id number]] Collected:  11/15/19 1413     Updated:  11/15/19 1417    Narrative:       Frontal upright radiograph of the chest 11/15/2019 2:09 PM CST     COMPARISON: 10/30/2019.     HISTORY: Short of breath.     FINDINGS:   Hyperinflation flattening diaphragms noted consistent with COPD.. The  cardiac silhouettes mildly enlarged but unchanged..      The osseous structures and surrounding soft tissues demonstrate no acute  abnormality.       Impression:       1. COPD no acute cardiopulmonary process.        This report was finalized on 11/15/2019 14:14 by Dr. Kailash Crouch MD.            I have personally reviewed and interpreted the radiology studies and ECG obtained at time of admission.     Assessment / Plan     Assessment:   Active Hospital Problems    Diagnosis   • **Acute respiratory failure with hypoxia and hypercapnia (CMS/HCC)   • Hypertension   • Anemia       Plan:   1.  Admit as inpatient critical care-we will have to house overnight in the emergency department due to bed availability  2.  Per discussion Dr. Lamb, will proceed with intubation, consult pulmonary  3.  Duo nebs, RT to initiate breathing treatment protocol,  4.  N.p.o. for now  5.  Labs in a.m.  6.  DVT prophylaxis with Lovenox    I discussed the patient's findings and my recommendations with: Maximino Lamb DO  Time spent: 45 minutes      Charmaine Yao, APRN  11/15/19   5:22 PM    Electronically signed by Adonis  Maximino FLANNERY DO at 11/15/19 1805       Prior to Admission Medications     None        Operative/Procedure Notes (last 24 hours) (Notes from 11/19/19 1246 through 11/20/19 1246)     No notes of this type exist for this encounter.           Physician Progress Notes (last 24 hours) (Notes from 11/19/19 1246 through 11/20/19 1246)      Delvin Lopez DO at 11/20/19 1057              [facility] Medicine Services  INPATIENT PROGRESS NOTE    Patient Name: Rodrigo Javed  Date of Admission: 11/15/2019  Today's Date: 11/20/19  Length of Stay: 5  Primary Care Physician: Delvin Caldera MD    Subjective   Chief Complaint: Shortness of breath.   HPI   He continues to complain of shortness of breath.  His oxygen has been weaned to 2 L.  He feels that his chest congestion is breaking up.  He complains of swollen legs still.  He needs to have his diuretics increased.  He is also been hypertensive and needs to have his antihypertensives addressed.    He did have a bowel movement.    He would like something to help him sleep at night.    Review of Systems   All pertinent negatives and positives are as above. All other systems have been reviewed and are negative unless otherwise stated.     Objective    Temp:  [97.9 °F (36.6 °C)-98.5 °F (36.9 °C)] 98.1 °F (36.7 °C)  Heart Rate:  [75-94] 86  Resp:  [16-18] 16  BP: (129-175)/(67-90) 175/78  Physical Exam  Constitutional: He is oriented to person, place, and time. He appears well-developed and well-nourished. Up in the chair.  No distress.  No family present. Discussed with his nurse, Ayleen.    Head: Normocephalic and atraumatic.   Eyes: Conjunctivae and EOM are normal. Pupils are equal, round, and reactive to light.   Neck: Neck supple. No JVD present.   Cardiovascular: Normal rate, regular rhythm, normal heart sounds and intact distal pulses. Exam reveals no gallop and no friction rub.   No murmur heard.  Pulmonary/Chest: Effort normal. No respiratory  distress. He has wheezes. He has no rales. He exhibits no tenderness. 2L.   Abdominal: Soft. Bowel sounds are normal. He exhibits distension. There is no tenderness. There is no rebound and no guarding.   Musculoskeletal: Normal range of motion. He exhibits no edema, tenderness or deformity. 1+ LE edema and some scrotal edema.    Neurological: He is alert and oriented to person, place, and time. He displays normal reflexes. No cranial nerve deficit. He exhibits normal muscle tone.   Skin: Skin is warm and dry. No rash noted.   Psychiatric: He has a normal mood and affect. His behavior is normal. Judgment and thought content normal.     Intake/Output Summary (Last 24 hours) at 11/20/2019 1058  Last data filed at 11/20/2019 1017  Gross per 24 hour   Intake 820 ml   Output 4575 ml   Net -3755 ml     Results Review:  I have reviewed the labs, radiology results, and diagnostic studies.    Laboratory Data:   Results from last 7 days   Lab Units 11/20/19  0613 11/18/19  0545 11/17/19  0241   WBC 10*3/mm3 10.53 8.35 5.22   HEMOGLOBIN g/dL 9.8* 8.4* 7.5*   HEMATOCRIT % 36.6* 31.0* 27.6*   PLATELETS 10*3/mm3 399 333 305      Results from last 7 days   Lab Units 11/20/19  0613 11/17/19  0257 11/17/19  0241 11/16/19  0704 11/15/19  1355   SODIUM mmol/L 140  --  141 142 144   SODIUM, ARTERIAL mmol/L  --  143  --   --   --    POTASSIUM mmol/L 4.0  --  4.4 4.3 3.8   CHLORIDE mmol/L 100  --  105 104 103   CO2 mmol/L 36.0*  --  26.0 30.0* 33.0*   BUN mg/dL 21  --  19 16 14   CREATININE mg/dL 0.92  --  0.99 1.03 1.06   CALCIUM mg/dL 9.3  --  8.4* 8.5* 9.0   BILIRUBIN mg/dL  --   --   --  0.6 0.6   ALK PHOS U/L  --   --   --  119* 145*   ALT (SGPT) U/L  --   --   --  13 17   AST (SGOT) U/L  --   --   --  23 27   GLUCOSE mg/dL 88  --  199* 171* 128*     Culture Data:   Blood Culture   Date Value Ref Range Status   11/15/2019 No growth at 4 days  Preliminary   11/15/2019 No growth at 4 days  Preliminary     Respiratory Culture   Date  Value Ref Range Status   11/16/2019 Light growth (2+) Acinetobacter baumannii (A)  Final   11/16/2019 Rare Normal Respiratory Char  Final     I have reviewed the patient's current medications.     Assessment/Plan     Active Hospital Problems    Diagnosis   • **Acute respiratory failure with hypoxia and hypercapnia (CMS/HCC)   • COPD with acute exacerbation (CMS/HCC)   • Pulmonary nodule   • Acute diastolic heart failure (CMS/HCC)   • Microcytic anemia   • Hyperglycemia   • Alcoholism /alcohol abuse (CMS/HCC)   • Chronic constipation   • Essential hypertension     Plan:  The patient was originally admitted on 11/15 by Charmaine Yao and Dr. Lamb.  He presented to the emergency department with shortness of breath.  He was felt to be having an exacerbation of chronic obstructive pulmonary disease secondary to long-standing and ongoing tobacco abuse.  His carbon dioxide retention and pH continued to worsen.  Trials of BiPAP were unsuccessful at controlling this.  He ultimately required intubation mechanical ventilation.  He was extubated on 11/17.     He is being followed by the pulmonary service.     Continue Rocephin.  He has grown pan susceptible Acinetobacter on sputum culture.  He does not have a clear pneumonia.  This is likely colonization.  Mucinex and incentive spirometry. Continue inhaled bronchodilators. Started Symbicort on 11/18. Weaned Solu-Medrol to oral prednisone on 11/19.     Counseled for tobacco cessation.  Nicotine patch offered.     He will need a repeat noncontrasted CT scan of the chest in 6 months to reevaluate his left upper lobe pulmonary nodule.    Dr. Ramey is repeating a PA lateral chest x-ray today.     He has hypertension.  He was not currently on an agent for this. Start lisinopril  on 11/19 and will uptitrate.   Add metoprolol tartrate.  He has some diastolic dysfunction on echocardiogram.  His BNP was normal at presentation.  He has had some complaints of scrotal edema and now has  worsening lower extremity edema. Lasix IV BID.       He is hyperglycemic secondary to steroids. Hemoglobin A1c 6.0.      The patient appears to have a chronic microcytic anemia.  He has been transfused 1 unit packed red blood cells on 11/17.  Anemia substrates were not assessed prior to the administration of blood products, but I was able to add them to old blood.  He is significantly iron deficient.  IV Venofer.  Dr. Caldera had him set up to have an outpatient colonoscopy on 11/18, however, he was obviously admitted.  This will need to be rescheduled as an outpatient for further evaluation of his chronic microcytic, iron deficient anemia.     B12 normal.     Bowel regimen.      Lovenox for DVT prophylaxis.     Pepcid for peptic ulcer prophylaxis.     Increase activity.     Discharge Planning: I expect the patient to be discharged to home in 1-3 days.    Delvin Lopez DO   11/20/19   10:57 AM      Electronically signed by Delvin Lopez DO at 11/20/19 1107     Felicia Hinds APRN at 11/20/19 0821              PULMONARY AND CRITICAL CARE PROGRESS NOTE - [facility]    Patient: Rodrigo Javed    1940    MR# [id number]    Acct# [id number]  11/20/19   12:02 PM  Referring Provider: Delvin Lopez DO    Chief Complaint: Mechanically ventilated    Interval history: He is sitting up in the chair this morning eating breakfast.  He appears to be breathing comfortably on nasal cannula at 2 L.  He reports waking up around 2 AM with a congested cough.  He is coughing up some mucus although he is having difficulty producing it.  He is afebrile.  No other new issues to report.  No family at the bedside.         Meds:    budesonide-formoterol 2 puff Inhalation BID - RT   cefTRIAXone 1 g Intravenous Daily   enoxaparin 40 mg Subcutaneous Q24H   furosemide 40 mg Intravenous Q12H   guaiFENesin 1,200 mg Oral Q12H   ipratropium-albuterol 3 mL Nebulization Q6H - RT   iron sucrose (VENOFER) IVPB 100 mg Intravenous  Q24H   [START ON 11/21/2019] lisinopril 10 mg Oral Q24H   metoprolol tartrate 12.5 mg Oral Q12H   nicotine 1 patch Transdermal Nightly   polyethylene glycol 17 g Oral Daily   predniSONE 20 mg Oral Daily   sodium chloride 10 mL Intravenous Q12H        Review of Systems:     Review of Systems - General ROS: negative for - chills or fever  Respiratory ROS: positive for - shortness of breath and wheezing  Cardiovascular ROS: negative for - chest pain or edema  Gastrointestinal ROS: no abdominal pain, change in bowel habits, or black or bloody stools  Musculoskeletal ROS: negative for - gait disturbance or joint pain  Dermatological ROS: negative for pruritus and rash  Neurological: ROS: positive for weakness    Ventilator Settings:     Vt (Set, L): 0.6 L  Resp Rate (Set): 10  Pressure Support (cm H2O): 5 cm H20  FiO2 (%): 35 %  PEEP/CPAP (cm H2O): 5 cm H20  Minute Ventilation (L/min) (Obs): 11.5 L/min  Resp Rate (Observed) Vent: 16  I:E Ratio (Set): 1:1.40  I:E Ratio (Obs): 1:1.8  PIP Observed (cm H2O): 11 cm H2O  RSBI: 42  Physical Exam:  Temp:  [97.9 °F (36.6 °C)-98.5 °F (36.9 °C)] 98.5 °F (36.9 °C)  Heart Rate:  [75-94] 77  Resp:  [16-18] 16  BP: (118-175)/(70-90) 118/81    Intake/Output Summary (Last 24 hours) at 11/20/2019 1202  Last data filed at 11/20/2019 1116  Gross per 24 hour   Intake 820 ml   Output 4075 ml   Net -3255 ml     SpO2 Percentage    11/20/19 0645 11/20/19 0740 11/20/19 1116   SpO2: 98% 93% 95%      Physical Exam   Constitutional: He appears well-developed and well-nourished. He does not have a sickly appearance. He does not appear ill. No distress. Nasal cannula in place.   Eyes: No scleral icterus.   Cardiovascular: Normal rate and regular rhythm.   No murmur heard.  Pulmonary/Chest: Effort normal. No accessory muscle usage. No respiratory distress. He has decreased breath sounds. He has wheezes. He has no rhonchi.   Musculoskeletal: He exhibits edema.   Skin: He is not diaphoretic.    Psychiatric: His mood appears not anxious. He is not agitated.     Results from last 7 days   Lab Units 11/20/19  0613 11/18/19  0545 11/17/19  0241   WBC 10*3/mm3 10.53 8.35 5.22   HEMOGLOBIN g/dL 9.8* 8.4* 7.5*   PLATELETS 10*3/mm3 399 333 305     Results from last 7 days   Lab Units 11/20/19  0613 11/17/19  0257 11/17/19  0241 11/16/19  0704   SODIUM mmol/L 140  --  141 142   SODIUM, ARTERIAL mmol/L  --  143  --   --    POTASSIUM mmol/L 4.0  --  4.4 4.3   BUN mg/dL 21  --  19 16   CREATININE mg/dL 0.92  --  0.99 1.03     Results from last 7 days   Lab Units 11/17/19  0257 11/16/19  0349 11/15/19  2043   PH, ARTERIAL pH units 7.381 7.489* 7.291*   PCO2, ARTERIAL mm Hg 49.0* 41.1 66.8*   PO2 ART mm Hg 75.4* 56.8* 121.0*   FIO2 % 40 40 60     Blood Culture   Date Value Ref Range Status   11/15/2019 No growth at 24 hours  Preliminary   11/15/2019 No growth at 24 hours  Preliminary   Recent films:  No radiology results for the last day  Films reviewed personally by me.  My interpretation: Pending       Pulmonary Assessment:    1. Severe acute respiratory failure with hypercapnia, improved, hypoxia compensated  2. Personal history of tobacco abuse  3. Possible substantial COPD  4. Probable chronic respiratory acidosis    Recommend:     · Chest x-ray today  · Hydroxyzine at bedtime   · O2 walks with PT    · Encourage IS   · And flutter  · Continue Mucinex  · Continue bronchodilators and antibiotics, empiric  · Lovenox for DVT prophylaxis   · Pepcid for GI prophylaxis     Electronically signed by ADAN Unger on 11/20/2019 at 12:02 PM          Electronically signed by Felicia Hinds APRN at 11/20/19 1204       Consult Notes (last 24 hours) (Notes from 11/19/19 1246 through 11/20/19 1246)     No notes of this type exist for this encounter.        Nutrition Notes (last 24 hours) (Notes from 11/19/19 1246 through 11/20/19 1246)     No notes of this type exist for this encounter.           Physical Therapy  Notes (last 24 hours) (Notes from 11/19/19 1246 through 11/20/19 1246)      Radha Lyons PTA at 11/20/19 1015  Version 1 of 1         Problem: Patient Care Overview  Goal: Plan of Care Review  Outcome: Ongoing (interventions implemented as appropriate)   11/20/19 1013   Coping/Psychosocial   Plan of Care Reviewed With patient   Plan of Care Review   Progress improving   OTHER   Outcome Summary Pt sitting up in chair. 3L o2 98%. sit-stand sba Pt amb 100'x2 rwx sba had unsteady gait at times but self correction of any LOB. discussed w/ pt home safety and POC. Pt able to go up/down 5 steps sba cues for safety. Monitor o2 amb on RA o2 dropped 87%. Pt would benfefit from  upon d/c may need o2 for home as well           Electronically signed by Radha Lyons PTA at 11/20/19 1015       Occupational Therapy Notes (last 24 hours) (Notes from 11/19/19 1246 through 11/20/19 1246)     No notes of this type exist for this encounter.        Speech Language Pathology Notes (last 24 hours) (Notes from 11/19/19 1246 through 11/20/19 1246)     No notes of this type exist for this encounter.        Respiratory Therapy Notes (last 24 hours) (Notes from 11/19/19 1246 through 11/20/19 1246)     No notes of this type exist for this encounter.